# Patient Record
Sex: FEMALE | Race: WHITE | NOT HISPANIC OR LATINO | Employment: OTHER | URBAN - METROPOLITAN AREA
[De-identification: names, ages, dates, MRNs, and addresses within clinical notes are randomized per-mention and may not be internally consistent; named-entity substitution may affect disease eponyms.]

---

## 2017-04-14 ENCOUNTER — CONVERSION ENCOUNTER (OUTPATIENT)
Dept: MAMMOGRAPHY | Facility: HOSPITAL | Age: 77
End: 2017-04-14

## 2017-04-27 ENCOUNTER — CONVERSION ENCOUNTER (OUTPATIENT)
Dept: MAMMOGRAPHY | Facility: HOSPITAL | Age: 77
End: 2017-04-27

## 2018-07-18 ENCOUNTER — CONVERSION ENCOUNTER (OUTPATIENT)
Dept: MAMMOGRAPHY | Facility: HOSPITAL | Age: 78
End: 2018-07-18

## 2019-01-23 ENCOUNTER — HOSPITAL ENCOUNTER (OUTPATIENT)
Dept: LAB | Facility: HOSPITAL | Age: 79
Discharge: HOME | End: 2019-01-23
Attending: INTERNAL MEDICINE

## 2019-01-23 LAB
25(OH)D3 SERPL-MCNC: 42.4 NG/ML (ref 30–100)
ALBUMIN SERPL-MCNC: 4.3 G/DL (ref 3.5–5)
ALBUMIN/GLOB SERPL: 1.5 {RATIO} (ref 1.4–2.6)
ALP SERPL-CCNC: 58 U/L (ref 43–160)
ALT SERPL-CCNC: 17 U/L (ref 10–40)
ANION GAP SERPL CALC-SCNC: 20 MMOL/L (ref 8–19)
APPEARANCE UR: CLEAR
AST SERPL-CCNC: 22 U/L (ref 15–50)
BILIRUB SERPL-MCNC: 0.42 MG/DL (ref 0.2–1.3)
BILIRUB UR QL: NEGATIVE
BUN SERPL-MCNC: 14 MG/DL (ref 5–25)
BUN/CREAT SERPL: 15 {RATIO} (ref 6–20)
CALCIUM SERPL-MCNC: 9.8 MG/DL (ref 8.7–10.4)
CHLORIDE SERPL-SCNC: 104 MMOL/L (ref 99–111)
CHOLEST SERPL-MCNC: 164 MG/DL (ref 107–200)
CHOLEST/HDLC SERPL: 4.3 {RATIO} (ref 3–6)
COLOR UR: YELLOW
CONV CO2: 23 MMOL/L (ref 22–32)
CONV COLLECTION SOURCE (UA): NORMAL
CONV TOTAL PROTEIN: 7.1 G/DL (ref 6.3–8.2)
CONV UROBILINOGEN IN URINE BY AUTOMATED TEST STRIP: 0.2 {EHRLICHU}/DL (ref 0.1–1)
CREAT UR-MCNC: 0.91 MG/DL (ref 0.5–0.9)
EST. AVERAGE GLUCOSE BLD GHB EST-MCNC: 126 MG/DL
GFR SERPLBLD BASED ON 1.73 SQ M-ARVRAT: >60 ML/MIN/{1.73_M2}
GLOBULIN UR ELPH-MCNC: 2.8 G/DL (ref 2–3.5)
GLUCOSE SERPL-MCNC: 112 MG/DL (ref 65–99)
GLUCOSE UR QL: NEGATIVE MG/DL
HBA1C MFR BLD: 6 % (ref 3.5–5.7)
HDLC SERPL-MCNC: 38 MG/DL (ref 40–60)
HGB UR QL STRIP: NEGATIVE
KETONES UR QL STRIP: NEGATIVE MG/DL
LDLC SERPL CALC-MCNC: 94 MG/DL (ref 70–100)
LEUKOCYTE ESTERASE UR QL STRIP: NEGATIVE
NITRITE UR QL STRIP: NEGATIVE
OSMOLALITY SERPL CALC.SUM OF ELEC: 297 MOSM/KG (ref 273–304)
PH UR STRIP.AUTO: 7.5 [PH] (ref 5–8)
POTASSIUM SERPL-SCNC: 4.1 MMOL/L (ref 3.5–5.3)
PROT UR QL: NEGATIVE MG/DL
SODIUM SERPL-SCNC: 143 MMOL/L (ref 135–147)
SP GR UR: 1.01 (ref 1–1.03)
TRIGL SERPL-MCNC: 160 MG/DL (ref 40–150)
VLDLC SERPL-MCNC: 32 MG/DL (ref 5–37)

## 2019-01-25 LAB — BACTERIA UR CULT: NORMAL

## 2019-03-14 ENCOUNTER — OFFICE VISIT CONVERTED (OUTPATIENT)
Dept: ORTHOPEDIC SURGERY | Facility: CLINIC | Age: 79
End: 2019-03-14
Attending: ORTHOPAEDIC SURGERY

## 2019-04-01 ENCOUNTER — HOSPITAL ENCOUNTER (OUTPATIENT)
Dept: PREADMISSION TESTING | Facility: HOSPITAL | Age: 79
Discharge: HOME | End: 2019-04-01
Attending: ORTHOPAEDIC SURGERY

## 2019-04-01 LAB
ANION GAP SERPL CALC-SCNC: 14 MMOL/L (ref 8–19)
APTT BLD: 22.5 S (ref 22.2–34.2)
BASOPHILS # BLD AUTO: 0.04 10*3/UL (ref 0–0.2)
BASOPHILS NFR BLD AUTO: 0.6 % (ref 0–3)
BUN SERPL-MCNC: 21 MG/DL (ref 5–25)
BUN/CREAT SERPL: 30 {RATIO} (ref 6–20)
CALCIUM SERPL-MCNC: 10.1 MG/DL (ref 8.7–10.4)
CHLORIDE SERPL-SCNC: 106 MMOL/L (ref 99–111)
CONV ABS IMM GRAN: 0.01 10*3/UL (ref 0–0.2)
CONV CO2: 26 MMOL/L (ref 22–32)
CONV IMMATURE GRAN: 0.2 % (ref 0–1.8)
CREAT UR-MCNC: 0.71 MG/DL (ref 0.5–0.9)
DEPRECATED RDW RBC AUTO: 45.1 FL (ref 36.4–46.3)
EOSINOPHIL # BLD AUTO: 0.11 10*3/UL (ref 0–0.7)
EOSINOPHIL # BLD AUTO: 1.7 % (ref 0–7)
ERYTHROCYTE [DISTWIDTH] IN BLOOD BY AUTOMATED COUNT: 13 % (ref 11.7–14.4)
GFR SERPLBLD BASED ON 1.73 SQ M-ARVRAT: >60 ML/MIN/{1.73_M2}
GLUCOSE SERPL-MCNC: 119 MG/DL (ref 65–99)
HBA1C MFR BLD: 13.2 G/DL (ref 12–16)
HCT VFR BLD AUTO: 40 % (ref 37–47)
INR PPP: 0.98 (ref 2–3)
LYMPHOCYTES # BLD AUTO: 2.52 10*3/UL (ref 1–5)
MCH RBC QN AUTO: 31.3 PG (ref 27–31)
MCHC RBC AUTO-ENTMCNC: 33 G/DL (ref 33–37)
MCV RBC AUTO: 94.8 FL (ref 81–99)
MONOCYTES # BLD AUTO: 0.65 10*3/UL (ref 0.2–1.2)
MONOCYTES NFR BLD AUTO: 9.8 % (ref 3–10)
NEUTROPHILS # BLD AUTO: 3.28 10*3/UL (ref 2–8)
NEUTROPHILS NFR BLD AUTO: 49.6 % (ref 30–85)
NRBC CBCN: 0 % (ref 0–0.7)
OSMOLALITY SERPL CALC.SUM OF ELEC: 298 MOSM/KG (ref 273–304)
PLATELET # BLD AUTO: 217 10*3/UL (ref 130–400)
PMV BLD AUTO: 9.2 FL (ref 9.4–12.3)
POTASSIUM SERPL-SCNC: 4.4 MMOL/L (ref 3.5–5.3)
PROTHROMBIN TIME: 10.2 S (ref 9.4–12)
RBC # BLD AUTO: 4.22 10*6/UL (ref 4.2–5.4)
SODIUM SERPL-SCNC: 142 MMOL/L (ref 135–147)
VARIANT LYMPHS NFR BLD MANUAL: 38.1 % (ref 20–45)
WBC # BLD AUTO: 6.61 10*3/UL (ref 4.8–10.8)

## 2019-04-10 ENCOUNTER — HOSPITAL ENCOUNTER (OUTPATIENT)
Dept: PERIOP | Facility: HOSPITAL | Age: 79
Setting detail: HOSPITAL OUTPATIENT SURGERY
Discharge: HOME | End: 2019-04-11
Attending: INTERNAL MEDICINE

## 2019-04-11 LAB
ANION GAP SERPL CALC-SCNC: 14 MMOL/L (ref 8–19)
BUN SERPL-MCNC: 17 MG/DL (ref 5–25)
BUN/CREAT SERPL: 25 {RATIO} (ref 6–20)
CALCIUM SERPL-MCNC: 8.7 MG/DL (ref 8.7–10.4)
CHLORIDE SERPL-SCNC: 103 MMOL/L (ref 99–111)
CONV CO2: 27 MMOL/L (ref 22–32)
CREAT UR-MCNC: 0.68 MG/DL (ref 0.5–0.9)
GFR SERPLBLD BASED ON 1.73 SQ M-ARVRAT: >60 ML/MIN/{1.73_M2}
GLUCOSE SERPL-MCNC: 145 MG/DL (ref 65–99)
HBA1C MFR BLD: 10.7 G/DL (ref 12–16)
HCT VFR BLD AUTO: 32.4 % (ref 37–47)
OSMOLALITY SERPL CALC.SUM OF ELEC: 292 MOSM/KG (ref 273–304)
POTASSIUM SERPL-SCNC: 4.8 MMOL/L (ref 3.5–5.3)
SODIUM SERPL-SCNC: 139 MMOL/L (ref 135–147)

## 2019-04-25 ENCOUNTER — OFFICE VISIT CONVERTED (OUTPATIENT)
Dept: ORTHOPEDIC SURGERY | Facility: CLINIC | Age: 79
End: 2019-04-25
Attending: ORTHOPAEDIC SURGERY

## 2019-05-23 ENCOUNTER — OFFICE VISIT CONVERTED (OUTPATIENT)
Dept: ORTHOPEDIC SURGERY | Facility: CLINIC | Age: 79
End: 2019-05-23
Attending: ORTHOPAEDIC SURGERY

## 2019-07-17 ENCOUNTER — HOSPITAL ENCOUNTER (OUTPATIENT)
Dept: LAB | Facility: HOSPITAL | Age: 79
Discharge: HOME | End: 2019-07-17
Attending: INTERNAL MEDICINE

## 2019-07-17 LAB
25(OH)D3 SERPL-MCNC: 44 NG/ML (ref 30–100)
ALBUMIN SERPL-MCNC: 4 G/DL (ref 3.5–5)
ALBUMIN/GLOB SERPL: 1.6 {RATIO} (ref 1.4–2.6)
ALP SERPL-CCNC: 68 U/L (ref 43–160)
ALT SERPL-CCNC: 13 U/L (ref 10–40)
ANION GAP SERPL CALC-SCNC: 15 MMOL/L (ref 8–19)
AST SERPL-CCNC: 19 U/L (ref 15–50)
BASOPHILS # BLD AUTO: 0.02 10*3/UL (ref 0–0.2)
BASOPHILS NFR BLD AUTO: 0.3 % (ref 0–3)
BILIRUB SERPL-MCNC: 0.44 MG/DL (ref 0.2–1.3)
BUN SERPL-MCNC: 18 MG/DL (ref 5–25)
BUN/CREAT SERPL: 23 {RATIO} (ref 6–20)
CALCIUM SERPL-MCNC: 9.4 MG/DL (ref 8.7–10.4)
CHLORIDE SERPL-SCNC: 104 MMOL/L (ref 99–111)
CONV ABS IMM GRAN: 0.02 10*3/UL (ref 0–0.2)
CONV CO2: 28 MMOL/L (ref 22–32)
CONV IMMATURE GRAN: 0.3 % (ref 0–1.8)
CONV TOTAL PROTEIN: 6.5 G/DL (ref 6.3–8.2)
CREAT UR-MCNC: 0.77 MG/DL (ref 0.5–0.9)
DEPRECATED RDW RBC AUTO: 44.1 FL (ref 36.4–46.3)
EOSINOPHIL # BLD AUTO: 0.22 10*3/UL (ref 0–0.7)
EOSINOPHIL # BLD AUTO: 3.5 % (ref 0–7)
ERYTHROCYTE [DISTWIDTH] IN BLOOD BY AUTOMATED COUNT: 12.9 % (ref 11.7–14.4)
EST. AVERAGE GLUCOSE BLD GHB EST-MCNC: 128 MG/DL
GFR SERPLBLD BASED ON 1.73 SQ M-ARVRAT: >60 ML/MIN/{1.73_M2}
GLOBULIN UR ELPH-MCNC: 2.5 G/DL (ref 2–3.5)
GLUCOSE SERPL-MCNC: 102 MG/DL (ref 65–99)
HBA1C MFR BLD: 12.4 G/DL (ref 12–16)
HBA1C MFR BLD: 6.1 % (ref 3.5–5.7)
HCT VFR BLD AUTO: 37.9 % (ref 37–47)
LYMPHOCYTES # BLD AUTO: 2.08 10*3/UL (ref 1–5)
MCH RBC QN AUTO: 30.4 PG (ref 27–31)
MCHC RBC AUTO-ENTMCNC: 32.7 G/DL (ref 33–37)
MCV RBC AUTO: 92.9 FL (ref 81–99)
MONOCYTES # BLD AUTO: 0.58 10*3/UL (ref 0.2–1.2)
MONOCYTES NFR BLD AUTO: 9.2 % (ref 3–10)
NEUTROPHILS # BLD AUTO: 3.41 10*3/UL (ref 2–8)
NEUTROPHILS NFR BLD AUTO: 53.8 % (ref 30–85)
NRBC CBCN: 0 % (ref 0–0.7)
OSMOLALITY SERPL CALC.SUM OF ELEC: 298 MOSM/KG (ref 273–304)
PLATELET # BLD AUTO: 219 10*3/UL (ref 130–400)
PMV BLD AUTO: 10.1 FL (ref 9.4–12.3)
POTASSIUM SERPL-SCNC: 4.3 MMOL/L (ref 3.5–5.3)
RBC # BLD AUTO: 4.08 10*6/UL (ref 4.2–5.4)
SODIUM SERPL-SCNC: 143 MMOL/L (ref 135–147)
VARIANT LYMPHS NFR BLD MANUAL: 32.9 % (ref 20–45)
WBC # BLD AUTO: 6.33 10*3/UL (ref 4.8–10.8)

## 2019-09-24 ENCOUNTER — HOSPITAL ENCOUNTER (OUTPATIENT)
Dept: MAMMOGRAPHY | Facility: HOSPITAL | Age: 79
Discharge: HOME | End: 2019-09-24
Attending: INTERNAL MEDICINE

## 2019-10-15 ENCOUNTER — HOSPITAL ENCOUNTER (OUTPATIENT)
Dept: INFUSION THERAPY | Facility: HOSPITAL | Age: 79
Discharge: HOME | End: 2019-10-15
Attending: INTERNAL MEDICINE

## 2019-10-15 LAB
ANION GAP SERPL CALC-SCNC: 15 MMOL/L (ref 8–19)
BUN SERPL-MCNC: 16 MG/DL (ref 5–25)
BUN/CREAT SERPL: 21 {RATIO} (ref 6–20)
CALCIUM SERPL-MCNC: 9.6 MG/DL (ref 8.7–10.4)
CHLORIDE SERPL-SCNC: 102 MMOL/L (ref 99–111)
CONV CO2: 27 MMOL/L (ref 22–32)
CREAT UR-MCNC: 0.75 MG/DL (ref 0.5–0.9)
GFR SERPLBLD BASED ON 1.73 SQ M-ARVRAT: >60 ML/MIN/{1.73_M2}
GLUCOSE SERPL-MCNC: 99 MG/DL (ref 65–99)
OSMOLALITY SERPL CALC.SUM OF ELEC: 289 MOSM/KG (ref 273–304)
POTASSIUM SERPL-SCNC: 4.6 MMOL/L (ref 3.5–5.3)
SODIUM SERPL-SCNC: 139 MMOL/L (ref 135–147)

## 2019-11-21 ENCOUNTER — OFFICE VISIT CONVERTED (OUTPATIENT)
Dept: ORTHOPEDIC SURGERY | Facility: CLINIC | Age: 79
End: 2019-11-21
Attending: ORTHOPAEDIC SURGERY

## 2019-12-17 ENCOUNTER — HOSPITAL ENCOUNTER (OUTPATIENT)
Dept: PREADMISSION TESTING | Facility: HOSPITAL | Age: 79
Discharge: HOME | End: 2019-12-17
Attending: ORTHOPAEDIC SURGERY

## 2019-12-17 LAB
ALBUMIN SERPL-MCNC: 4.1 G/DL (ref 3.5–5)
ALBUMIN/GLOB SERPL: 1.5 {RATIO} (ref 1.4–2.6)
ALP SERPL-CCNC: 70 U/L (ref 43–160)
ALT SERPL-CCNC: 16 U/L (ref 10–40)
ANION GAP SERPL CALC-SCNC: 14 MMOL/L (ref 8–19)
APTT BLD: 21.6 S (ref 22.2–34.2)
AST SERPL-CCNC: 19 U/L (ref 15–50)
BASOPHILS # BLD AUTO: 0.04 10*3/UL (ref 0–0.2)
BASOPHILS NFR BLD AUTO: 0.5 % (ref 0–3)
BILIRUB SERPL-MCNC: 0.49 MG/DL (ref 0.2–1.3)
BUN SERPL-MCNC: 11 MG/DL (ref 5–25)
BUN/CREAT SERPL: 14 {RATIO} (ref 6–20)
CALCIUM SERPL-MCNC: 9.6 MG/DL (ref 8.7–10.4)
CHLORIDE SERPL-SCNC: 101 MMOL/L (ref 99–111)
CONV ABS IMM GRAN: 0.02 10*3/UL (ref 0–0.2)
CONV CO2: 27 MMOL/L (ref 22–32)
CONV IMMATURE GRAN: 0.2 % (ref 0–1.8)
CONV TOTAL PROTEIN: 6.8 G/DL (ref 6.3–8.2)
CREAT UR-MCNC: 0.76 MG/DL (ref 0.5–0.9)
DEPRECATED RDW RBC AUTO: 45.1 FL (ref 36.4–46.3)
EOSINOPHIL # BLD AUTO: 0.15 10*3/UL (ref 0–0.7)
EOSINOPHIL # BLD AUTO: 1.8 % (ref 0–7)
ERYTHROCYTE [DISTWIDTH] IN BLOOD BY AUTOMATED COUNT: 13 % (ref 11.7–14.4)
EST. AVERAGE GLUCOSE BLD GHB EST-MCNC: 131 MG/DL
GFR SERPLBLD BASED ON 1.73 SQ M-ARVRAT: >60 ML/MIN/{1.73_M2}
GLOBULIN UR ELPH-MCNC: 2.7 G/DL (ref 2–3.5)
GLUCOSE SERPL-MCNC: 105 MG/DL (ref 65–99)
HBA1C MFR BLD: 6.2 % (ref 3.5–5.7)
HCT VFR BLD AUTO: 40.9 % (ref 37–47)
HGB BLD-MCNC: 13.3 G/DL (ref 12–16)
INR PPP: 0.97 (ref 2–3)
LYMPHOCYTES # BLD AUTO: 2.67 10*3/UL (ref 1–5)
LYMPHOCYTES NFR BLD AUTO: 31.2 % (ref 20–45)
MCH RBC QN AUTO: 30.6 PG (ref 27–31)
MCHC RBC AUTO-ENTMCNC: 32.5 G/DL (ref 33–37)
MCV RBC AUTO: 94.2 FL (ref 81–99)
MONOCYTES # BLD AUTO: 0.74 10*3/UL (ref 0.2–1.2)
MONOCYTES NFR BLD AUTO: 8.6 % (ref 3–10)
NEUTROPHILS # BLD AUTO: 4.94 10*3/UL (ref 2–8)
NEUTROPHILS NFR BLD AUTO: 57.7 % (ref 30–85)
NRBC CBCN: 0 % (ref 0–0.7)
OSMOLALITY SERPL CALC.SUM OF ELEC: 286 MOSM/KG (ref 273–304)
PLATELET # BLD AUTO: 193 10*3/UL (ref 130–400)
PMV BLD AUTO: 9.3 FL (ref 9.4–12.3)
POTASSIUM SERPL-SCNC: 4.2 MMOL/L (ref 3.5–5.3)
PROTHROMBIN TIME: 10.5 S (ref 9.4–12)
RBC # BLD AUTO: 4.34 10*6/UL (ref 4.2–5.4)
SODIUM SERPL-SCNC: 138 MMOL/L (ref 135–147)
WBC # BLD AUTO: 8.56 10*3/UL (ref 4.8–10.8)

## 2020-01-06 ENCOUNTER — HOSPITAL ENCOUNTER (OUTPATIENT)
Dept: PERIOP | Facility: HOSPITAL | Age: 80
Setting detail: HOSPITAL OUTPATIENT SURGERY
Discharge: HOME | End: 2020-01-07
Attending: INTERNAL MEDICINE

## 2020-01-07 LAB
ANION GAP SERPL CALC-SCNC: 12 MMOL/L (ref 8–19)
BUN SERPL-MCNC: 10 MG/DL (ref 5–25)
BUN/CREAT SERPL: 17 {RATIO} (ref 6–20)
CALCIUM SERPL-MCNC: 8.4 MG/DL (ref 8.7–10.4)
CHLORIDE SERPL-SCNC: 106 MMOL/L (ref 99–111)
CONV CO2: 26 MMOL/L (ref 22–32)
CREAT UR-MCNC: 0.59 MG/DL (ref 0.5–0.9)
GFR SERPLBLD BASED ON 1.73 SQ M-ARVRAT: >60 ML/MIN/{1.73_M2}
GLUCOSE SERPL-MCNC: 150 MG/DL (ref 65–99)
HCT VFR BLD AUTO: 34.2 % (ref 37–47)
HGB BLD-MCNC: 11.3 G/DL (ref 12–16)
OSMOLALITY SERPL CALC.SUM OF ELEC: 290 MOSM/KG (ref 273–304)
POTASSIUM SERPL-SCNC: 4.5 MMOL/L (ref 3.5–5.3)
SODIUM SERPL-SCNC: 139 MMOL/L (ref 135–147)

## 2020-01-21 ENCOUNTER — OFFICE VISIT CONVERTED (OUTPATIENT)
Dept: ORTHOPEDIC SURGERY | Facility: CLINIC | Age: 80
End: 2020-01-21
Attending: PHYSICIAN ASSISTANT

## 2020-02-06 ENCOUNTER — HOSPITAL ENCOUNTER (OUTPATIENT)
Dept: URGENT CARE | Facility: CLINIC | Age: 80
Discharge: HOME | End: 2020-02-06
Attending: FAMILY MEDICINE

## 2020-02-10 ENCOUNTER — HOSPITAL ENCOUNTER (OUTPATIENT)
Dept: LAB | Facility: HOSPITAL | Age: 80
Discharge: HOME | End: 2020-02-10
Attending: INTERNAL MEDICINE

## 2020-02-10 ENCOUNTER — HOSPITAL ENCOUNTER (OUTPATIENT)
Dept: CARDIOLOGY | Facility: HOSPITAL | Age: 80
Discharge: HOME | End: 2020-02-10
Attending: PHYSICIAN ASSISTANT

## 2020-02-10 LAB
ANION GAP SERPL CALC-SCNC: 17 MMOL/L (ref 8–19)
BASOPHILS # BLD AUTO: 0.04 10*3/UL (ref 0–0.2)
BASOPHILS NFR BLD AUTO: 0.4 % (ref 0–3)
BUN SERPL-MCNC: 15 MG/DL (ref 5–25)
BUN/CREAT SERPL: 19 {RATIO} (ref 6–20)
CALCIUM SERPL-MCNC: 9.5 MG/DL (ref 8.7–10.4)
CHLORIDE SERPL-SCNC: 101 MMOL/L (ref 99–111)
CONV ABS IMM GRAN: 0.04 10*3/UL (ref 0–0.2)
CONV CO2: 25 MMOL/L (ref 22–32)
CONV IMMATURE GRAN: 0.4 % (ref 0–1.8)
CREAT UR-MCNC: 0.79 MG/DL (ref 0.5–0.9)
DEPRECATED RDW RBC AUTO: 44.1 FL (ref 36.4–46.3)
EOSINOPHIL # BLD AUTO: 0.11 10*3/UL (ref 0–0.7)
EOSINOPHIL # BLD AUTO: 1.2 % (ref 0–7)
ERYTHROCYTE [DISTWIDTH] IN BLOOD BY AUTOMATED COUNT: 12.7 % (ref 11.7–14.4)
GFR SERPLBLD BASED ON 1.73 SQ M-ARVRAT: >60 ML/MIN/{1.73_M2}
GLUCOSE SERPL-MCNC: 112 MG/DL (ref 65–99)
HCT VFR BLD AUTO: 39.5 % (ref 37–47)
HGB BLD-MCNC: 12.6 G/DL (ref 12–16)
LYMPHOCYTES # BLD AUTO: 3.01 10*3/UL (ref 1–5)
LYMPHOCYTES NFR BLD AUTO: 32.5 % (ref 20–45)
MCH RBC QN AUTO: 30.1 PG (ref 27–31)
MCHC RBC AUTO-ENTMCNC: 31.9 G/DL (ref 33–37)
MCV RBC AUTO: 94.3 FL (ref 81–99)
MONOCYTES # BLD AUTO: 0.84 10*3/UL (ref 0.2–1.2)
MONOCYTES NFR BLD AUTO: 9.1 % (ref 3–10)
NEUTROPHILS # BLD AUTO: 5.21 10*3/UL (ref 2–8)
NEUTROPHILS NFR BLD AUTO: 56.4 % (ref 30–85)
NRBC CBCN: 0 % (ref 0–0.7)
OSMOLALITY SERPL CALC.SUM OF ELEC: 290 MOSM/KG (ref 273–304)
PLATELET # BLD AUTO: 258 10*3/UL (ref 130–400)
PMV BLD AUTO: 10.2 FL (ref 9.4–12.3)
POTASSIUM SERPL-SCNC: 4.1 MMOL/L (ref 3.5–5.3)
RBC # BLD AUTO: 4.19 10*6/UL (ref 4.2–5.4)
SODIUM SERPL-SCNC: 139 MMOL/L (ref 135–147)
WBC # BLD AUTO: 9.25 10*3/UL (ref 4.8–10.8)

## 2020-02-18 ENCOUNTER — OFFICE VISIT CONVERTED (OUTPATIENT)
Dept: ORTHOPEDIC SURGERY | Facility: CLINIC | Age: 80
End: 2020-02-18
Attending: PHYSICIAN ASSISTANT

## 2020-02-26 ENCOUNTER — HOSPITAL ENCOUNTER (OUTPATIENT)
Dept: URGENT CARE | Facility: CLINIC | Age: 80
Discharge: HOME | End: 2020-02-26
Attending: PHYSICIAN ASSISTANT

## 2020-02-28 ENCOUNTER — HOSPITAL ENCOUNTER (OUTPATIENT)
Dept: LAB | Facility: HOSPITAL | Age: 80
Discharge: HOME | End: 2020-02-28
Attending: INTERNAL MEDICINE

## 2020-02-28 LAB
ALBUMIN SERPL-MCNC: 3.9 G/DL (ref 3.5–5)
ALBUMIN/GLOB SERPL: 1.1 {RATIO} (ref 1.4–2.6)
ALP SERPL-CCNC: 83 U/L (ref 43–160)
ALT SERPL-CCNC: 10 U/L (ref 10–40)
ANION GAP SERPL CALC-SCNC: 20 MMOL/L (ref 8–19)
AST SERPL-CCNC: 17 U/L (ref 15–50)
BASOPHILS # BLD AUTO: 0.03 10*3/UL (ref 0–0.2)
BASOPHILS NFR BLD AUTO: 0.4 % (ref 0–3)
BILIRUB SERPL-MCNC: 0.56 MG/DL (ref 0.2–1.3)
BUN SERPL-MCNC: 14 MG/DL (ref 5–25)
BUN/CREAT SERPL: 17 {RATIO} (ref 6–20)
CALCIUM SERPL-MCNC: 9.8 MG/DL (ref 8.7–10.4)
CHLORIDE SERPL-SCNC: 101 MMOL/L (ref 99–111)
CHOLEST SERPL-MCNC: 139 MG/DL (ref 107–200)
CHOLEST/HDLC SERPL: 3.8 {RATIO} (ref 3–6)
CONV ABS IMM GRAN: 0.02 10*3/UL (ref 0–0.2)
CONV CO2: 24 MMOL/L (ref 22–32)
CONV IMMATURE GRAN: 0.3 % (ref 0–1.8)
CONV TOTAL PROTEIN: 7.3 G/DL (ref 6.3–8.2)
CREAT UR-MCNC: 0.82 MG/DL (ref 0.5–0.9)
DEPRECATED RDW RBC AUTO: 45.1 FL (ref 36.4–46.3)
EOSINOPHIL # BLD AUTO: 0.14 10*3/UL (ref 0–0.7)
EOSINOPHIL # BLD AUTO: 2 % (ref 0–7)
ERYTHROCYTE [DISTWIDTH] IN BLOOD BY AUTOMATED COUNT: 13.2 % (ref 11.7–14.4)
EST. AVERAGE GLUCOSE BLD GHB EST-MCNC: 128 MG/DL
GFR SERPLBLD BASED ON 1.73 SQ M-ARVRAT: >60 ML/MIN/{1.73_M2}
GLOBULIN UR ELPH-MCNC: 3.4 G/DL (ref 2–3.5)
GLUCOSE SERPL-MCNC: 119 MG/DL (ref 65–99)
HBA1C MFR BLD: 6.1 % (ref 3.5–5.7)
HCT VFR BLD AUTO: 38.5 % (ref 37–47)
HDLC SERPL-MCNC: 37 MG/DL (ref 40–60)
HGB BLD-MCNC: 12.3 G/DL (ref 12–16)
LDLC SERPL CALC-MCNC: 76 MG/DL (ref 70–100)
LYMPHOCYTES # BLD AUTO: 2.22 10*3/UL (ref 1–5)
LYMPHOCYTES NFR BLD AUTO: 31.2 % (ref 20–45)
MCH RBC QN AUTO: 30 PG (ref 27–31)
MCHC RBC AUTO-ENTMCNC: 31.9 G/DL (ref 33–37)
MCV RBC AUTO: 93.9 FL (ref 81–99)
MONOCYTES # BLD AUTO: 0.63 10*3/UL (ref 0.2–1.2)
MONOCYTES NFR BLD AUTO: 8.9 % (ref 3–10)
NEUTROPHILS # BLD AUTO: 4.07 10*3/UL (ref 2–8)
NEUTROPHILS NFR BLD AUTO: 57.2 % (ref 30–85)
NRBC CBCN: 0 % (ref 0–0.7)
OSMOLALITY SERPL CALC.SUM OF ELEC: 294 MOSM/KG (ref 273–304)
PLATELET # BLD AUTO: 305 10*3/UL (ref 130–400)
PMV BLD AUTO: 9.9 FL (ref 9.4–12.3)
POTASSIUM SERPL-SCNC: 3.9 MMOL/L (ref 3.5–5.3)
RBC # BLD AUTO: 4.1 10*6/UL (ref 4.2–5.4)
SODIUM SERPL-SCNC: 141 MMOL/L (ref 135–147)
T4 FREE SERPL-MCNC: 1.2 NG/DL (ref 0.9–1.8)
TRIGL SERPL-MCNC: 131 MG/DL (ref 40–150)
TSH SERPL-ACNC: 3.33 M[IU]/L (ref 0.27–4.2)
VLDLC SERPL-MCNC: 26 MG/DL (ref 5–37)
WBC # BLD AUTO: 7.11 10*3/UL (ref 4.8–10.8)

## 2020-04-07 ENCOUNTER — TELEPHONE CONVERTED (OUTPATIENT)
Dept: ORTHOPEDIC SURGERY | Facility: CLINIC | Age: 80
End: 2020-04-07
Attending: PHYSICIAN ASSISTANT

## 2020-04-07 ENCOUNTER — CONVERSION ENCOUNTER (OUTPATIENT)
Dept: ORTHOPEDIC SURGERY | Facility: CLINIC | Age: 80
End: 2020-04-07

## 2020-07-30 ENCOUNTER — OFFICE VISIT CONVERTED (OUTPATIENT)
Dept: ORTHOPEDIC SURGERY | Facility: CLINIC | Age: 80
End: 2020-07-30
Attending: PHYSICIAN ASSISTANT

## 2020-09-03 ENCOUNTER — HOSPITAL ENCOUNTER (OUTPATIENT)
Dept: LAB | Facility: HOSPITAL | Age: 80
Discharge: HOME | End: 2020-09-03
Attending: INTERNAL MEDICINE

## 2020-09-03 LAB
EST. AVERAGE GLUCOSE BLD GHB EST-MCNC: 131 MG/DL
HBA1C MFR BLD: 6.2 % (ref 3.5–5.7)

## 2020-09-04 LAB
25(OH)D3 SERPL-MCNC: 51.2 NG/ML (ref 30–100)
ALBUMIN SERPL-MCNC: 4.3 G/DL (ref 3.5–5)
ALBUMIN/GLOB SERPL: 1.6 {RATIO} (ref 1.4–2.6)
ALP SERPL-CCNC: 69 U/L (ref 43–160)
ALT SERPL-CCNC: 15 U/L (ref 10–40)
ANION GAP SERPL CALC-SCNC: 19 MMOL/L (ref 8–19)
AST SERPL-CCNC: 23 U/L (ref 15–50)
BILIRUB SERPL-MCNC: 0.43 MG/DL (ref 0.2–1.3)
BUN SERPL-MCNC: 14 MG/DL (ref 5–25)
BUN/CREAT SERPL: 17 {RATIO} (ref 6–20)
CALCIUM SERPL-MCNC: 9.8 MG/DL (ref 8.7–10.4)
CHLORIDE SERPL-SCNC: 105 MMOL/L (ref 99–111)
CHOLEST SERPL-MCNC: 142 MG/DL (ref 107–200)
CHOLEST/HDLC SERPL: 3.8 {RATIO} (ref 3–6)
CONV CO2: 24 MMOL/L (ref 22–32)
CONV TOTAL PROTEIN: 7 G/DL (ref 6.3–8.2)
CREAT UR-MCNC: 0.81 MG/DL (ref 0.5–0.9)
GFR SERPLBLD BASED ON 1.73 SQ M-ARVRAT: >60 ML/MIN/{1.73_M2}
GLOBULIN UR ELPH-MCNC: 2.7 G/DL (ref 2–3.5)
GLUCOSE SERPL-MCNC: 94 MG/DL (ref 65–99)
HDLC SERPL-MCNC: 37 MG/DL (ref 40–60)
LDLC SERPL CALC-MCNC: 83 MG/DL (ref 70–100)
OSMOLALITY SERPL CALC.SUM OF ELEC: 296 MOSM/KG (ref 273–304)
POTASSIUM SERPL-SCNC: 4.5 MMOL/L (ref 3.5–5.3)
SODIUM SERPL-SCNC: 143 MMOL/L (ref 135–147)
TRIGL SERPL-MCNC: 111 MG/DL (ref 40–150)
VLDLC SERPL-MCNC: 22 MG/DL (ref 5–37)

## 2020-12-04 ENCOUNTER — HOSPITAL ENCOUNTER (OUTPATIENT)
Dept: MAMMOGRAPHY | Facility: HOSPITAL | Age: 80
Discharge: HOME | End: 2020-12-04
Attending: INTERNAL MEDICINE

## 2021-01-28 ENCOUNTER — HOSPITAL ENCOUNTER (OUTPATIENT)
Dept: OTHER | Facility: HOSPITAL | Age: 81
Discharge: HOME | End: 2021-01-28
Attending: INTERNAL MEDICINE

## 2021-02-23 ENCOUNTER — HOSPITAL ENCOUNTER (OUTPATIENT)
Dept: VACCINE CLINIC | Facility: HOSPITAL | Age: 81
Discharge: HOME | End: 2021-02-23
Attending: INTERNAL MEDICINE

## 2021-03-04 ENCOUNTER — HOSPITAL ENCOUNTER (OUTPATIENT)
Dept: LAB | Facility: HOSPITAL | Age: 81
Discharge: HOME | End: 2021-03-04
Attending: INTERNAL MEDICINE

## 2021-03-04 LAB
ALBUMIN SERPL-MCNC: 4 G/DL (ref 3.5–5)
ALBUMIN/GLOB SERPL: 1.4 {RATIO} (ref 1.4–2.6)
ALP SERPL-CCNC: 65 U/L (ref 43–160)
ALT SERPL-CCNC: 16 U/L (ref 10–40)
ANION GAP SERPL CALC-SCNC: 15 MMOL/L (ref 8–19)
AST SERPL-CCNC: 24 U/L (ref 15–50)
BASOPHILS # BLD AUTO: 0.05 10*3/UL (ref 0–0.2)
BASOPHILS NFR BLD AUTO: 0.5 % (ref 0–3)
BILIRUB SERPL-MCNC: 0.52 MG/DL (ref 0.2–1.3)
BUN SERPL-MCNC: 15 MG/DL (ref 5–25)
BUN/CREAT SERPL: 19 {RATIO} (ref 6–20)
CALCIUM SERPL-MCNC: 10 MG/DL (ref 8.7–10.4)
CHLORIDE SERPL-SCNC: 104 MMOL/L (ref 99–111)
CHOLEST SERPL-MCNC: 160 MG/DL (ref 107–200)
CHOLEST/HDLC SERPL: 4.3 {RATIO} (ref 3–6)
CONV ABS IMM GRAN: 0.08 10*3/UL (ref 0–0.2)
CONV CO2: 26 MMOL/L (ref 22–32)
CONV IMMATURE GRAN: 0.9 % (ref 0–1.8)
CONV TOTAL PROTEIN: 6.9 G/DL (ref 6.3–8.2)
CREAT UR-MCNC: 0.81 MG/DL (ref 0.5–0.9)
DEPRECATED RDW RBC AUTO: 42.8 FL (ref 36.4–46.3)
EOSINOPHIL # BLD AUTO: 0.18 10*3/UL (ref 0–0.7)
EOSINOPHIL # BLD AUTO: 2 % (ref 0–7)
ERYTHROCYTE [DISTWIDTH] IN BLOOD BY AUTOMATED COUNT: 12.6 % (ref 11.7–14.4)
EST. AVERAGE GLUCOSE BLD GHB EST-MCNC: 131 MG/DL
GFR SERPLBLD BASED ON 1.73 SQ M-ARVRAT: >60 ML/MIN/{1.73_M2}
GLOBULIN UR ELPH-MCNC: 2.9 G/DL (ref 2–3.5)
GLUCOSE SERPL-MCNC: 105 MG/DL (ref 65–99)
HBA1C MFR BLD: 6.2 % (ref 3.5–5.7)
HCT VFR BLD AUTO: 38.7 % (ref 37–47)
HDLC SERPL-MCNC: 37 MG/DL (ref 40–60)
HGB BLD-MCNC: 12.9 G/DL (ref 12–16)
LDLC SERPL CALC-MCNC: 95 MG/DL (ref 70–100)
LYMPHOCYTES # BLD AUTO: 2.39 10*3/UL (ref 1–5)
LYMPHOCYTES NFR BLD AUTO: 26.3 % (ref 20–45)
MCH RBC QN AUTO: 30.9 PG (ref 27–31)
MCHC RBC AUTO-ENTMCNC: 33.3 G/DL (ref 33–37)
MCV RBC AUTO: 92.8 FL (ref 81–99)
MONOCYTES # BLD AUTO: 0.65 10*3/UL (ref 0.2–1.2)
MONOCYTES NFR BLD AUTO: 7.1 % (ref 3–10)
NEUTROPHILS # BLD AUTO: 5.75 10*3/UL (ref 2–8)
NEUTROPHILS NFR BLD AUTO: 63.2 % (ref 30–85)
NRBC CBCN: 0 % (ref 0–0.7)
OSMOLALITY SERPL CALC.SUM OF ELEC: 293 MOSM/KG (ref 273–304)
PLATELET # BLD AUTO: 215 10*3/UL (ref 130–400)
PMV BLD AUTO: 9.6 FL (ref 9.4–12.3)
POTASSIUM SERPL-SCNC: 4.3 MMOL/L (ref 3.5–5.3)
RBC # BLD AUTO: 4.17 10*6/UL (ref 4.2–5.4)
SODIUM SERPL-SCNC: 141 MMOL/L (ref 135–147)
TRIGL SERPL-MCNC: 142 MG/DL (ref 40–150)
VLDLC SERPL-MCNC: 28 MG/DL (ref 5–37)
WBC # BLD AUTO: 9.1 10*3/UL (ref 4.8–10.8)

## 2021-03-09 ENCOUNTER — HOSPITAL ENCOUNTER (OUTPATIENT)
Dept: LAB | Facility: HOSPITAL | Age: 81
Discharge: HOME | End: 2021-03-09
Attending: INTERNAL MEDICINE

## 2021-03-09 LAB
APPEARANCE UR: CLEAR
BILIRUB UR QL: NEGATIVE
COLOR UR: YELLOW
CONV BACTERIA: ABNORMAL
CONV COLLECTION SOURCE (UA): ABNORMAL
CONV UROBILINOGEN IN URINE BY AUTOMATED TEST STRIP: 0.2 {EHRLICHU}/DL (ref 0.1–1)
GLUCOSE UR QL: NEGATIVE MG/DL
HGB UR QL STRIP: NEGATIVE
KETONES UR QL STRIP: NEGATIVE MG/DL
LEUKOCYTE ESTERASE UR QL STRIP: ABNORMAL
NITRITE UR QL STRIP: POSITIVE
PH UR STRIP.AUTO: 7.5 [PH] (ref 5–8)
PROT UR QL: NEGATIVE MG/DL
RBC #/AREA URNS HPF: ABNORMAL /[HPF]
SP GR UR: 1.01 (ref 1–1.03)
WBC #/AREA URNS HPF: ABNORMAL /[HPF]

## 2021-03-11 LAB
AMPICILLIN SUSC ISLT: >=32
AMPICILLIN+SULBAC SUSC ISLT: >=32
BACTERIA UR CULT: ABNORMAL
CEFAZOLIN SUSC ISLT: <=4
CEFEPIME SUSC ISLT: <=0.12
CEFTAZIDIME SUSC ISLT: <=1
CEFTRIAXONE SUSC ISLT: <=0.25
CIPROFLOXACIN SUSC ISLT: <=0.25
ERTAPENEM SUSC ISLT: <=0.12
GENTAMICIN SUSC ISLT: <=1
LEVOFLOXACIN SUSC ISLT: <=0.12
NITROFURANTOIN SUSC ISLT: <=16
PIP+TAZO SUSC ISLT: <=4
TMP SMX SUSC ISLT: >=320
TOBRAMYCIN SUSC ISLT: <=1

## 2021-05-12 NOTE — PROGRESS NOTES
"   Quick Note      Patient Name: Janet Cardenas   Patient ID: [id number]   Sex: Female   YOB: 1940    Primary Care Provider: Ari Brady MD   Referring Provider: Ari Brady MD    Visit Date: April 7, 2020    Provider: Madhuri Zhou PA-C   Location: [facility]   Location Address: [address] [place] [address]  [id number]   Location Phone: [phone]          History Of Present Illness  TELEHEALTH VISIT  Chief Complaint: Left knee pain   Janet Cardenas is a 79 year old /White female who is presenting for evaluation via telehealth visit. Verbal consent obtained before beginning visit.   Provider spent 10 minutes with the patient during telehealth visit.   The following staff were present during this visit: INPUT BOX   Past Medical History/Overview of Patient Symptoms     Patient is status post left total knee arthroplasty performed 1/6/20. Patient states doing well with mild pain in left knee. Patient states pain in the left buttock radiating down the posterior left leg. Patient is taking Xarelto for DVT.    Assessment: left total knee arthroplasty, left sciatic nerve pain.    Plan: discussed stretching exercise for left sciatic nerve pain, emailed to Patient. Follow up 8 weeks, x ray left knee, left hip            Vitals  Date Time BP Position Site L\R Cuff Size HR RR TEMP (F) WT  HT  BMI kg/m2 BSA m2 O2 Sat HC       04/07/2020 09:25 AM         166lbs 16oz 5'  2\" 30.54 1.82               Plan  · Medications  o Medications have been Reconciled  o Transition of Care or Provider Policy  · Instructions  o Plan Of Care:             Electronically Signed by: Madhuri Zhou PA-C -Author on April 7, 2020 09:33:26 AM  "

## 2021-05-13 NOTE — PROGRESS NOTES
Progress Note      Patient Name: Janet Cardenas   Patient ID: [id number]   Sex: Female   YOB: 1940    Primary Care Provider: Ari Brady MD   Referring Provider: Ari Brady MD    Visit Date: July 30, 2020    Provider: Madhuri Zhou PA-C   Location: [facility]   Location Address: [address] [place] [address]  [id number]   Location Phone: [phone]          Chief Complaint  · Left Total Knee Arthroplasty  · Left Hip Pain      History Of Present Illness  Janet Cardenas is a 80 year old /White female who presents today to [facility].      Patient is status post left total knee arthroplasty performed 1/6/20. Patient states doing well with mild pain in left knee. Patient states pain in the left buttock radiating down the posterior left leg has decreased. Patient is taking Xarelto for DVT.              Past Medical History  Arthritis; Arthritis: Knee; GERD; High cholesterol; Hyperlipemia; Knee osteoarthritis; Primary osteoarthritis of both knees; Rectal bleeding; Reflux; Seasonal allergies         Past Surgical History  Artificial Joints/Limbs; Back; Back surgery; Breast biopsy; Colonoscopy; Joint Surgery; Tonsillectomy         Medication List  Calcium 600 600 mg (1,500 mg) oral tablet; Daily Vitamin oral tablet; escitalopram oxalate 10 mg oral tablet; Norco 7.5-325 mg oral tablet; pravastatin 10 mg oral tablet; Prilosec OTC 20 mg oral tablet,delayed release (DR/EC)         Allergy List  Eliquis         Family Medical History  Heart Disease; Cancer, Unspecified; Diabetes, unspecified type         Social History  Alcohol (Never); Alcohol Use (Never); Caffeine (Current every day); Customer Service; Homemaker.; lives with spouse; ; .; Recreational Drug Use (Never); Retired.; Smokeless tobacco (Never); Tobacco (Former)         Review of Systems  · Constitutional  o Denies  o : fever, chills, weight loss  · Cardiovascular  o Denies  o : chest pain, shortness of  "breath  · Gastrointestinal  o Denies  o : liver disease, heartburn, nausea, blood in stools  · Genitourinary  o Denies  o : painful urination, blood in urine  · Integument  o Denies  o : rash, itching  · Neurologic  o Denies  o : headache, weakness, loss of consciousness  · Musculoskeletal  o Denies  o : painful, swollen joints  · Psychiatric  o Denies  o : drug/alcohol addiction, anxiety, depression      Vitals  Date Time BP Position Site L\R Cuff Size HR RR TEMP (F) WT  HT  BMI kg/m2 BSA m2 O2 Sat        07/30/2020 09:46 AM         166lbs 16oz 5'  2\" 30.54 1.82           Physical Examination  · Constitutional  o Appearance  o : well developed, well-nourished, no obvious deformities present  · Head and Face  o Head  o :   § Inspection  § : normocephalic  o Face  o :   § Inspection  § : no facial lesions  · Eyes  o Conjunctivae  o : conjunctivae normal  o Sclerae  o : sclerae white  · Ears, Nose, Mouth and Throat  o Ears  o :   § External Ears  § : appearance within normal limits  § Hearing  § : intact  o Nose  o :   § External Nose  § : appearance normal  · Neck  o Inspection/Palpation  o : normal appearance  o Range of Motion  o : full range of motion  · Respiratory  o Respiratory Effort  o : breathing unlabored  o Inspection of Chest  o : normal appearance  o Auscultation of Lungs  o : no audible wheezing or rales  · Cardiovascular  o Heart  o : regular rate  · Gastrointestinal  o Abdominal Examination  o : soft and non-tender  · Skin and Subcutaneous Tissue  o General Inspection  o : intact, no rashes  · Psychiatric  o General  o : Alert and oriented x3  o Judgement and Insight  o : judgment and insight intact  o Mood and Affect  o : mood normal, affect appropriate  · In Office Procedures  o View  o : AP/LATERAL/SUNRISE   o Site  o : left, knee, left, hip  o Indication  o : Left Total Knee Arthroplasty, Left Hip Pain  o Study  o : X-rays ordered, taken in the office, and reviewed today.  o Xray  o : Left Knee: " stable implant. Left Hip: mild to moderate osteoarthritis  o Comparative Data  o : Comparative Data found and reviewed today   · Left Knee-Street  o Inspection  o : scar well healed, no limping gait, weight bearing, no swelling, no ecchymosis, no atrophy, neutral alignment  o Palpation  o : no medial joint line tenderness, no lateral joint line tenderness, no patellar tendon tenderness, no pain of MCL, no pain at LCL  o ROM  o : full extension, full flexion  o Strength  o : full extension, full flexion  o Special Tests  o : negative varus stress, negaitve valgus stress  o Neurovascular  o : Full sensation, Dorsal Pedal Pulse 2+, posteriror tibialis pulse 2+  · Left Hip-Street Exam  o Inspection  o : No scar  o Palpation  o : no tenderness at hip and pelvic muscles  o ROM  o : normal extension (30), normal abduction (45-50), normal adduction, normal internal rotation, normal external rotation  o Special Tests  o : normal heel/toe walk, no pain with flexion, no pain with extension, no pain with rotation  o Neurologic Testing  o : Neurovascular and sensation intact          Assessment  · Primary osteoarthritis of left knee     715.16/M17.12  · Aftercare;following joint replacement     V54.81/Z47.1  · Left Pain: Hip     719.45/M25.559  · History of total knee arthroplasty, left     V43.65/Z96.652      Plan  · Orders  o Knee (Left) 3 views X-Ray [facility] ([id number]) - 715.16/M17.12, V43.65/Z96.652 - 07/30/2020  o Hip (Left) 2 or more views (includes AP Pelvis) [facility] ([id number]) - 719.45/M25.559 - 07/30/2020  · Medications  o Medications have been Reconciled  o Transition of Care or Provider Policy  · Instructions  o Reviewed the patient's Past Medical, Social, and Family history as well as the ROS at today's visit, no changes.  o Call or return if worsening symptoms.  o X-ray ordered, taken and reviewed at this visit.  o Electronically Identified Patient Education Materials Provided Electronically      Follow up 1 year, x ray             Electronically Signed by: Madhuri Zhou PA-C -Author on July 30, 2020 10:10:49 AM

## 2021-05-15 VITALS — OXYGEN SATURATION: 97 % | HEIGHT: 62 IN | BODY MASS INDEX: 28.52 KG/M2 | HEART RATE: 83 BPM | WEIGHT: 155 LBS

## 2021-05-15 VITALS — WEIGHT: 164 LBS | BODY MASS INDEX: 30.96 KG/M2 | HEIGHT: 61 IN | HEART RATE: 85 BPM | OXYGEN SATURATION: 98 %

## 2021-05-15 VITALS — WEIGHT: 164 LBS | BODY MASS INDEX: 30.18 KG/M2 | OXYGEN SATURATION: 98 % | HEART RATE: 78 BPM | HEIGHT: 62 IN

## 2021-05-15 VITALS — WEIGHT: 167 LBS | BODY MASS INDEX: 30.73 KG/M2 | HEIGHT: 62 IN

## 2021-05-15 VITALS — WEIGHT: 163 LBS | BODY MASS INDEX: 30 KG/M2 | HEART RATE: 71 BPM | HEIGHT: 62 IN | OXYGEN SATURATION: 99 %

## 2021-05-15 VITALS — BODY MASS INDEX: 30.73 KG/M2 | HEIGHT: 62 IN | WEIGHT: 167 LBS

## 2021-05-15 VITALS — OXYGEN SATURATION: 95 % | HEIGHT: 62 IN | HEART RATE: 80 BPM | BODY MASS INDEX: 29.26 KG/M2 | WEIGHT: 159 LBS

## 2021-05-16 VITALS — HEIGHT: 62 IN | OXYGEN SATURATION: 98 % | WEIGHT: 165 LBS | BODY MASS INDEX: 30.36 KG/M2 | HEART RATE: 76 BPM

## 2021-05-23 ENCOUNTER — TRANSCRIBE ORDERS (OUTPATIENT)
Dept: ADMINISTRATIVE | Facility: HOSPITAL | Age: 81
End: 2021-05-23

## 2021-05-23 DIAGNOSIS — Z78.0 POST-MENOPAUSAL: Primary | ICD-10-CM

## 2021-08-17 ENCOUNTER — TELEPHONE (OUTPATIENT)
Dept: INTERNAL MEDICINE | Facility: CLINIC | Age: 81
End: 2021-08-17

## 2021-08-17 DIAGNOSIS — R19.5 POSITIVE COLORECTAL CANCER SCREENING USING COLOGUARD TEST: Primary | ICD-10-CM

## 2021-08-17 NOTE — TELEPHONE ENCOUNTER
----- Message from Ari Brady MD sent at 8/17/2021  1:20 AM EDT -----  Regarding: FW:  Let pt know it was positive, so she needs to see GI or surgeon to discuss colonoscopy.  Send me order to sign in regards to what she decides.  Thanks.  ----- Message -----  From: Sissy, Anabella Incoming  Sent: 8/16/2021   5:21 PM EDT  To: Ari Brady MD

## 2021-08-30 ENCOUNTER — TELEPHONE (OUTPATIENT)
Dept: INTERNAL MEDICINE | Facility: CLINIC | Age: 81
End: 2021-08-30

## 2021-09-22 ENCOUNTER — TELEPHONE (OUTPATIENT)
Dept: INTERNAL MEDICINE | Facility: CLINIC | Age: 81
End: 2021-09-22

## 2021-09-22 DIAGNOSIS — M81.0 AGE-RELATED OSTEOPOROSIS WITHOUT CURRENT PATHOLOGICAL FRACTURE: Primary | ICD-10-CM

## 2021-09-28 ENCOUNTER — CLINICAL SUPPORT (OUTPATIENT)
Dept: GASTROENTEROLOGY | Facility: CLINIC | Age: 81
End: 2021-09-28

## 2021-09-28 ENCOUNTER — PREP FOR SURGERY (OUTPATIENT)
Dept: OTHER | Facility: HOSPITAL | Age: 81
End: 2021-09-28

## 2021-09-28 DIAGNOSIS — R19.5 POSITIVE COLORECTAL CANCER SCREENING USING COLOGUARD TEST: Primary | ICD-10-CM

## 2021-09-28 RX ORDER — PRAVASTATIN SODIUM 10 MG
TABLET ORAL
COMMUNITY
End: 2021-10-06

## 2021-09-28 RX ORDER — ESCITALOPRAM OXALATE 10 MG/1
10 TABLET ORAL DAILY
COMMUNITY
End: 2022-01-17

## 2021-09-28 RX ORDER — OMEPRAZOLE 20 MG/1
TABLET, DELAYED RELEASE ORAL
COMMUNITY
End: 2021-11-30

## 2021-09-28 RX ORDER — DIPHENOXYLATE HYDROCHLORIDE AND ATROPINE SULFATE 2.5; .025 MG/1; MG/1
1 TABLET ORAL NIGHTLY
COMMUNITY

## 2021-09-28 NOTE — PROGRESS NOTES
Laura Cardenas     REASON FOR CALL- COLONOSCOPY, POS. COLO GUARD, LAST COLON IN 2017 DR AWAD    [facility]    History reviewed. No pertinent past medical history.  Allergies   Allergen Reactions   • Apixaban Hives     Past Surgical History:   Procedure Laterality Date   • BACK SURGERY  1990   • CATARACT EXTRACTION     • COLONOSCOPY  2017    DR. AWAD   • REPLACEMENT TOTAL KNEE      BOTH KNEES   • TONSILLECTOMY      10 YEARS OLD     Social History     Socioeconomic History   • Marital status:      Spouse name: Not on file   • Number of children: Not on file   • Years of education: Not on file   • Highest education level: Not on file   Tobacco Use   • Smoking status: Former Smoker   • Smokeless tobacco: Never Used     History reviewed. No pertinent family history.    Current Outpatient Medications:   •  Calcium Carbonate 1500 (600 Ca) MG tablet, Calcium 600 600 mg (1,500 mg) oral tablet take 1 tablet by oral route 2 times a day   Active, Disp: , Rfl:   •  escitalopram (Lexapro) 10 MG tablet, TAKE ONE TABLET BY MOUTH DAILY, Disp: , Rfl:   •  multivitamin (THERAGRAN) tablet tablet, Daily Vitamin oral tablet take 1 tablet by oral route daily   Active, Disp: , Rfl:   •  omeprazole OTC (PrilOSEC OTC) 20 MG EC tablet, Prilosec OTC 20 mg oral tablet,delayed release (DR/EC) take 1 tablet by oral route daily   Active, Disp: , Rfl:   •  pravastatin (PRAVACHOL) 10 MG tablet, pravastatin 10 mg oral tablet take 1 tablet (10 mg) by oral route once daily at bedtime   Active, Disp: , Rfl:

## 2021-10-03 PROBLEM — M15.0 PRIMARY OSTEOARTHRITIS INVOLVING MULTIPLE JOINTS: Status: ACTIVE | Noted: 2021-10-03

## 2021-10-03 PROBLEM — M15.9 PRIMARY OSTEOARTHRITIS INVOLVING MULTIPLE JOINTS: Status: ACTIVE | Noted: 2021-10-03

## 2021-10-03 PROBLEM — M81.0 AGE-RELATED OSTEOPOROSIS WITHOUT CURRENT PATHOLOGICAL FRACTURE: Status: ACTIVE | Noted: 2021-10-03

## 2021-10-03 PROBLEM — E78.2 MIXED HYPERLIPIDEMIA: Status: ACTIVE | Noted: 2021-10-03

## 2021-10-03 PROBLEM — M81.0 AGE RELATED OSTEOPOROSIS: Status: ACTIVE | Noted: 2021-10-03

## 2021-10-03 PROBLEM — D50.9 IRON DEFICIENCY ANEMIA: Status: ACTIVE | Noted: 2021-10-03

## 2021-10-03 PROBLEM — I82.90 VENOUS THROMBOEMBOLISM (VTE): Status: ACTIVE | Noted: 2021-10-03

## 2021-10-03 PROBLEM — K62.5 RECTAL BLEEDING: Status: ACTIVE | Noted: 2021-10-03

## 2021-10-03 PROBLEM — Z12.39 ENCOUNTER FOR SCREENING FOR MALIGNANT NEOPLASM OF BREAST: Status: ACTIVE | Noted: 2021-10-03

## 2021-10-04 ENCOUNTER — LAB (OUTPATIENT)
Dept: LAB | Facility: HOSPITAL | Age: 81
End: 2021-10-04

## 2021-10-04 ENCOUNTER — TRANSCRIBE ORDERS (OUTPATIENT)
Dept: LAB | Facility: HOSPITAL | Age: 81
End: 2021-10-04

## 2021-10-04 DIAGNOSIS — E78.5 HYPERLIPIDEMIA, UNSPECIFIED HYPERLIPIDEMIA TYPE: ICD-10-CM

## 2021-10-04 DIAGNOSIS — R73.01 IMPAIRED FASTING GLUCOSE: ICD-10-CM

## 2021-10-04 DIAGNOSIS — E55.9 VITAMIN D DEFICIENCY: ICD-10-CM

## 2021-10-04 DIAGNOSIS — E78.5 HYPERLIPIDEMIA, UNSPECIFIED HYPERLIPIDEMIA TYPE: Primary | ICD-10-CM

## 2021-10-04 LAB
25(OH)D3 SERPL-MCNC: 62.9 NG/ML
ALBUMIN SERPL-MCNC: 4.1 G/DL (ref 3.5–5.2)
ALBUMIN/GLOB SERPL: 1.5 G/DL
ALP SERPL-CCNC: 65 U/L (ref 39–117)
ALT SERPL W P-5'-P-CCNC: 17 U/L (ref 1–33)
ANION GAP SERPL CALCULATED.3IONS-SCNC: 9.7 MMOL/L (ref 5–15)
AST SERPL-CCNC: 20 U/L (ref 1–32)
BILIRUB SERPL-MCNC: 0.4 MG/DL (ref 0–1.2)
BUN SERPL-MCNC: 17 MG/DL (ref 8–23)
BUN/CREAT SERPL: 22.7 (ref 7–25)
CALCIUM SPEC-SCNC: 9.5 MG/DL (ref 8.6–10.5)
CHLORIDE SERPL-SCNC: 105 MMOL/L (ref 98–107)
CHOLEST SERPL-MCNC: 175 MG/DL (ref 0–200)
CO2 SERPL-SCNC: 28.3 MMOL/L (ref 22–29)
CREAT SERPL-MCNC: 0.75 MG/DL (ref 0.57–1)
GFR SERPL CREATININE-BSD FRML MDRD: 75 ML/MIN/1.73
GLOBULIN UR ELPH-MCNC: 2.7 GM/DL
GLUCOSE SERPL-MCNC: 97 MG/DL (ref 65–99)
HBA1C MFR BLD: 6.2 % (ref 4.8–5.6)
HDLC SERPL-MCNC: 44 MG/DL (ref 40–60)
LDLC SERPL CALC-MCNC: 113 MG/DL (ref 0–100)
LDLC/HDLC SERPL: 2.54 {RATIO}
POTASSIUM SERPL-SCNC: 4.5 MMOL/L (ref 3.5–5.2)
PROT SERPL-MCNC: 6.8 G/DL (ref 6–8.5)
SODIUM SERPL-SCNC: 143 MMOL/L (ref 136–145)
TRIGL SERPL-MCNC: 96 MG/DL (ref 0–150)
VLDLC SERPL-MCNC: 18 MG/DL (ref 5–40)

## 2021-10-04 PROCEDURE — 80053 COMPREHEN METABOLIC PANEL: CPT

## 2021-10-04 PROCEDURE — 36415 COLL VENOUS BLD VENIPUNCTURE: CPT

## 2021-10-04 PROCEDURE — 80061 LIPID PANEL: CPT

## 2021-10-04 PROCEDURE — 83036 HEMOGLOBIN GLYCOSYLATED A1C: CPT

## 2021-10-04 PROCEDURE — 82306 VITAMIN D 25 HYDROXY: CPT

## 2021-10-04 NOTE — PROGRESS NOTES
"Chief Complaint  Follow-up (6 months, labs have been done)    Subjective          Laura Cardenas presents to [facility]     History of Present Illness    81-year-old female with underlying hyperlipidemia, IFG, reflux, who is coming in for 6-month follow-up.  We will review her labs and make further recommendations at that time.    Review of Systems   Constitutional: Negative for appetite change, fatigue and fever.   HENT: Negative for congestion and ear pain.    Eyes: Negative for blurred vision.   Respiratory: Negative for cough, chest tightness, shortness of breath and wheezing.    Cardiovascular: Negative for chest pain, palpitations and leg swelling.   Gastrointestinal: Negative for abdominal pain.   Genitourinary: Negative for difficulty urinating, dysuria and hematuria.   Musculoskeletal: Negative for arthralgias and gait problem.   Skin: Negative for skin lesions.   Neurological: Negative for syncope, memory problem and confusion.   Psychiatric/Behavioral: Negative for self-injury and depressed mood.       Objective   Vital Signs:   /72 (BP Location: Left arm, Patient Position: Sitting, Cuff Size: Adult)   Pulse 73   Temp 97.8 °F (36.6 °C) (Temporal)   Ht 157.5 cm (62\")   Wt 77 kg (169 lb 12.8 oz)   BMI 31.06 kg/m²           Physical Exam  Vitals and nursing note reviewed.   Constitutional:       General: She is not in acute distress.     Appearance: Normal appearance. She is not toxic-appearing.   HENT:      Head: Atraumatic.      Right Ear: External ear normal.      Left Ear: External ear normal.      Nose: Nose normal.      Mouth/Throat:      Mouth: Mucous membranes are moist.   Eyes:      General:         Right eye: No discharge.         Left eye: No discharge.      Extraocular Movements: Extraocular movements intact.      Pupils: Pupils are equal, round, and reactive to light.   Cardiovascular:      Rate and Rhythm: Normal rate and regular rhythm.      " Pulses: Normal pulses.      Heart sounds: Normal heart sounds. No murmur heard.   No gallop.    Pulmonary:      Effort: Pulmonary effort is normal. No respiratory distress.      Breath sounds: No wheezing, rhonchi or rales.   Abdominal:      General: There is no distension.      Palpations: Abdomen is soft. There is no mass.      Tenderness: There is no abdominal tenderness. There is no guarding.   Musculoskeletal:         General: No swelling or tenderness.      Cervical back: No tenderness.      Right lower leg: No edema.      Left lower leg: No edema.   Skin:     General: Skin is warm and dry.      Findings: No rash.   Neurological:      General: No focal deficit present.      Mental Status: She is alert and oriented to person, place, and time. Mental status is at baseline.      Motor: No weakness.      Gait: Gait normal.   Psychiatric:         Mood and Affect: Mood normal.         Thought Content: Thought content normal.          Result Review :   The following data was reviewed by: Ari Brady MD on 10/06/2021:                  Assessment and Plan    Diagnoses and all orders for this visit:    1. Mixed hyperlipidemia (Primary)  Overview:  LDL is up some from back in the spring, up from 95 to 113 as of 10/21 office visit.  The patient is 10-year risk is somewhere between 24 and 41% based on her being determined IFG versus diabetes.  She is only on 20 mg of pravastatin, I recommend 40 mg at this time and follow-up labs next year.    Orders:  -     Comprehensive Metabolic Panel; Future  -     Lipid Panel; Future    2. Impaired fasting glucose  Overview:  Patient's A1c is 6.2 as of 10/21 office visit.  Patient is stable with conservative dietary restrictions only.    Orders:  -     Hemoglobin A1c; Future    3. Vitamin D deficiency  Overview:  Vitamin D of 62 as of 10/21 is certainly in the good range, and this is just with over-the-counter multivitamin.  Continue same.      4. Gastro-esophageal reflux disease  "without esophagitis  Overview:  No dysphagia no significant dyspepsia on chronic PPI.  Patient stable to continue same dose of omeprazole.      5. Primary osteoarthritis involving multiple joints  Overview:  Patient stable with over-the-counter Tylenol as needed for generalized aches and pains. (ask about R ankle on RTO = lesion to be evaluated in [place].)      6. Age-related osteoporosis without current pathological fracture  Overview:  BMD 9/19 = spine -0.9, hip -1.7....Reclast x 3 as of 3/20---> patient has BMD scheduled in December, will wait to see that result before we make a determination regarding another dose of Reclast.      7. Encounter for screening mammogram for malignant neoplasm of breast  -     Mammo Screening Digital Tomosynthesis Bilateral With CAD; Future    Other orders  -     pravastatin (PRAVACHOL) 40 MG tablet; Take 1 tablet by mouth Every Night.  Dispense: 90 tablet; Refill: 1       --  --  OLDER NOTES:  VISIT 3/21:  ANNUAL MEDICARE WELLNESS EXAM 9/20 = reviewed all forms in office with pt; no new discoveries.  OVERWEIGHT and d/w plan of care=lower diet by 500 jim and 10,000 steps/day.  --  ANXIETY D/O per HPI 4/17 = gasps for breath periodically, ever since had tubes tied b/c relative told her it would do that; lost sister, daughter with pulm fibrosis, grand-daughter with blood clot and pregnant,  had MI few months ago, so will start SSRI...better 7/17 = no c/o.  --  PALPITATIONS = x3, at rest, but not very active though, very brief, but will add low dose beta blocker on RTO if same c/o.  \"HTN\" = white-coat or whathaveyou b/c is very normal elsewhere...says it's fine at home...wnl in office again today---> fine at home.  --  IFG is stable w/o meds=5.8 (1/18)...6.4---> 6.2 holding in 3/21.  LIPIDS  and rec tx (3/15)... down 40 to 130 and rec 20 mg dose now...LDL 90 is holding...94... LDL 76 and TG's neg, so does not need fish oil...83 in 9/20---> 95 in 3/21 is " ok.  --  ESSENTIAL TREMOR of head...neg si/sx parkinsons...d/w tx if she desires.  --  GERD w/o dysphagia on PPI qd and occ tums for breakthrough.  ZOSTER/RASH per HPI=flat vesicular, grouped and follows pattern of L4, so will treat as ZOSTER despite itch; call if no help...drying up, but pain now, so neurontin...no c/o...rec Shingrix 7/18.  --  VIT D DEF remains stable=45 (7/18)...44---> 51 in 9/20.  OSTEOPENIA...BMD 8/13 = spine -1.4, hip -1.5...BMD 4/17 = spine -1.4, hip -2.2 and I rec tx now with reclast 7/17...she tolerated this...BMD 9/19 = spine -0.9, hip -1.7....Reclast x 3 as of 3/20---> will repeat this fall '21.  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 Uses aleve rarely...daily now, so rec try to work tylenol in there instead...on bid aleve and I rec try to use tylenol instead once again at 1/18 OV... on tylenol and wants to add voltaren gel 1/19 OV.  RLS and will try sinemet 1/19 OV.  DVT after 2/20 L TKR=off meds as of 9/20 with no sxs.  --  --  MMG 12/4/20 per me.   COLON 2/17...severe tics...per Dr TIM Sethi...? last.  Pneumovax, Prevnar done; Hep A x2; Shingirx rec 7/18 = still not as of 7/19; Covid x 2 as of 2/21 with Moderna and I discussed with them to keep monitoring the news in regards to the booster.  (, 6/1/19 was 62 yrs, to retire this month 9/14; takes care of great-grandkids as of 7/19 OV; Daughter with Pulm Fibrosis moved in with them recently as of 3/21 OV; she is followed by Dr Avalos; on 8L as of 3/21; this daughter has daughter who is RN going for NP is caring for her--->she passed 8/21).    Follow Up   Return in about 6 months (around 4/6/2022).  Patient was given instructions and counseling regarding her condition or for health maintenance advice. Please see specific information pulled into the AVS if appropriate.

## 2021-10-06 ENCOUNTER — OFFICE VISIT (OUTPATIENT)
Dept: INTERNAL MEDICINE | Facility: CLINIC | Age: 81
End: 2021-10-06

## 2021-10-06 VITALS
TEMPERATURE: 97.8 F | SYSTOLIC BLOOD PRESSURE: 147 MMHG | WEIGHT: 169.8 LBS | HEIGHT: 62 IN | HEART RATE: 73 BPM | BODY MASS INDEX: 31.25 KG/M2 | DIASTOLIC BLOOD PRESSURE: 72 MMHG

## 2021-10-06 DIAGNOSIS — K21.9 GASTRO-ESOPHAGEAL REFLUX DISEASE WITHOUT ESOPHAGITIS: ICD-10-CM

## 2021-10-06 DIAGNOSIS — R73.01 IMPAIRED FASTING GLUCOSE: ICD-10-CM

## 2021-10-06 DIAGNOSIS — M15.9 PRIMARY OSTEOARTHRITIS INVOLVING MULTIPLE JOINTS: ICD-10-CM

## 2021-10-06 DIAGNOSIS — E55.9 VITAMIN D DEFICIENCY: ICD-10-CM

## 2021-10-06 DIAGNOSIS — E78.2 MIXED HYPERLIPIDEMIA: Primary | ICD-10-CM

## 2021-10-06 DIAGNOSIS — Z12.31 ENCOUNTER FOR SCREENING MAMMOGRAM FOR MALIGNANT NEOPLASM OF BREAST: ICD-10-CM

## 2021-10-06 DIAGNOSIS — M81.0 AGE-RELATED OSTEOPOROSIS WITHOUT CURRENT PATHOLOGICAL FRACTURE: ICD-10-CM

## 2021-10-06 PROBLEM — R19.5 POSITIVE COLORECTAL CANCER SCREENING USING COLOGUARD TEST: Status: ACTIVE | Noted: 2021-10-06

## 2021-10-06 PROCEDURE — 99214 OFFICE O/P EST MOD 30 MIN: CPT | Performed by: INTERNAL MEDICINE

## 2021-10-06 RX ORDER — PRAVASTATIN SODIUM 40 MG
40 TABLET ORAL NIGHTLY
Qty: 90 TABLET | Refills: 1 | Status: SHIPPED | OUTPATIENT
Start: 2021-10-06 | End: 2022-04-11

## 2021-10-06 RX ORDER — PRAVASTATIN SODIUM 20 MG
TABLET ORAL
COMMUNITY
Start: 2021-10-02 | End: 2021-10-06 | Stop reason: SDUPTHER

## 2021-10-29 PROCEDURE — 87077 CULTURE AEROBIC IDENTIFY: CPT | Performed by: NURSE PRACTITIONER

## 2021-10-29 PROCEDURE — 87186 SC STD MICRODIL/AGAR DIL: CPT | Performed by: NURSE PRACTITIONER

## 2021-10-29 PROCEDURE — 87086 URINE CULTURE/COLONY COUNT: CPT | Performed by: NURSE PRACTITIONER

## 2021-11-30 RX ORDER — ESOMEPRAZOLE MAGNESIUM 20 MG/1
20 FOR SUSPENSION ORAL
COMMUNITY

## 2021-12-01 ENCOUNTER — HOSPITAL ENCOUNTER (OUTPATIENT)
Facility: HOSPITAL | Age: 81
Setting detail: HOSPITAL OUTPATIENT SURGERY
Discharge: HOME | End: 2021-12-01
Attending: INTERNAL MEDICINE | Admitting: INTERNAL MEDICINE

## 2021-12-01 ENCOUNTER — ANESTHESIA (OUTPATIENT)
Dept: GASTROENTEROLOGY | Facility: HOSPITAL | Age: 81
End: 2021-12-01

## 2021-12-01 ENCOUNTER — ANESTHESIA EVENT (OUTPATIENT)
Dept: GASTROENTEROLOGY | Facility: HOSPITAL | Age: 81
End: 2021-12-01

## 2021-12-01 VITALS
BODY MASS INDEX: 31.64 KG/M2 | RESPIRATION RATE: 16 BRPM | HEIGHT: 62 IN | DIASTOLIC BLOOD PRESSURE: 73 MMHG | OXYGEN SATURATION: 94 % | WEIGHT: 171.96 LBS | SYSTOLIC BLOOD PRESSURE: 119 MMHG | TEMPERATURE: 97 F | HEART RATE: 65 BPM

## 2021-12-01 DIAGNOSIS — R19.5 POSITIVE COLORECTAL CANCER SCREENING USING COLOGUARD TEST: ICD-10-CM

## 2021-12-01 PROCEDURE — 25010000002 PROPOFOL 10 MG/ML EMULSION: Performed by: NURSE ANESTHETIST, CERTIFIED REGISTERED

## 2021-12-01 PROCEDURE — 88305 TISSUE EXAM BY PATHOLOGIST: CPT | Performed by: INTERNAL MEDICINE

## 2021-12-01 PROCEDURE — 45385 COLONOSCOPY W/LESION REMOVAL: CPT | Performed by: INTERNAL MEDICINE

## 2021-12-01 RX ORDER — SODIUM CHLORIDE, SODIUM LACTATE, POTASSIUM CHLORIDE, CALCIUM CHLORIDE 600; 310; 30; 20 MG/100ML; MG/100ML; MG/100ML; MG/100ML
1000 INJECTION, SOLUTION INTRAVENOUS CONTINUOUS
Status: DISCONTINUED | OUTPATIENT
Start: 2021-12-01 | End: 2021-12-01 | Stop reason: HOSPADM

## 2021-12-01 RX ORDER — SODIUM CHLORIDE, SODIUM LACTATE, POTASSIUM CHLORIDE, CALCIUM CHLORIDE 600; 310; 30; 20 MG/100ML; MG/100ML; MG/100ML; MG/100ML
30 INJECTION, SOLUTION INTRAVENOUS CONTINUOUS
Status: DISCONTINUED | OUTPATIENT
Start: 2021-12-01 | End: 2021-12-01 | Stop reason: HOSPADM

## 2021-12-01 RX ORDER — PROPOFOL 10 MG/ML
VIAL (ML) INTRAVENOUS AS NEEDED
Status: DISCONTINUED | OUTPATIENT
Start: 2021-12-01 | End: 2021-12-01 | Stop reason: SURG

## 2021-12-01 RX ORDER — LIDOCAINE HYDROCHLORIDE 20 MG/ML
INJECTION, SOLUTION INFILTRATION; PERINEURAL AS NEEDED
Status: DISCONTINUED | OUTPATIENT
Start: 2021-12-01 | End: 2021-12-01 | Stop reason: SURG

## 2021-12-01 RX ADMIN — LIDOCAINE HYDROCHLORIDE 60 MG: 20 INJECTION, SOLUTION INFILTRATION; PERINEURAL at 07:43

## 2021-12-01 RX ADMIN — PROPOFOL 125 MCG/KG/MIN: 10 INJECTION, EMULSION INTRAVENOUS at 07:43

## 2021-12-01 RX ADMIN — SODIUM CHLORIDE, POTASSIUM CHLORIDE, SODIUM LACTATE AND CALCIUM CHLORIDE 1000 ML: 600; 310; 30; 20 INJECTION, SOLUTION INTRAVENOUS at 06:38

## 2021-12-01 RX ADMIN — PROPOFOL 50 MG: 10 INJECTION, EMULSION INTRAVENOUS at 07:43

## 2021-12-01 NOTE — ANESTHESIA PREPROCEDURE EVALUATION
Anesthesia Evaluation     Patient summary reviewed and Nursing notes reviewed   no history of anesthetic complications:  NPO Solid Status: > 8 hours  NPO Liquid Status: > 2 hours           Airway   Mallampati: II  TM distance: >3 FB  Neck ROM: full  No difficulty expected  Dental      Pulmonary - negative pulmonary ROS and normal exam    breath sounds clear to auscultation  Cardiovascular - normal exam  Exercise tolerance: good (4-7 METS)    Rhythm: regular    (+) DVT, hyperlipidemia,       Neuro/Psych  (+) headaches,     GI/Hepatic/Renal/Endo    (+)  GERD, GI bleeding lower ,     Musculoskeletal     Abdominal    Substance History - negative use     OB/GYN negative ob/gyn ROS         Other   arthritis,                      Anesthesia Plan    ASA 2     general and MAC   (Patient understands anesthesia not responsible for dental damage.)  intravenous induction     Anesthetic plan, all risks, benefits, and alternatives have been provided, discussed and informed consent has been obtained with: patient.  Use of blood products discussed with patient .   Plan discussed with CRNA.

## 2021-12-01 NOTE — H&P
Pre Procedure History & Physical    Chief Complaint:   +cologuard    Subjective     HPI:   +cologuard    Past Medical History:   Past Medical History:   Diagnosis Date   • Acute embolism and thrombosis of deep vein of left lower extremity (HCC)    • Age-related osteoporosis without current pathological fracture    • Disorder of bone, unspecified    • Diverticulosis    • DJD (degenerative joint disease)    • GERD without esophagitis    • History of transfusion     59 years ago for childbirth   • Mixed hyperlipidemia    • Primary generalized (osteo)arthritis    • Spinal headache        Past Surgical History:  Past Surgical History:   Procedure Laterality Date   • BACK SURGERY  1990   • BACK SURGERY      S/P Lumbar   • BREAST BIOPSY      Breast Bx (83 & 98)   • CATARACT EXTRACTION      12/11/2015 L Cataract 12/04/2015 R Cataract   • COLONOSCOPY  2017    DR. AWAD   • REPLACEMENT TOTAL KNEE Bilateral     04/10/2019 right knee replacement 01/2020 knee replacement left   • TONSILLECTOMY      10 YEARS OLD       Family History:  Family History   Problem Relation Age of Onset   • Cancer Father    • Cancer Brother    • Malig Hyperthermia Neg Hx        Social History:   reports that she quit smoking about 25 years ago. She has a 19.50 pack-year smoking history. She has never used smokeless tobacco. She reports that she does not drink alcohol and does not use drugs.    Medications:   Medications Prior to Admission   Medication Sig Dispense Refill Last Dose   • Calcium Carbonate 1500 (600 Ca) MG tablet Take 600 mg by mouth 2 (Two) Times a Day With Meals.   Past Week at Unknown time   • escitalopram (Lexapro) 10 MG tablet Take 10 mg by mouth Daily.   Past Week at Unknown time   • esomeprazole (NexIUM) 20 MG packet Take 20 mg by mouth Every Morning Before Breakfast.   11/30/2021 at Unknown time   • multivitamin (THERAGRAN) tablet tablet Take 1 tablet by mouth Every Night.   Past Week at Unknown time   • pravastatin (PRAVACHOL) 40  "MG tablet Take 1 tablet by mouth Every Night. 90 tablet 1 Past Week at Unknown time       Allergies:  Apixaban        Objective     Blood pressure 114/57, pulse 64, temperature 97.8 °F (36.6 °C), temperature source Temporal, resp. rate 16, height 157.5 cm (62.01\"), weight 78 kg (171 lb 15.3 oz), SpO2 95 %.    Physical Exam   Constitutional: Pt is oriented to person, place, and time and well-developed, well-nourished, and in no distress.   Mouth/Throat: Oropharynx is clear and moist.   Neck: Normal range of motion.   Cardiovascular: Normal rate, regular rhythm and normal heart sounds.    Pulmonary/Chest: Effort normal and breath sounds normal.   Abdominal: Soft. Nontender  Skin: Skin is warm and dry.   Psychiatric: Mood, memory, affect and judgment normal.     Assessment/Plan     Diagnosis:  +cologuard    Anticipated Surgical Procedure:  colonoscopy    The risks, benefits, and alternatives of this procedure have been discussed with the patient or the responsible party- the patient understands and agrees to proceed.            "

## 2021-12-01 NOTE — ANESTHESIA POSTPROCEDURE EVALUATION
Patient: Laura Cardenas    Procedure Summary     Date: 12/01/21 Room / Location: [facility] ENDOSCOPY 2 / [facility] ENDOSCOPY    Anesthesia Start: 0741 Anesthesia Stop: 0821    Procedure: COLONOSCOPY, WITH HOT SNARE POLYPECTOMY, (N/A ) Diagnosis:       Positive colorectal cancer screening using Cologuard test      (Positive colorectal cancer screening using Cologuard test [R19.5])    Surgeons: Tony Thomas MD Provider: Sharifa Renee MD    Anesthesia Type: general, MAC ASA Status: 2          Anesthesia Type: general, MAC    Vitals  Vitals Value Taken Time   /61 12/01/21 0829   Temp 36.2 °C (97.2 °F) 12/01/21 0819   Pulse 63 12/01/21 0831   Resp 17 12/01/21 0829   SpO2 97 % 12/01/21 0831   Vitals shown include unvalidated device data.        Post Anesthesia Care and Evaluation    Patient location during evaluation: bedside  Patient participation: complete - patient participated  Level of consciousness: awake  Pain score: 0  Pain management: adequate  Airway patency: patent  Anesthetic complications: No anesthetic complications  PONV Status: none  Cardiovascular status: acceptable and stable  Respiratory status: acceptable and room air  Hydration status: acceptable    Comments: An Anesthesiologist personally participated in the most demanding procedures (including induction and emergence if applicable) in the anesthesia plan, monitored the course of anesthesia administration at frequent intervals and remained physically present and available for immediate diagnosis and treatment of emergencies.

## 2021-12-02 LAB
CYTO UR: NORMAL
LAB AP CASE REPORT: NORMAL
LAB AP CLINICAL INFORMATION: NORMAL
PATH REPORT.FINAL DX SPEC: NORMAL
PATH REPORT.GROSS SPEC: NORMAL

## 2021-12-16 ENCOUNTER — HOSPITAL ENCOUNTER (OUTPATIENT)
Dept: BONE DENSITY | Facility: HOSPITAL | Age: 81
Discharge: HOME | End: 2021-12-16

## 2021-12-16 ENCOUNTER — HOSPITAL ENCOUNTER (OUTPATIENT)
Dept: MAMMOGRAPHY | Facility: HOSPITAL | Age: 81
Discharge: HOME | End: 2021-12-16

## 2021-12-16 DIAGNOSIS — M81.0 AGE-RELATED OSTEOPOROSIS WITHOUT CURRENT PATHOLOGICAL FRACTURE: ICD-10-CM

## 2021-12-16 DIAGNOSIS — Z12.31 ENCOUNTER FOR SCREENING MAMMOGRAM FOR MALIGNANT NEOPLASM OF BREAST: ICD-10-CM

## 2021-12-16 PROCEDURE — 77067 SCR MAMMO BI INCL CAD: CPT

## 2021-12-16 PROCEDURE — 77063 BREAST TOMOSYNTHESIS BI: CPT

## 2021-12-16 PROCEDURE — 77080 DXA BONE DENSITY AXIAL: CPT

## 2021-12-23 ENCOUNTER — APPOINTMENT (OUTPATIENT)
Dept: MAMMOGRAPHY | Facility: HOSPITAL | Age: 81
End: 2021-12-23

## 2022-01-17 RX ORDER — ESCITALOPRAM OXALATE 10 MG/1
TABLET ORAL
Qty: 90 TABLET | Refills: 1 | Status: SHIPPED | OUTPATIENT
Start: 2022-01-17 | End: 2022-08-08

## 2022-03-23 ENCOUNTER — LAB (OUTPATIENT)
Dept: LAB | Facility: HOSPITAL | Age: 82
End: 2022-03-23

## 2022-03-23 DIAGNOSIS — R73.01 IMPAIRED FASTING GLUCOSE: ICD-10-CM

## 2022-03-23 DIAGNOSIS — E78.2 MIXED HYPERLIPIDEMIA: ICD-10-CM

## 2022-03-23 LAB
ALBUMIN SERPL-MCNC: 4.3 G/DL (ref 3.5–5.2)
ALBUMIN/GLOB SERPL: 1.7 G/DL
ALP SERPL-CCNC: 66 U/L (ref 39–117)
ALT SERPL W P-5'-P-CCNC: 16 U/L (ref 1–33)
ANION GAP SERPL CALCULATED.3IONS-SCNC: 12 MMOL/L (ref 5–15)
AST SERPL-CCNC: 23 U/L (ref 1–32)
BILIRUB SERPL-MCNC: 0.6 MG/DL (ref 0–1.2)
BUN SERPL-MCNC: 17 MG/DL (ref 8–23)
BUN/CREAT SERPL: 18.9 (ref 7–25)
CALCIUM SPEC-SCNC: 9.5 MG/DL (ref 8.6–10.5)
CHLORIDE SERPL-SCNC: 104 MMOL/L (ref 98–107)
CHOLEST SERPL-MCNC: 163 MG/DL (ref 0–200)
CO2 SERPL-SCNC: 24 MMOL/L (ref 22–29)
CREAT SERPL-MCNC: 0.9 MG/DL (ref 0.57–1)
EGFRCR SERPLBLD CKD-EPI 2021: 65.2 ML/MIN/1.73
GLOBULIN UR ELPH-MCNC: 2.5 GM/DL
GLUCOSE SERPL-MCNC: 105 MG/DL (ref 65–99)
HBA1C MFR BLD: 6.4 % (ref 4.8–5.6)
HDLC SERPL-MCNC: 38 MG/DL (ref 40–60)
LDLC SERPL CALC-MCNC: 99 MG/DL (ref 0–100)
LDLC/HDLC SERPL: 2.52 {RATIO}
POTASSIUM SERPL-SCNC: 4.1 MMOL/L (ref 3.5–5.2)
PROT SERPL-MCNC: 6.8 G/DL (ref 6–8.5)
SODIUM SERPL-SCNC: 140 MMOL/L (ref 136–145)
TRIGL SERPL-MCNC: 146 MG/DL (ref 0–150)
VLDLC SERPL-MCNC: 26 MG/DL (ref 5–40)

## 2022-03-23 PROCEDURE — 83036 HEMOGLOBIN GLYCOSYLATED A1C: CPT

## 2022-03-23 PROCEDURE — 80053 COMPREHEN METABOLIC PANEL: CPT

## 2022-03-23 PROCEDURE — 80061 LIPID PANEL: CPT

## 2022-03-23 PROCEDURE — 36415 COLL VENOUS BLD VENIPUNCTURE: CPT

## 2022-03-27 NOTE — ASSESSMENT & PLAN NOTE
LDL is down some, it was 113 around 10/21 and is now down to 99. Patient is doing well with pravastatin 40 mg qhs.   Pt is also prediabetic.  Encouraged management through lifestyle by monitoring her diet.  Plan: Cont pravastatin 40 mg qhs.

## 2022-03-27 NOTE — ASSESSMENT & PLAN NOTE
A1C is up to 6.4%. Prediabetes. Recommend dietary and lifestyle modifications. We will recheck A1C on next set of labs.

## 2022-03-27 NOTE — PROGRESS NOTES
"Chief Complaint  Labs Only (6 month follow up with labs)    Subjective          History of Present Illness  Laura Cardenas presents to [facility]  For 6 month follow up on hyperlipidemia, IFG, and reflux. She also had labs completed.    She has had US of veins-She does wear AMANDA hose and they help.     She denies any chest pain, soa or palpitations, changes in bowel/bladder function.   Objective   Vital Signs:   /80 (BP Location: Left arm, Patient Position: Sitting, Cuff Size: Adult) Comment: patient has had a cup of coffee this morning  Pulse 68   Temp 98.4 °F (36.9 °C) (Temporal)   Resp 18   Ht 157.5 cm (62.01\")   Wt 73.5 kg (162 lb)   SpO2 99%   BMI 29.62 kg/m²     Physical Exam  Constitutional:       Appearance: Normal appearance.   HENT:      Head: Normocephalic and atraumatic.   Eyes:      Extraocular Movements: Extraocular movements intact.      Conjunctiva/sclera: Conjunctivae normal.   Cardiovascular:      Rate and Rhythm: Normal rate and regular rhythm.      Pulses: Normal pulses.      Heart sounds: Normal heart sounds.   Pulmonary:      Effort: Pulmonary effort is normal. No respiratory distress.      Breath sounds: Normal breath sounds. No stridor. No wheezing, rhonchi or rales.   Abdominal:      General: Bowel sounds are normal.      Palpations: Abdomen is soft.      Tenderness: There is no abdominal tenderness.   Musculoskeletal:      Cervical back: Normal range of motion.      Right lower leg: Edema (trace) present.      Left lower leg: Edema (trace) present.   Skin:     General: Skin is warm and dry.   Neurological:      Mental Status: She is alert and oriented to person, place, and time.   Psychiatric:         Mood and Affect: Mood normal.         Behavior: Behavior normal.         Thought Content: Thought content normal.         Judgment: Judgment normal.        Result Review :   The following data was reviewed by: JOSE Guerrier on " 03/28/2022:  CMP    CMP 10/4/21 3/23/22   Glucose 97 105 (A)   BUN 17 17   Creatinine 0.75 0.90   eGFR Non African Am 75    Sodium 143 140   Potassium 4.5 4.1   Chloride 105 104   Calcium 9.5 9.5   Albumin 4.10 4.30   Total Bilirubin 0.4 0.6   Alkaline Phosphatase 65 66   AST (SGOT) 20 23   ALT (SGPT) 17 16   (A) Abnormal value              Lipid Panel    Lipid Panel 10/4/21 3/23/22   Total Cholesterol 175 163   Triglycerides 96 146   HDL Cholesterol 44 38 (A)   VLDL Cholesterol 18 26   LDL Cholesterol  113 (A) 99   LDL/HDL Ratio 2.54 2.52   (A) Abnormal value                Most Recent A1C    HGBA1C Most Recent 3/23/22   Hemoglobin A1C 6.40 (A)   (A) Abnormal value                      Assessment and Plan    Diagnoses and all orders for this visit:    1. Impaired fasting glucose (Primary)  Assessment & Plan:  A1C is up to 6.4%. Prediabetes. Recommend dietary and lifestyle modifications. We will recheck A1C on next set of labs.      Orders:  -     Comprehensive metabolic panel; Future  -     CBC w AUTO Differential; Future  -     Hemoglobin A1c; Future    2. Gastro-esophageal reflux disease without esophagitis  Assessment & Plan:  Stable.  She is on the nexium daily and it helps.  Plan: Continue nexium.       3. Mixed hyperlipidemia  Assessment & Plan:  LDL is down some, it was 113 around 10/21 and is now down to 99. Patient is doing well with pravastatin 40 mg qhs.   Pt is also prediabetic.  Encouraged management through lifestyle by monitoring her diet.  Plan: Cont pravastatin 40 mg qhs.    Orders:  -     Lipid panel; Future  -     Comprehensive metabolic panel; Future  -     CBC w AUTO Differential; Future  -     TSH; Future    4. Osteopenia of necks of both femurs  Assessment & Plan:  Osteopenia of femoral neck on DEXA 12/2021.   Recommend caltrate + D supplement.  Continue with physical activity as tolerated.       Orders:  -     Vitamin D 25 hydroxy; Future      Follow Up   No follow-ups on file.  Patient was  given instructions and counseling regarding her condition or for health maintenance advice. Please see specific information pulled into the AVS if appropriate.

## 2022-03-28 ENCOUNTER — OFFICE VISIT (OUTPATIENT)
Dept: INTERNAL MEDICINE | Facility: CLINIC | Age: 82
End: 2022-03-28

## 2022-03-28 VITALS
TEMPERATURE: 98.4 F | SYSTOLIC BLOOD PRESSURE: 140 MMHG | HEART RATE: 68 BPM | OXYGEN SATURATION: 99 % | HEIGHT: 62 IN | DIASTOLIC BLOOD PRESSURE: 80 MMHG | BODY MASS INDEX: 29.81 KG/M2 | RESPIRATION RATE: 18 BRPM | WEIGHT: 162 LBS

## 2022-03-28 DIAGNOSIS — M85.851 OSTEOPENIA OF NECKS OF BOTH FEMURS: ICD-10-CM

## 2022-03-28 DIAGNOSIS — R73.01 IMPAIRED FASTING GLUCOSE: Primary | ICD-10-CM

## 2022-03-28 DIAGNOSIS — M85.852 OSTEOPENIA OF NECKS OF BOTH FEMURS: ICD-10-CM

## 2022-03-28 DIAGNOSIS — K21.9 GASTRO-ESOPHAGEAL REFLUX DISEASE WITHOUT ESOPHAGITIS: ICD-10-CM

## 2022-03-28 DIAGNOSIS — E78.2 MIXED HYPERLIPIDEMIA: ICD-10-CM

## 2022-03-28 PROCEDURE — 99214 OFFICE O/P EST MOD 30 MIN: CPT

## 2022-03-28 NOTE — ASSESSMENT & PLAN NOTE
Osteopenia of femoral neck on DEXA 12/2021.   Recommend caltrate + D supplement.  Continue with physical activity as tolerated.

## 2022-04-11 RX ORDER — PRAVASTATIN SODIUM 40 MG
TABLET ORAL
Qty: 90 TABLET | Refills: 1 | Status: SHIPPED | OUTPATIENT
Start: 2022-04-11 | End: 2022-10-17

## 2022-08-08 RX ORDER — ESCITALOPRAM OXALATE 10 MG/1
TABLET ORAL
Qty: 90 TABLET | Refills: 1 | Status: SHIPPED | OUTPATIENT
Start: 2022-08-08 | End: 2023-02-06

## 2022-09-21 ENCOUNTER — LAB (OUTPATIENT)
Dept: LAB | Facility: HOSPITAL | Age: 82
End: 2022-09-21

## 2022-09-21 DIAGNOSIS — E78.2 MIXED HYPERLIPIDEMIA: ICD-10-CM

## 2022-09-21 DIAGNOSIS — M85.851 OSTEOPENIA OF NECKS OF BOTH FEMURS: ICD-10-CM

## 2022-09-21 DIAGNOSIS — M85.852 OSTEOPENIA OF NECKS OF BOTH FEMURS: ICD-10-CM

## 2022-09-21 DIAGNOSIS — R73.01 IMPAIRED FASTING GLUCOSE: ICD-10-CM

## 2022-09-21 LAB
25(OH)D3 SERPL-MCNC: 69.6 NG/ML (ref 30–100)
ALBUMIN SERPL-MCNC: 4 G/DL (ref 3.5–5.2)
ALBUMIN/GLOB SERPL: 1.4 G/DL
ALP SERPL-CCNC: 67 U/L (ref 39–117)
ALT SERPL W P-5'-P-CCNC: 17 U/L (ref 1–33)
ANION GAP SERPL CALCULATED.3IONS-SCNC: 10.8 MMOL/L (ref 5–15)
AST SERPL-CCNC: 22 U/L (ref 1–32)
BASOPHILS # BLD AUTO: 0.06 10*3/MM3 (ref 0–0.2)
BASOPHILS NFR BLD AUTO: 0.8 % (ref 0–1.5)
BILIRUB SERPL-MCNC: 0.4 MG/DL (ref 0–1.2)
BUN SERPL-MCNC: 19 MG/DL (ref 8–23)
BUN/CREAT SERPL: 25.3 (ref 7–25)
CALCIUM SPEC-SCNC: 10.5 MG/DL (ref 8.6–10.5)
CHLORIDE SERPL-SCNC: 101 MMOL/L (ref 98–107)
CHOLEST SERPL-MCNC: 134 MG/DL (ref 0–200)
CO2 SERPL-SCNC: 26.2 MMOL/L (ref 22–29)
CREAT SERPL-MCNC: 0.75 MG/DL (ref 0.57–1)
DEPRECATED RDW RBC AUTO: 38.4 FL (ref 37–54)
EGFRCR SERPLBLD CKD-EPI 2021: 80.6 ML/MIN/1.73
EOSINOPHIL # BLD AUTO: 0.16 10*3/MM3 (ref 0–0.4)
EOSINOPHIL NFR BLD AUTO: 2.2 % (ref 0.3–6.2)
ERYTHROCYTE [DISTWIDTH] IN BLOOD BY AUTOMATED COUNT: 11.8 % (ref 12.3–15.4)
GLOBULIN UR ELPH-MCNC: 2.8 GM/DL
GLUCOSE SERPL-MCNC: 105 MG/DL (ref 65–99)
HBA1C MFR BLD: 6.5 % (ref 4.8–5.6)
HCT VFR BLD AUTO: 37.8 % (ref 34–46.6)
HDLC SERPL-MCNC: 33 MG/DL (ref 40–60)
HGB BLD-MCNC: 13 G/DL (ref 12–15.9)
IMM GRANULOCYTES # BLD AUTO: 0.03 10*3/MM3 (ref 0–0.05)
IMM GRANULOCYTES NFR BLD AUTO: 0.4 % (ref 0–0.5)
LDLC SERPL CALC-MCNC: 77 MG/DL (ref 0–100)
LDLC/HDLC SERPL: 2.25 {RATIO}
LYMPHOCYTES # BLD AUTO: 2.94 10*3/MM3 (ref 0.7–3.1)
LYMPHOCYTES NFR BLD AUTO: 40.2 % (ref 19.6–45.3)
MCH RBC QN AUTO: 30.5 PG (ref 26.6–33)
MCHC RBC AUTO-ENTMCNC: 34.4 G/DL (ref 31.5–35.7)
MCV RBC AUTO: 88.7 FL (ref 79–97)
MONOCYTES # BLD AUTO: 0.79 10*3/MM3 (ref 0.1–0.9)
MONOCYTES NFR BLD AUTO: 10.8 % (ref 5–12)
NEUTROPHILS NFR BLD AUTO: 3.34 10*3/MM3 (ref 1.7–7)
NEUTROPHILS NFR BLD AUTO: 45.6 % (ref 42.7–76)
NRBC BLD AUTO-RTO: 0 /100 WBC (ref 0–0.2)
PLATELET # BLD AUTO: 252 10*3/MM3 (ref 140–450)
PMV BLD AUTO: 9.5 FL (ref 6–12)
POTASSIUM SERPL-SCNC: 4.4 MMOL/L (ref 3.5–5.2)
PROT SERPL-MCNC: 6.8 G/DL (ref 6–8.5)
RBC # BLD AUTO: 4.26 10*6/MM3 (ref 3.77–5.28)
SODIUM SERPL-SCNC: 138 MMOL/L (ref 136–145)
TRIGL SERPL-MCNC: 133 MG/DL (ref 0–150)
TSH SERPL DL<=0.05 MIU/L-ACNC: 2.18 UIU/ML (ref 0.27–4.2)
VLDLC SERPL-MCNC: 24 MG/DL (ref 5–40)
WBC NRBC COR # BLD: 7.32 10*3/MM3 (ref 3.4–10.8)

## 2022-09-21 PROCEDURE — 36415 COLL VENOUS BLD VENIPUNCTURE: CPT

## 2022-09-21 PROCEDURE — 85025 COMPLETE CBC W/AUTO DIFF WBC: CPT

## 2022-09-21 PROCEDURE — 80053 COMPREHEN METABOLIC PANEL: CPT

## 2022-09-21 PROCEDURE — 83036 HEMOGLOBIN GLYCOSYLATED A1C: CPT

## 2022-09-21 PROCEDURE — 82306 VITAMIN D 25 HYDROXY: CPT

## 2022-09-21 PROCEDURE — 84443 ASSAY THYROID STIM HORMONE: CPT

## 2022-09-21 PROCEDURE — 80061 LIPID PANEL: CPT

## 2022-09-29 PROBLEM — Z12.39 ENCOUNTER FOR SCREENING FOR MALIGNANT NEOPLASM OF BREAST: Status: RESOLVED | Noted: 2021-10-03 | Resolved: 2022-09-29

## 2022-09-29 NOTE — PROGRESS NOTES
"Chief Complaint  Hyperlipidemia and Follow-up (Pt states that this is a routine and she had labs, she had no new issues. )    Subjective          Laura Cardenas presents to [facility]     History of Present Illness  82-year-old female with underlying hyperlipidemia, IFG, reflux, who is coming in 9/22 for 6-12-month follow-up.  We will go over her med list in detail, review her labs, and make further recommendations at that time.    Review of Systems   Constitutional: Negative for appetite change, fatigue and fever.   HENT: Negative for congestion and ear pain.    Eyes: Negative for blurred vision.   Respiratory: Negative for cough, chest tightness, shortness of breath and wheezing.    Cardiovascular: Negative for chest pain, palpitations and leg swelling.   Gastrointestinal: Negative for abdominal pain.   Genitourinary: Negative for difficulty urinating, dysuria and hematuria.   Musculoskeletal: Negative for arthralgias and gait problem.   Skin: Negative for skin lesions.   Neurological: Negative for syncope, memory problem and confusion.   Psychiatric/Behavioral: Negative for self-injury and depressed mood.       Objective   Vital Signs:   /80 (BP Location: Left arm, Patient Position: Sitting, Cuff Size: Adult)   Pulse 74   Temp 97.7 °F (36.5 °C)   Ht 157.5 cm (62.01\")   Wt 75.9 kg (167 lb 6.4 oz)   SpO2 97%   BMI 30.61 kg/m²           Physical Exam  Vitals and nursing note reviewed.   Constitutional:       General: She is not in acute distress.     Appearance: Normal appearance. She is not toxic-appearing.   HENT:      Head: Atraumatic.      Right Ear: External ear normal.      Left Ear: External ear normal.      Nose: Nose normal.      Mouth/Throat:      Mouth: Mucous membranes are moist.   Eyes:      General:         Right eye: No discharge.         Left eye: No discharge.      Extraocular Movements: Extraocular movements intact.      Pupils: Pupils are equal, " round, and reactive to light.   Cardiovascular:      Rate and Rhythm: Normal rate and regular rhythm.      Pulses: Normal pulses.      Heart sounds: Normal heart sounds. No murmur heard.    No gallop.   Pulmonary:      Effort: Pulmonary effort is normal. No respiratory distress.      Breath sounds: No wheezing, rhonchi or rales.   Abdominal:      General: There is no distension.      Palpations: Abdomen is soft. There is no mass.      Tenderness: There is no abdominal tenderness. There is no guarding.   Musculoskeletal:         General: No swelling or tenderness.      Cervical back: No tenderness.      Right lower leg: No edema.      Left lower leg: No edema.   Skin:     General: Skin is warm and dry.      Findings: No rash.   Neurological:      General: No focal deficit present.      Mental Status: She is alert and oriented to person, place, and time. Mental status is at baseline.      Motor: No weakness.      Gait: Gait normal.   Psychiatric:         Mood and Affect: Mood normal.         Thought Content: Thought content normal.          Result Review :   The following data was reviewed by: Ari Brady MD on 10/06/2021:                  Assessment and Plan    Diagnoses and all orders for this visit:    1. Mixed hyperlipidemia (Primary)  Overview:  LDL is up some from back in the spring, up from 95 to 113 as of 10/21 office visit.  The patient is 10-year risk is somewhere between 24 and 41% based on her being determined IFG versus diabetes.  She is only on 20 mg of pravastatin, I recommend 40 mg at this time and follow-up labs next year.    Assessment & Plan:  LDL is down further now to 77 as of her 9/22 office visit.  This is at goal for her, she is being treated for primary prevention, so we will continue with moderate dose pravastatin only.    Orders:  -     Comprehensive Metabolic Panel; Future  -     Lipid Panel; Future    2. Impaired fasting glucose  Assessment & Plan:  Patient's A1c is 6.2 as of 10/21  office visit.  Patient is stable with conservative dietary restrictions only.---> A1c was 6.4 in 3/22, and currently in 9/22 it is 6.5.  This is still reasonable, no indication for treatment of yet, patient will look at some of the simple sugars in her diet.      Orders:  -     Hemoglobin A1c; Future    3. Gastro-esophageal reflux disease without esophagitis  Assessment & Plan:  No dysphagia no significant dyspepsia on chronic PPI as of 9/22..  Patient stable to continue same dose of omeprazole.        4. Need for vaccination  -     Fluzone High-Dose 65+yrs ([id number])  -     COVID-19 Bivalent Booster (Pfizer) 12+yrs    5. Age-related osteoporosis without current pathological fracture  Overview:  BMD 9/19 = spine -0.9, hip -1.7....Reclast x 3 as of 3/20---> patient has BMD scheduled in December, will wait to see that result before we make a determination regarding another dose of Reclast.    BMD 12/21:  Spine -0.1  Hip -1.3.    Assessment & Plan:  We reviewed her BMD from 12/21 at her 9/22 office visit.  She had a's much better than expected improvement with just 3 doses of Reclast, certainly no indication for treatment at this time, patient to continue with calcium and vitamin D supplementation as well as keeping physically active.      6. Vitamin D deficiency  Assessment & Plan:  Vitamin D of 62 as of 10/21 is certainly in the good range, and this is just with over-the-counter multivitamin.  Continue same.---> Vitamin D is 69 as of 9/22 office visit, patient will continue with low-dose over-the-counter supplementation.        7. Breast cancer screening by mammogram  -     Mammo Screening Digital Tomosynthesis Bilateral With CAD; Future     --  --  OLDER NOTES:  VISIT 3/21:  ANNUAL MEDICARE WELLNESS EXAM 9/20 = reviewed all forms in office with pt; no new discoveries.  OVERWEIGHT and d/w plan of care=lower diet by 500 jim and 10,000 steps/day.  --  ANXIETY D/O per HPI 4/17 = gasps for breath periodically, ever  "since had tubes tied b/c relative told her it would do that; lost sister, daughter with pulm fibrosis, grand-daughter with blood clot and pregnant,  had MI few months ago, so will start SSRI...better 7/17 = no c/o.  --  PALPITATIONS = x3, at rest, but not very active though, very brief, but will add low dose beta blocker on RTO if same c/o.  \"HTN\" = white-coat or whathaveyou b/c is very normal elsewhere...says it's fine at home...wnl in office again today---> fine at home.  --  IFG is stable w/o meds=5.8 (1/18)...6.4---> 6.2 holding in 3/21.  LIPIDS  and rec tx (3/15)... down 40 to 130 and rec 20 mg dose now...LDL 90 is holding...94... LDL 76 and TG's neg, so does not need fish oil...83 in 9/20---> 95 in 3/21 is ok.  --  ESSENTIAL TREMOR of head...neg si/sx parkinsons...d/w tx if she desires.  --  GERD w/o dysphagia on PPI qd and occ tums for breakthrough.  ZOSTER/RASH per HPI=flat vesicular, grouped and follows pattern of L4, so will treat as ZOSTER despite itch; call if no help...drying up, but pain now, so neurontin...no c/o...rec Shingrix 7/18.  --  VIT D DEF remains stable=45 (7/18)...44---> 51 in 9/20.  OSTEOPENIA...BMD 8/13 = spine -1.4, hip -1.5...BMD 4/17 = spine -1.4, hip -2.2 and I rec tx now with reclast 7/17...she tolerated this...BMD 9/19 = spine -0.9, hip -1.7....Reclast x 3 as of 3/20---> will repeat this fall '21.  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 Uses aleve rarely...daily now, so rec try to work tylenol in there instead...on bid aleve and I rec try to use tylenol instead once again at 1/18 OV... on tylenol and wants to add voltaren gel 1/19 OV.  RLS and will try sinemet 1/19 OV.  DVT after 2/20 L TKR=off meds as of 9/20 with no sxs.  --  --  MMG 12/4/20 per me.   COLON 2/17...severe tics...per Dr TIM Sethi...? last.  Pneumovax, Prevnar done; Hep A x2; Shingirx rec 7/18 = still not as of 7/19; Covid x 2 as of 2/21 with Moderna " and I discussed with them to keep monitoring the news in regards to the booster.  (, 6/1/19 was 62 yrs, to retire this month 9/14; takes care of great-grandkids as of 7/19 OV; Daughter with Pulm Fibrosis moved in with them recently as of 3/21 OV; she is followed by Dr Avalos; on 8L as of 3/21; this daughter has daughter who is RN going for NP is caring for her--->she passed 8/21).    Follow Up   Return in about 6 months (around 3/30/2023).  Patient was given instructions and counseling regarding her condition or for health maintenance advice. Please see specific information pulled into the AVS if appropriate.

## 2022-09-30 ENCOUNTER — OFFICE VISIT (OUTPATIENT)
Dept: INTERNAL MEDICINE | Facility: CLINIC | Age: 82
End: 2022-09-30

## 2022-09-30 VITALS
HEART RATE: 74 BPM | TEMPERATURE: 97.7 F | HEIGHT: 62 IN | WEIGHT: 167.4 LBS | SYSTOLIC BLOOD PRESSURE: 130 MMHG | DIASTOLIC BLOOD PRESSURE: 80 MMHG | BODY MASS INDEX: 30.8 KG/M2 | OXYGEN SATURATION: 97 %

## 2022-09-30 DIAGNOSIS — E55.9 VITAMIN D DEFICIENCY: ICD-10-CM

## 2022-09-30 DIAGNOSIS — Z23 NEED FOR VACCINATION: ICD-10-CM

## 2022-09-30 DIAGNOSIS — M81.0 AGE-RELATED OSTEOPOROSIS WITHOUT CURRENT PATHOLOGICAL FRACTURE: ICD-10-CM

## 2022-09-30 DIAGNOSIS — Z12.31 BREAST CANCER SCREENING BY MAMMOGRAM: ICD-10-CM

## 2022-09-30 DIAGNOSIS — E78.2 MIXED HYPERLIPIDEMIA: Primary | ICD-10-CM

## 2022-09-30 DIAGNOSIS — R73.01 IMPAIRED FASTING GLUCOSE: ICD-10-CM

## 2022-09-30 DIAGNOSIS — K21.9 GASTRO-ESOPHAGEAL REFLUX DISEASE WITHOUT ESOPHAGITIS: ICD-10-CM

## 2022-09-30 PROCEDURE — G0008 ADMIN INFLUENZA VIRUS VAC: HCPCS | Performed by: INTERNAL MEDICINE

## 2022-09-30 PROCEDURE — 91312 COVID-19 (PFIZER) BIVALENT BOOSTER 12+YRS: CPT | Performed by: INTERNAL MEDICINE

## 2022-09-30 PROCEDURE — 90662 IIV NO PRSV INCREASED AG IM: CPT | Performed by: INTERNAL MEDICINE

## 2022-09-30 PROCEDURE — 0124A COVID-19 (PFIZER) BIVALENT BOOSTER 12+YRS: CPT | Performed by: INTERNAL MEDICINE

## 2022-09-30 PROCEDURE — 99214 OFFICE O/P EST MOD 30 MIN: CPT | Performed by: INTERNAL MEDICINE

## 2022-09-30 NOTE — ASSESSMENT & PLAN NOTE
We reviewed her BMD from 12/21 at her 9/22 office visit.  She had a's much better than expected improvement with just 3 doses of Reclast, certainly no indication for treatment at this time, patient to continue with calcium and vitamin D supplementation as well as keeping physically active.

## 2022-09-30 NOTE — ASSESSMENT & PLAN NOTE
Vitamin D of 62 as of 10/21 is certainly in the good range, and this is just with over-the-counter multivitamin.  Continue same.---> Vitamin D is 69 as of 9/22 office visit, patient will continue with low-dose over-the-counter supplementation.

## 2022-09-30 NOTE — ASSESSMENT & PLAN NOTE
Patient's A1c is 6.2 as of 10/21 office visit.  Patient is stable with conservative dietary restrictions only.---> A1c was 6.4 in 3/22, and currently in 9/22 it is 6.5.  This is still reasonable, no indication for treatment of yet, patient will look at some of the simple sugars in her diet.

## 2022-09-30 NOTE — ASSESSMENT & PLAN NOTE
LDL is down further now to 77 as of her 9/22 office visit.  This is at goal for her, she is being treated for primary prevention, so we will continue with moderate dose pravastatin only.

## 2022-09-30 NOTE — ASSESSMENT & PLAN NOTE
No dysphagia no significant dyspepsia on chronic PPI as of 9/22..  Patient stable to continue same dose of omeprazole.

## 2022-10-05 RX ORDER — BENZONATATE 100 MG/1
100 CAPSULE ORAL 3 TIMES DAILY PRN
Qty: 30 CAPSULE | Refills: 1 | Status: SHIPPED | OUTPATIENT
Start: 2022-10-05 | End: 2022-11-27

## 2022-10-17 RX ORDER — PRAVASTATIN SODIUM 40 MG
TABLET ORAL
Qty: 90 TABLET | Refills: 1 | Status: SHIPPED | OUTPATIENT
Start: 2022-10-17

## 2022-11-11 NOTE — ASSESSMENT & PLAN NOTE
Patient's with Normocytic anemia.. Hemoglobin stable. Etiology likely due to chronic disease .  Current CBC reviewed-    Recent Labs   Lab 11/09/22  1644 11/10/22  0408 11/11/22  0316   HGB 8.6* 8.0* 7.9*    Monitor CBC and transfuse if H/H drops below 7/21.  11/9 concern for blood loss anemia.  Hb at 6.7 Transfusion with 1 unit of PRBC      Stable.  She is on the nexium daily and it helps.  Plan: Continue nexium.

## 2022-12-19 ENCOUNTER — HOSPITAL ENCOUNTER (OUTPATIENT)
Dept: MAMMOGRAPHY | Facility: HOSPITAL | Age: 82
Discharge: HOME | End: 2022-12-19
Admitting: INTERNAL MEDICINE

## 2022-12-19 DIAGNOSIS — Z12.31 BREAST CANCER SCREENING BY MAMMOGRAM: ICD-10-CM

## 2022-12-19 PROCEDURE — 77063 BREAST TOMOSYNTHESIS BI: CPT

## 2022-12-19 PROCEDURE — 77067 SCR MAMMO BI INCL CAD: CPT

## 2023-02-06 RX ORDER — ESCITALOPRAM OXALATE 10 MG/1
TABLET ORAL
Qty: 90 TABLET | Refills: 1 | Status: SHIPPED | OUTPATIENT
Start: 2023-02-06

## 2023-03-20 ENCOUNTER — TELEPHONE (OUTPATIENT)
Dept: INTERNAL MEDICINE | Facility: CLINIC | Age: 83
End: 2023-03-20
Payer: MEDICARE

## 2023-03-20 NOTE — TELEPHONE ENCOUNTER
She should be seen by one of the female providers here first,   because unless it is bothering her significantly, she may not need to be referred at all

## 2023-03-20 NOTE — TELEPHONE ENCOUNTER
Pt states that yesterday while taking a shower she noticed that she has a mass or lump in her vaginal area. She feels that it is either her bladder or her vaginal wall, do you want to refer her to doctor in [place] that we referred the other two patients to for this. I didn't know what you would recommend since she hasn't been seen any where yet for this. Please advise.

## 2023-03-21 ENCOUNTER — OFFICE VISIT (OUTPATIENT)
Dept: INTERNAL MEDICINE | Facility: CLINIC | Age: 83
End: 2023-03-21
Payer: MEDICARE

## 2023-03-21 ENCOUNTER — LAB (OUTPATIENT)
Dept: LAB | Facility: HOSPITAL | Age: 83
End: 2023-03-21
Payer: MEDICARE

## 2023-03-21 VITALS
WEIGHT: 164.2 LBS | RESPIRATION RATE: 18 BRPM | BODY MASS INDEX: 30.22 KG/M2 | SYSTOLIC BLOOD PRESSURE: 127 MMHG | OXYGEN SATURATION: 96 % | HEART RATE: 72 BPM | TEMPERATURE: 97.5 F | HEIGHT: 62 IN | DIASTOLIC BLOOD PRESSURE: 72 MMHG

## 2023-03-21 DIAGNOSIS — N81.4 CYSTOCELE WITH PROLAPSE: ICD-10-CM

## 2023-03-21 DIAGNOSIS — E78.2 MIXED HYPERLIPIDEMIA: ICD-10-CM

## 2023-03-21 DIAGNOSIS — M54.50 ACUTE LOW BACK PAIN, UNSPECIFIED BACK PAIN LATERALITY, UNSPECIFIED WHETHER SCIATICA PRESENT: ICD-10-CM

## 2023-03-21 DIAGNOSIS — R19.8 SUPRAPUBIC FULLNESS: Primary | ICD-10-CM

## 2023-03-21 DIAGNOSIS — R73.01 IMPAIRED FASTING GLUCOSE: ICD-10-CM

## 2023-03-21 LAB
ALBUMIN SERPL-MCNC: 4.1 G/DL (ref 3.5–5.2)
ALBUMIN/GLOB SERPL: 1.4 G/DL
ALP SERPL-CCNC: 69 U/L (ref 39–117)
ALT SERPL W P-5'-P-CCNC: 13 U/L (ref 1–33)
ANION GAP SERPL CALCULATED.3IONS-SCNC: 10.1 MMOL/L (ref 5–15)
AST SERPL-CCNC: 24 U/L (ref 1–32)
BILIRUB BLD-MCNC: NEGATIVE MG/DL
BILIRUB SERPL-MCNC: 0.5 MG/DL (ref 0–1.2)
BUN SERPL-MCNC: 14 MG/DL (ref 8–23)
BUN/CREAT SERPL: 15.6 (ref 7–25)
CALCIUM SPEC-SCNC: 10.3 MG/DL (ref 8.6–10.5)
CHLORIDE SERPL-SCNC: 104 MMOL/L (ref 98–107)
CHOLEST SERPL-MCNC: 169 MG/DL (ref 0–200)
CLARITY, POC: CLEAR
CO2 SERPL-SCNC: 27.9 MMOL/L (ref 22–29)
COLOR UR: YELLOW
CREAT SERPL-MCNC: 0.9 MG/DL (ref 0.57–1)
EGFRCR SERPLBLD CKD-EPI 2021: 64.8 ML/MIN/1.73
EXPIRATION DATE: NORMAL
GLOBULIN UR ELPH-MCNC: 3 GM/DL
GLUCOSE SERPL-MCNC: 98 MG/DL (ref 65–99)
GLUCOSE UR STRIP-MCNC: NEGATIVE MG/DL
HBA1C MFR BLD: 6.6 % (ref 4.8–5.6)
HDLC SERPL-MCNC: 39 MG/DL (ref 40–60)
KETONES UR QL: NEGATIVE
LDLC SERPL CALC-MCNC: 103 MG/DL (ref 0–100)
LDLC/HDLC SERPL: 2.56 {RATIO}
LEUKOCYTE EST, POC: NEGATIVE
Lab: NORMAL
NITRITE UR-MCNC: NEGATIVE MG/ML
PH UR: 6 [PH] (ref 5–8)
POTASSIUM SERPL-SCNC: 4.2 MMOL/L (ref 3.5–5.2)
PROT SERPL-MCNC: 7.1 G/DL (ref 6–8.5)
PROT UR STRIP-MCNC: NEGATIVE MG/DL
RBC # UR STRIP: NEGATIVE /UL
SODIUM SERPL-SCNC: 142 MMOL/L (ref 136–145)
SP GR UR: 1.01 (ref 1–1.03)
TRIGL SERPL-MCNC: 150 MG/DL (ref 0–150)
UROBILINOGEN UR QL: NORMAL
VLDLC SERPL-MCNC: 27 MG/DL (ref 5–40)

## 2023-03-21 PROCEDURE — 1160F RVW MEDS BY RX/DR IN RCRD: CPT

## 2023-03-21 PROCEDURE — 83036 HEMOGLOBIN GLYCOSYLATED A1C: CPT

## 2023-03-21 PROCEDURE — 36415 COLL VENOUS BLD VENIPUNCTURE: CPT

## 2023-03-21 PROCEDURE — 99214 OFFICE O/P EST MOD 30 MIN: CPT

## 2023-03-21 PROCEDURE — 80061 LIPID PANEL: CPT

## 2023-03-21 PROCEDURE — 80053 COMPREHEN METABOLIC PANEL: CPT

## 2023-03-21 PROCEDURE — 1159F MED LIST DOCD IN RCRD: CPT

## 2023-03-21 PROCEDURE — 81003 URINALYSIS AUTO W/O SCOPE: CPT

## 2023-03-21 NOTE — PROGRESS NOTES
Chief Complaint  Cystitis (Pt states that she feels like her bladder is falling out. She has constant urination, lower back pain. She states that her abdomin feels weird. She noticed this Sunday morning. )    History of Present Illness  SUBJECTIVE  Laura Cardenas presents to [facility] with complaints of possible fallen bladder. Patient states that when she was taking a shower and felt something down there on Sunday.  She complains of suprapubic fullness, lower back pain, urinary frequency, dysuria (intermittent for a few weeks).   Denies fever, chills, nausea, vomiting, or diarrhea.  She states she has a hx of frequent UTIs    Past Medical History:   Diagnosis Date   • Acute embolism and thrombosis of deep vein of left lower extremity (HCC)    • Age-related osteoporosis without current pathological fracture    • Disorder of bone, unspecified    • Diverticulosis    • DJD (degenerative joint disease)    • GERD without esophagitis    • History of transfusion     61 years ago for childbirth   • Mixed hyperlipidemia    • Primary generalized (osteo)arthritis    • Spinal headache       Family History   Problem Relation Age of Onset   • Cancer Father    • Cancer Brother    • Malig Hyperthermia Neg Hx       Past Surgical History:   Procedure Laterality Date   • BACK SURGERY  1990   • BACK SURGERY      S/P Lumbar   • BREAST BIOPSY      Breast Bx (81 & 98)   • CATARACT EXTRACTION      12/11/2015 L Cataract 12/04/2015 R Cataract   • COLONOSCOPY  2017    DR. AWAD   • COLONOSCOPY N/A 12/1/2021    Procedure: COLONOSCOPY, WITH HOT SNARE POLYPECTOMY,;  Surgeon: Tony Thomas MD;  Location: [facility] ENDOSCOPY;  Service: Gastroenterology;  Laterality: N/A;  DIVERTICULOSIS, COLON POLYP   • REPLACEMENT TOTAL KNEE Bilateral     04/10/2019 right knee replacement 01/2020 knee replacement left   • TONSILLECTOMY      10 YEARS OLD        Current Outpatient Medications:   •   "amoxicillin-clavulanate (AUGMENTIN) 875-125 MG per tablet, Take 1 tablet by mouth 2 (Two) Times a Day. (Patient not taking: Reported on 3/21/2023), Disp: 20 tablet, Rfl: 0  •  Calcium Carbonate 1500 (600 Ca) MG tablet, Take 600 mg by mouth 2 (Two) Times a Day With Meals., Disp: , Rfl:   •  escitalopram (LEXAPRO) 10 MG tablet, TAKE ONE TABLET BY MOUTH DAILY, Disp: 90 tablet, Rfl: 1  •  esomeprazole (NexIUM) 20 MG packet, Take 20 mg by mouth Every Morning Before Breakfast., Disp: , Rfl:   •  guaifenesin-dextromethorphan (MUCINEX DM)  MG tablet sustained-release 12 hour tablet, Take 1 tablet by mouth 2 (Two) Times a Day As Needed (cough)., Disp: 20 tablet, Rfl: 0  •  multivitamin (THERAGRAN) tablet tablet, Take 1 tablet by mouth Every Night., Disp: , Rfl:   •  pravastatin (PRAVACHOL) 40 MG tablet, TAKE ONE TABLET BY MOUTH ONCE NIGHTLY, Disp: 90 tablet, Rfl: 1    OBJECTIVE  Vital Signs:   /72 (BP Location: Left arm)   Pulse 72   Temp 97.5 °F (36.4 °C) (Temporal)   Resp 18   Ht 157.5 cm (62.01\")   Wt 74.5 kg (164 lb 3.2 oz)   SpO2 96%   BMI 30.02 kg/m²    Estimated body mass index is 30.02 kg/m² as calculated from the following:    Height as of this encounter: 157.5 cm (62.01\").    Weight as of this encounter: 74.5 kg (164 lb 3.2 oz).     Wt Readings from Last 3 Encounters:   03/21/23 74.5 kg (164 lb 3.2 oz)   09/30/22 75.9 kg (167 lb 6.4 oz)   09/14/22 74.4 kg (164 lb)     BP Readings from Last 3 Encounters:   03/21/23 127/72   11/27/22 99/69   09/30/22 130/80       Physical Exam  Vitals and nursing note reviewed. Exam conducted with a chaperone present.   Constitutional:       Appearance: Normal appearance.   HENT:      Head: Normocephalic.   Eyes:      Extraocular Movements: Extraocular movements intact.      Conjunctiva/sclera: Conjunctivae normal.   Cardiovascular:      Rate and Rhythm: Normal rate.   Pulmonary:      Effort: Pulmonary effort is normal.      Breath sounds: Normal breath sounds. No " wheezing or rales.   Abdominal:      General: Bowel sounds are normal.      Palpations: Abdomen is soft.      Tenderness: There is abdominal tenderness in the suprapubic area. There is no guarding.   Genitourinary:     Exam position: Lithotomy position.      Urethra: Prolapse and urethral pain present.   Musculoskeletal:         General: No swelling. Normal range of motion.   Skin:     General: Skin is warm and dry.   Neurological:      General: No focal deficit present.      Mental Status: She is alert. Mental status is at baseline.   Psychiatric:         Mood and Affect: Mood normal.         Thought Content: Thought content normal.          Result Review        XR Chest 2 View    Result Date: 11/27/2022   No active cardiopulmonary disease.  Old T10 compression.     TAD BARTON DO       Electronically Signed and Approved By: TAD BARTON DO on 11/27/2022 at 16:17             Mammo Screening Digital Tomosynthesis Bilateral With CAD    Result Date: 12/20/2022   Benign mammogram. Suggest routine mammographic screening.  RECOMMENDATION(S):  ROUTINE MAMMOGRAM AND CLINICAL EVALUATION IN 12 MONTHS.   BIRADS:  DIAGNOSTIC CATEGORY 2--BENIGN FINDING   BREAST COMPOSITION: Scattered areas fibroglandular density.  PLEASE NOTE:  A NORMAL MAMMOGRAM DOES NOT EXCLUDE THE POSSIBILITY OF BREAST CANCER. ANY CLINICALLY SUSPICIOUS PALPABLE LUMP SHOULD BE BIOPSIED.      TAD BARTON DO       Electronically Signed and Approved By: TAD BARTON DO on 12/20/2022 at 8:21                The above data has been reviewed by JOSE Guerrier 03/21/2023 13:34 EDT.          Patient Care Team:  Ari Brady MD as PCP - General (Internal Medicine)        ASSESSMENT & PLAN    Diagnoses and all orders for this visit:    1. Suprapubic fullness (Primary)  Assessment & Plan:  Patient presents to the office today with complaints of possible prolapsed bladder.  Patient states that she was taking a shower on Sunday and she noticed something down  at her vaginal area.  She states that she also has suprapubic fullness/tenderness, lower back pain urinary frequency and intermittent dysuria.  The dysuria has been present intermittently for a few weeks.  She states she does have a history of frequent urinary tract infections.  She denies any fever, chills, nausea, vomiting, diarrhea.  She denies any vaginal bleeding or discharge.  It appears she could have a cystocele upon examination.  She does have quite a bit of fullness.  We will go ahead and get a pelvic ultrasound and referral to urogynecology.  Patient is agreeable with plan.  Urinalysis negative today in the office. Send off for culture.  Recommend keeping the area clean, wear cotton underwear. Monitor and  Report any worsening symptoms.    Orders:  -     Ambulatory Referral to Gynecologic Urology  -     US Pelvis Complete; Future    2. Cystocele with prolapse  -     Ambulatory Referral to Gynecologic Urology  -     US Pelvis Complete; Future    3. Acute low back pain, unspecified back pain laterality, unspecified whether sciatica present  -     POCT urinalysis dipstick, automated       Tobacco Use: Medium Risk   • Smoking Tobacco Use: Former   • Smokeless Tobacco Use: Never   • Passive Exposure: Not on file       Follow Up     No follow-ups on file.    Please note that portions of this note were completed with a voice recognition program.    Patient was given instructions and counseling regarding her condition or for health maintenance advice. Please see specific information pulled into the AVS if appropriate.   I have reviewed information obtained and documented by others and I have confirmed the accuracy of this documented note.    JOSE Guerrier

## 2023-03-22 NOTE — ASSESSMENT & PLAN NOTE
Patient presents to the office today with complaints of possible prolapsed bladder.  Patient states that she was taking a shower on Sunday and she noticed something down at her vaginal area.  She states that she also has suprapubic fullness/tenderness, lower back pain urinary frequency and intermittent dysuria.  The dysuria has been present intermittently for a few weeks.  She states she does have a history of frequent urinary tract infections.  She denies any fever, chills, nausea, vomiting, diarrhea.  She denies any vaginal bleeding or discharge.  It appears she could have a cystocele upon examination.  She does have quite a bit of fullness.  We will go ahead and get a pelvic ultrasound and referral to urogynecology.  Patient is agreeable with plan.  Urinalysis negative today in the office. Send off for culture.  Recommend keeping the area clean, wear cotton underwear. Monitor and  Report any worsening symptoms.

## 2023-03-26 PROBLEM — M85.852 OSTEOPENIA OF NECKS OF BOTH FEMURS: Status: RESOLVED | Noted: 2022-03-28 | Resolved: 2023-03-26

## 2023-03-26 PROBLEM — D50.9 IRON DEFICIENCY ANEMIA: Status: RESOLVED | Noted: 2021-10-03 | Resolved: 2023-03-26

## 2023-03-26 PROBLEM — Z00.00 MEDICARE ANNUAL WELLNESS VISIT, SUBSEQUENT: Status: ACTIVE | Noted: 2023-03-26

## 2023-03-26 PROBLEM — K62.5 RECTAL BLEEDING: Status: RESOLVED | Noted: 2021-10-03 | Resolved: 2023-03-26

## 2023-03-26 PROBLEM — M85.851 OSTEOPENIA OF NECKS OF BOTH FEMURS: Status: RESOLVED | Noted: 2022-03-28 | Resolved: 2023-03-26

## 2023-03-26 NOTE — PROGRESS NOTES
"Chief Complaint  Hyperlipidemia, Follow-up (Pt states that this is routine, she had labs. ), Sinusitis (Pt states that she has multiple sinus infections and she has a cough. She states that now that they have earring aides the cough is louder!), and Leaky Bladder (Pt saw Yvonne last week and she stated that she was going to send in something for leaky bladder if she didn't have an infection, I don't see this in the chart, can you send in something. )    Subjective          Laura Cardenas presents to [facility]     History of Present Illness  82-year-old female with underlying hyperlipidemia, IFG, reflux, who is coming in 3/23 for 6-12-month follow-up.  We will go over her med list in detail, review her labs, and make further recommendations at that time.    Review of Systems   Constitutional: Negative for appetite change, fatigue and fever.   HENT: Negative for congestion and ear pain.    Eyes: Negative for blurred vision.   Respiratory: Negative for cough, chest tightness, shortness of breath and wheezing.    Cardiovascular: Negative for chest pain, palpitations and leg swelling.   Gastrointestinal: Negative for abdominal pain.   Genitourinary: Negative for difficulty urinating, dysuria and hematuria.   Musculoskeletal: Negative for arthralgias and gait problem.   Skin: Negative for skin lesions.   Neurological: Negative for syncope, memory problem and confusion.   Psychiatric/Behavioral: Negative for self-injury and depressed mood.       Objective   Vital Signs:   /80   Pulse 74   Temp 98.4 °F (36.9 °C) (Skin)   Ht 157.5 cm (62.01\")   Wt 74.5 kg (164 lb 3.2 oz)   SpO2 98%   BMI 30.02 kg/m²           Physical Exam  Vitals and nursing note reviewed.   Constitutional:       General: She is not in acute distress.     Appearance: Normal appearance. She is not toxic-appearing.   HENT:      Head: Atraumatic.      Right Ear: External ear normal.      Left Ear: External ear " normal.      Nose: Nose normal.      Mouth/Throat:      Mouth: Mucous membranes are moist.   Eyes:      General:         Right eye: No discharge.         Left eye: No discharge.      Extraocular Movements: Extraocular movements intact.      Pupils: Pupils are equal, round, and reactive to light.   Cardiovascular:      Rate and Rhythm: Normal rate and regular rhythm.      Pulses: Normal pulses.      Heart sounds: Normal heart sounds. No murmur heard.    No gallop.   Pulmonary:      Effort: Pulmonary effort is normal. No respiratory distress.      Breath sounds: No wheezing, rhonchi or rales.   Abdominal:      General: There is no distension.      Palpations: Abdomen is soft. There is no mass.      Tenderness: There is no abdominal tenderness. There is no guarding.   Musculoskeletal:         General: No swelling or tenderness.      Cervical back: No tenderness.      Right lower leg: No edema.      Left lower leg: No edema.   Skin:     General: Skin is warm and dry.      Findings: No rash.   Neurological:      General: No focal deficit present.      Mental Status: She is alert and oriented to person, place, and time. Mental status is at baseline.      Motor: No weakness.      Gait: Gait normal.   Psychiatric:         Mood and Affect: Mood normal.         Thought Content: Thought content normal.          Result Review :   The following data was reviewed by: Ari Brady MD on 10/06/2021:                  Assessment and Plan    Diagnoses and all orders for this visit:    1. Medicare annual wellness visit, subsequent (Primary)  Assessment & Plan:  AWV completed 3/23.  Patient remains independent, no falls.  Discussed with benefit of living will.      2. Impaired fasting glucose  Assessment & Plan:  A1c is fairly stable at 6.6 as of 3/23.  Her fasting sugar was only 98.  She will continue to keep an eye on the carbohydrates, otherwise no medication indicated.    Orders:  -     Hemoglobin A1c; Future    3. Mixed  hyperlipidemia  Assessment & Plan:  LDL is up from 77 to 103 as of 3/23, but the previous average was somewhere right around 100.  I think she is fine to continue with moderate dose pravastatin, if trends up another 20 points on return to office, then would recommend adjusting medication.    Orders:  -     Comprehensive Metabolic Panel; Future  -     Lipid Panel; Future    4. Primary osteoarthritis involving multiple joints  Overview:  Patient stable with over-the-counter Tylenol as needed for generalized aches and pains. (ask about R ankle on RTO = lesion to be evaluated in [place].)      5. Vitamin D deficiency  -     Vitamin D,25-Hydroxy; Future    6. Well adult exam  -     TSH; Future  -     CBC & Differential; Future    7. Mixed stress and urge urinary incontinence  Assessment & Plan:  This is more of an issue as of her 3/23 office visit.  She saw Yvonne, ultrasound is pending.  There was no evidence of a UTI, so we will try low-dose Myrbetriq over Ditropan/Detrol secondary to her chronic dry eyes.      8. Non-seasonal allergic rhinitis due to pollen  Assessment & Plan:  This is more of an issue as of 3/23.  Patient has severe dry eyes, she is avoiding antihistamines.  We will give Singulair a try, patient to call if effective.      Other orders  -     Discontinue: oxybutynin XL (DITROPAN-XL) 5 MG 24 hr tablet; Take 1 tablet by mouth Daily.  Dispense: 30 tablet; Refill: 1  -     Mirabegron ER (Myrbetriq) 25 MG tablet sustained-release 24 hour 24 hr tablet; Take 1 tablet by mouth Daily.  Dispense: 30 tablet; Refill: 1  -     montelukast (Singulair) 10 MG tablet; Take 1 tablet by mouth Every Night.  Dispense: 30 tablet; Refill: 1     --  --  OLDER NOTES:  VISIT 3/21:  ANNUAL MEDICARE WELLNESS EXAM 9/20 = reviewed all forms in office with pt; no new discoveries.  OVERWEIGHT and d/w plan of care=lower diet by 500 jim and 10,000 steps/day.  --  ANXIETY D/O per HPI 4/17 = gasps for breath periodically, ever since  "had tubes tied b/c relative told her it would do that; lost sister, daughter with pulm fibrosis, grand-daughter with blood clot and pregnant,  had MI few months ago, so will start SSRI...better 7/17 = no c/o.  --  PALPITATIONS = x3, at rest, but not very active though, very brief, but will add low dose beta blocker on RTO if same c/o.  \"HTN\" = white-coat or whathaveyou b/c is very normal elsewhere...says it's fine at home...wnl in office again today---> fine at home.  --  IFG is stable w/o meds=5.8 (1/18)...6.4---> 6.2 holding in 3/21.  LIPIDS  and rec tx (3/15)... down 40 to 130 and rec 20 mg dose now...LDL 90 is holding...94... LDL 76 and TG's neg, so does not need fish oil...83 in 9/20---> 95 in 3/21 is ok.  --  ESSENTIAL TREMOR of head...neg si/sx parkinsons...d/w tx if she desires.  --  GERD w/o dysphagia on PPI qd and occ tums for breakthrough.  ZOSTER/RASH per HPI=flat vesicular, grouped and follows pattern of L4, so will treat as ZOSTER despite itch; call if no help...drying up, but pain now, so neurontin...no c/o...rec Shingrix 7/18.  --  VIT D DEF remains stable=45 (7/18)...44---> 51 in 9/20.  OSTEOPENIA...BMD 8/13 = spine -1.4, hip -1.5...BMD 4/17 = spine -1.4, hip -2.2 and I rec tx now with reclast 7/17...she tolerated this...BMD 9/19 = spine -0.9, hip -1.7....Reclast x 3 as of 3/20---> will repeat this fall '21.  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 Uses aleve rarely...daily now, so rec try to work tylenol in there instead...on bid aleve and I rec try to use tylenol instead once again at 1/18 OV... on tylenol and wants to add voltaren gel 1/19 OV.  RLS and will try sinemet 1/19 OV.  DVT after 2/20 L TKR=off meds as of 9/20 with no sxs.  --  --  MMG 12/4/20 per me.   COLON 2/17...severe tics...per Dr TIM Sethi...? last.  Pneumovax, Prevnar done; Hep A x2; Shingirx rec 7/18 = still not as of 7/19; Covid x 2 as of 2/21 with Moderna and I " discussed with them to keep monitoring the news in regards to the booster.  (, 6/1/19 was 62 yrs, to retire this month 9/14; takes care of great-grandkids as of 7/19 OV; Daughter with Pulm Fibrosis moved in with them recently as of 3/21 OV; she is followed by Dr Avalos; on 8L as of 3/21; this daughter has daughter who is RN going for NP is caring for her--->she passed 8/21).    Follow Up   Return in about 6 months (around 9/28/2023).  Patient was given instructions and counseling regarding her condition or for health maintenance advice. Please see specific information pulled into the AVS if appropriate.

## 2023-03-28 ENCOUNTER — HOSPITAL ENCOUNTER (OUTPATIENT)
Dept: ULTRASOUND IMAGING | Facility: HOSPITAL | Age: 83
Discharge: HOME | End: 2023-03-28
Payer: MEDICARE

## 2023-03-28 ENCOUNTER — OFFICE VISIT (OUTPATIENT)
Dept: INTERNAL MEDICINE | Facility: CLINIC | Age: 83
End: 2023-03-28
Payer: MEDICARE

## 2023-03-28 VITALS
TEMPERATURE: 98.4 F | WEIGHT: 164.2 LBS | SYSTOLIC BLOOD PRESSURE: 140 MMHG | DIASTOLIC BLOOD PRESSURE: 80 MMHG | HEIGHT: 62 IN | BODY MASS INDEX: 30.22 KG/M2 | OXYGEN SATURATION: 98 % | HEART RATE: 74 BPM

## 2023-03-28 DIAGNOSIS — N81.4 CYSTOCELE WITH PROLAPSE: ICD-10-CM

## 2023-03-28 DIAGNOSIS — Z00.00 WELL ADULT EXAM: ICD-10-CM

## 2023-03-28 DIAGNOSIS — E55.9 VITAMIN D DEFICIENCY: ICD-10-CM

## 2023-03-28 DIAGNOSIS — R73.01 IMPAIRED FASTING GLUCOSE: ICD-10-CM

## 2023-03-28 DIAGNOSIS — Z00.00 MEDICARE ANNUAL WELLNESS VISIT, SUBSEQUENT: Primary | ICD-10-CM

## 2023-03-28 DIAGNOSIS — J30.1 NON-SEASONAL ALLERGIC RHINITIS DUE TO POLLEN: ICD-10-CM

## 2023-03-28 DIAGNOSIS — R19.8 SUPRAPUBIC FULLNESS: ICD-10-CM

## 2023-03-28 DIAGNOSIS — M15.9 PRIMARY OSTEOARTHRITIS INVOLVING MULTIPLE JOINTS: ICD-10-CM

## 2023-03-28 DIAGNOSIS — E78.2 MIXED HYPERLIPIDEMIA: ICD-10-CM

## 2023-03-28 DIAGNOSIS — N39.46 MIXED STRESS AND URGE URINARY INCONTINENCE: ICD-10-CM

## 2023-03-28 PROCEDURE — 76857 US EXAM PELVIC LIMITED: CPT

## 2023-03-28 RX ORDER — OXYBUTYNIN CHLORIDE 5 MG/1
5 TABLET, EXTENDED RELEASE ORAL DAILY
Qty: 30 TABLET | Refills: 1 | Status: SHIPPED | OUTPATIENT
Start: 2023-03-28 | End: 2023-03-28

## 2023-03-28 RX ORDER — MONTELUKAST SODIUM 10 MG/1
10 TABLET ORAL NIGHTLY
Qty: 30 TABLET | Refills: 1 | Status: SHIPPED | OUTPATIENT
Start: 2023-03-28

## 2023-03-28 NOTE — ASSESSMENT & PLAN NOTE
This is more of an issue as of 3/23.  Patient has severe dry eyes, she is avoiding antihistamines.  We will give Singulair a try, patient to call if effective.

## 2023-03-28 NOTE — ASSESSMENT & PLAN NOTE
LDL is up from 77 to 103 as of 3/23, but the previous average was somewhere right around 100.  I think she is fine to continue with moderate dose pravastatin, if trends up another 20 points on return to office, then would recommend adjusting medication.

## 2023-03-28 NOTE — PROGRESS NOTES
The ABCs of the Annual Wellness Visit  Subsequent Medicare Wellness Visit    Subjective      Laura Cardenas is a 82 y.o. female who presents for a Subsequent Medicare Wellness Visit.    The following portions of the patient's history were reviewed and   updated as appropriate: allergies, current medications, past family history, past medical history, past social history, past surgical history and problem list.    Compared to one year ago, the patient feels her physical   health is the same.    Compared to one year ago, the patient feels her mental   health is the same.    Recent Hospitalizations:  She was not admitted to the hospital during the last year.       Current Medical Providers:  Patient Care Team:  Ari Brady MD as PCP - General (Internal Medicine)    Outpatient Medications Prior to Visit   Medication Sig Dispense Refill   • Calcium Carbonate 1500 (600 Ca) MG tablet Take 1 tablet by mouth 2 (Two) Times a Day With Meals.     • escitalopram (LEXAPRO) 10 MG tablet TAKE ONE TABLET BY MOUTH DAILY 90 tablet 1   • esomeprazole (NexIUM) 20 MG packet Take 20 mg by mouth Every Morning Before Breakfast.     • guaifenesin-dextromethorphan (MUCINEX DM)  MG tablet sustained-release 12 hour tablet Take 1 tablet by mouth 2 (Two) Times a Day As Needed (cough). 20 tablet 0   • multivitamin (THERAGRAN) tablet tablet Take 1 tablet by mouth Every Night.     • pravastatin (PRAVACHOL) 40 MG tablet TAKE ONE TABLET BY MOUTH ONCE NIGHTLY 90 tablet 1   • amoxicillin-clavulanate (AUGMENTIN) 875-125 MG per tablet Take 1 tablet by mouth 2 (Two) Times a Day. (Patient not taking: Reported on 3/21/2023) 20 tablet 0     No facility-administered medications prior to visit.       No opioid medication identified on active medication list. I have reviewed chart for other potential  high risk medication/s and harmful drug interactions in the elderly.          Aspirin is not on active medication list.  Aspirin use is not indicated  "based on review of current medical condition/s. Risk of harm outweighs potential benefits.  .    Patient Active Problem List   Diagnosis   • Gastro-esophageal reflux disease without esophagitis   • Mixed hyperlipidemia   • Impaired fasting glucose   • Venous thromboembolism (VTE)   • Vitamin D deficiency   • Primary osteoarthritis involving multiple joints   • Age-related osteoporosis without current pathological fracture   • Positive colorectal cancer screening using Cologuard test   • Suprapubic fullness   • Medicare annual wellness visit, subsequent     Advance Care Planning  Advance Directive is not on file.  ACP discussion was held with the patient during this visit. Patient has an advance directive (not in EMR), copy requested. Patient does not have an advance directive, information provided.     Objective    Vitals:    23 1006   BP: 140/80   Pulse: 74   Temp: 98.4 °F (36.9 °C)   TempSrc: Skin   SpO2: 98%   Weight: 74.5 kg (164 lb 3.2 oz)   Height: 157.5 cm (62.01\")     Estimated body mass index is 30.02 kg/m² as calculated from the following:    Height as of this encounter: 157.5 cm (62.01\").    Weight as of this encounter: 74.5 kg (164 lb 3.2 oz).    BMI is >= 30 and <35. (Class 1 Obesity). The following options were offered after discussion;: nutrition counseling/recommendations      Does the patient have evidence of cognitive impairment?   No    Lab Results   Component Value Date    TRIG 150 2023    HDL 39 (L) 2023     (H) 2023    VLDL 27 2023    HGBA1C 6.60 (H) 2023          HEALTH RISK ASSESSMENT    Smoking Status:  Social History     Tobacco Use   Smoking Status Former   • Packs/day: 0.50   • Years: 39.00   • Pack years: 19.50   • Types: Cigarettes   • Quit date: 1996   • Years since quittin.5   Smokeless Tobacco Never     Alcohol Consumption:  Social History     Substance and Sexual Activity   Alcohol Use Never     Fall Risk Screen:    STEADI Fall " Risk Assessment was completed, and patient is at LOW risk for falls.Assessment completed on:3/28/2023    Depression Screening:  PHQ-2/PHQ-9 Depression Screening 3/28/2023   Little Interest or Pleasure in Doing Things 0-->not at all   Feeling Down, Depressed or Hopeless 0-->not at all   PHQ-9: Brief Depression Severity Measure Score 0       Health Habits and Functional and Cognitive Screening:  Functional & Cognitive Status 3/28/2023   Do you have difficulty preparing food and eating? No   Do you have difficulty bathing yourself, getting dressed or grooming yourself? No   Do you have difficulty using the toilet? No   Do you have difficulty moving around from place to place? No   Do you have trouble with steps or getting out of a bed or a chair? No   Current Diet Well Balanced Diet   Do you need help using the phone?  No   Are you deaf or do you have serious difficulty hearing?  Yes   Do you need help with transportation? No   Do you need help shopping? No   Do you need help preparing meals?  No   Do you need help with housework?  No   Do you need help with laundry? No   Do you need help taking your medications? No   Do you need help managing money? No   Do you ever drive or ride in a car without wearing a seat belt? No   Do you have difficulty concentrating, remembering or making decisions? No       Age-appropriate Screening Schedule:  Refer to the list below for future screening recommendations based on patient's age, sex and/or medical conditions. Orders for these recommended tests are listed in the plan section. The patient has been provided with a written plan.    Health Maintenance   Topic Date Due   • TDAP/TD VACCINES (1 - Tdap) Never done   • ZOSTER VACCINE (1 of 2) Never done   • ANNUAL WELLNESS VISIT  09/08/2021   • DXA SCAN  12/16/2023   • LIPID PANEL  03/21/2024   • COLORECTAL CANCER SCREENING  12/01/2024   • COVID-19 Vaccine  Completed   • INFLUENZA VACCINE  Completed   • Pneumococcal Vaccine 65+   Completed                CMS Preventative Services Quick Reference  Risk Factors Identified During Encounter:    None Identified    The above risks/problems have been discussed with the patient.  Pertinent information has been shared with the patient in the After Visit Summary.    Diagnoses and all orders for this visit:    1. Medicare annual wellness visit, subsequent (Primary)  Assessment & Plan:  AWV completed 3/23.  Patient remains independent, no falls.  Discussed with benefit of living will.      2. Impaired fasting glucose  Assessment & Plan:  A1c is fairly stable at 6.6 as of 3/23.  Her fasting sugar was only 98.  She will continue to keep an eye on the carbohydrates, otherwise no medication indicated.    Orders:  -     Hemoglobin A1c; Future    3. Mixed hyperlipidemia  Assessment & Plan:  LDL is up from 77 to 103 as of 3/23, but the previous average was somewhere right around 100.  I think she is fine to continue with moderate dose pravastatin, if trends up another 20 points on return to office, then would recommend adjusting medication.    Orders:  -     Comprehensive Metabolic Panel; Future  -     Lipid Panel; Future    4. Primary osteoarthritis involving multiple joints    5. Vitamin D deficiency  -     Vitamin D,25-Hydroxy; Future    6. Well adult exam  -     TSH; Future  -     CBC & Differential; Future      Follow Up:   Next Medicare Wellness visit to be scheduled in 1 year.      An After Visit Summary and PPPS were made available to the patient.

## 2023-03-28 NOTE — ASSESSMENT & PLAN NOTE
This is more of an issue as of her 3/23 office visit.  She saw Yvonne, ultrasound is pending.  There was no evidence of a UTI, so we will try low-dose Myrbetriq over Ditropan/Detrol secondary to her chronic dry eyes.

## 2023-03-28 NOTE — ASSESSMENT & PLAN NOTE
AWV completed 3/23.  Patient remains independent, no falls.  Discussed with benefit of living will.

## 2023-03-28 NOTE — ASSESSMENT & PLAN NOTE
A1c is fairly stable at 6.6 as of 3/23.  Her fasting sugar was only 98.  She will continue to keep an eye on the carbohydrates, otherwise no medication indicated.

## 2023-04-11 ENCOUNTER — HOSPITAL ENCOUNTER (OUTPATIENT)
Dept: CT IMAGING | Facility: HOSPITAL | Age: 83
Discharge: HOME | End: 2023-04-11
Payer: MEDICARE

## 2023-04-11 DIAGNOSIS — R19.8 SUPRAPUBIC FULLNESS: ICD-10-CM

## 2023-04-11 DIAGNOSIS — N81.4 CYSTOCELE WITH PROLAPSE: ICD-10-CM

## 2023-04-11 PROCEDURE — 74176 CT ABD & PELVIS W/O CONTRAST: CPT

## 2023-04-14 ENCOUNTER — TELEPHONE (OUTPATIENT)
Dept: INTERNAL MEDICINE | Facility: CLINIC | Age: 83
End: 2023-04-14
Payer: MEDICARE

## 2023-04-14 NOTE — TELEPHONE ENCOUNTER
Caller: Laura Cardenas    Relationship to patient: Self    Best call back number: [phone]    Patient is needing: PATIENT CALLED STATING SHE NEEDS A CALL BACK EARLY THIS MORNING DUE TO SHE HAS TO SEND PAPERS OUT FOR ANOTHER DOCTOR TODAY.     PATIENT STATES SHE WAS SCHEDULED WITH DR THIBODEAUX AND SHE IS NOT SURE IF SHE STILL NEEDS THIS APPOINTMENT OR NOT AND NEEDS MARI TO CALL HER URGENTLY.

## 2023-04-14 NOTE — TELEPHONE ENCOUNTER
Caller: Laura Cardenas    Relationship: Self    Best call back number: [phone]    What is the best time to reach you: ANY      Do you know the name of the person who called: MARI    What was the call regarding: RETURNING CALL TO MARI -  PATIENT WANTED TO LET HER KNOW THAT DR FLAKO THIBODEAUX'S  APPT - IS 04/18/2024.. NOT THIS YEAR    Do you require a callback: NO, ONLY IF NEEDED

## 2023-04-18 RX ORDER — OXYBUTYNIN CHLORIDE 10 MG/1
10 TABLET, EXTENDED RELEASE ORAL DAILY
Qty: 90 TABLET | Refills: 1 | Status: SHIPPED | OUTPATIENT
Start: 2023-04-18

## 2023-04-18 NOTE — TELEPHONE ENCOUNTER
Pt states that it hasn't changed, she just feels something there that isn't normally there. She states that she only has occasional discomfort in her abdomen. She also has a leaky bladder. She was started was Oxybutynin 5mg qd, but she is wanting to know if this can be increased.

## 2023-04-18 NOTE — TELEPHONE ENCOUNTER
Do you all want me to try to find her someone else to see? She is concerned about the appointment being so far out.

## 2023-04-18 NOTE — TELEPHONE ENCOUNTER
Is she still having symptoms??? If so, what are they? And we can possibly just get her in to urology

## 2023-04-19 NOTE — TELEPHONE ENCOUNTER
I have let pt know of this. She voiced understanding.   I am going to call Dr. Gallagher's office to try to get her in.

## 2023-04-27 NOTE — TELEPHONE ENCOUNTER
I have called Dr. Gallagher's office they don't treat this. I have called pt and asked her to call Dr. Martel's office tomorrow to see if they have had any cancellations and to ask if they have a cancellation list. She voiced understanding. She states that the increase of Oxybutynin to 10mg qd has helped some.

## 2023-05-03 RX ORDER — PRAVASTATIN SODIUM 40 MG
TABLET ORAL
Qty: 90 TABLET | Refills: 1 | Status: SHIPPED | OUTPATIENT
Start: 2023-05-03

## 2023-08-16 RX ORDER — ESCITALOPRAM OXALATE 10 MG/1
TABLET ORAL
Qty: 90 TABLET | Refills: 1 | Status: SHIPPED | OUTPATIENT
Start: 2023-08-16

## 2023-09-21 ENCOUNTER — LAB (OUTPATIENT)
Dept: LAB | Facility: HOSPITAL | Age: 83
End: 2023-09-21

## 2023-09-21 DIAGNOSIS — E55.9 VITAMIN D DEFICIENCY: ICD-10-CM

## 2023-09-21 DIAGNOSIS — R73.01 IMPAIRED FASTING GLUCOSE: ICD-10-CM

## 2023-09-21 DIAGNOSIS — E78.2 MIXED HYPERLIPIDEMIA: ICD-10-CM

## 2023-09-21 DIAGNOSIS — Z00.00 WELL ADULT EXAM: ICD-10-CM

## 2023-09-21 LAB
25(OH)D3 SERPL-MCNC: 66.4 NG/ML (ref 30–100)
ALBUMIN SERPL-MCNC: 4 G/DL (ref 3.5–5.2)
ALBUMIN/GLOB SERPL: 1.5 G/DL
ALP SERPL-CCNC: 63 U/L (ref 39–117)
ALT SERPL W P-5'-P-CCNC: 13 U/L (ref 1–33)
ANION GAP SERPL CALCULATED.3IONS-SCNC: 11.4 MMOL/L (ref 5–15)
AST SERPL-CCNC: 23 U/L (ref 1–32)
BASOPHILS # BLD AUTO: 0.03 10*3/MM3 (ref 0–0.2)
BASOPHILS NFR BLD AUTO: 0.5 % (ref 0–1.5)
BILIRUB SERPL-MCNC: 0.4 MG/DL (ref 0–1.2)
BUN SERPL-MCNC: 15 MG/DL (ref 8–23)
BUN/CREAT SERPL: 17.6 (ref 7–25)
CALCIUM SPEC-SCNC: 9.6 MG/DL (ref 8.6–10.5)
CHLORIDE SERPL-SCNC: 106 MMOL/L (ref 98–107)
CHOLEST SERPL-MCNC: 142 MG/DL (ref 0–200)
CO2 SERPL-SCNC: 22.6 MMOL/L (ref 22–29)
CREAT SERPL-MCNC: 0.85 MG/DL (ref 0.57–1)
DEPRECATED RDW RBC AUTO: 40.5 FL (ref 37–54)
EGFRCR SERPLBLD CKD-EPI 2021: 68.9 ML/MIN/1.73
EOSINOPHIL # BLD AUTO: 0.1 10*3/MM3 (ref 0–0.4)
EOSINOPHIL NFR BLD AUTO: 1.8 % (ref 0.3–6.2)
ERYTHROCYTE [DISTWIDTH] IN BLOOD BY AUTOMATED COUNT: 12.3 % (ref 12.3–15.4)
GLOBULIN UR ELPH-MCNC: 2.7 GM/DL
GLUCOSE SERPL-MCNC: 103 MG/DL (ref 65–99)
HBA1C MFR BLD: 6.3 % (ref 4.8–5.6)
HCT VFR BLD AUTO: 38.6 % (ref 34–46.6)
HDLC SERPL-MCNC: 37 MG/DL (ref 40–60)
HGB BLD-MCNC: 13.5 G/DL (ref 12–15.9)
IMM GRANULOCYTES # BLD AUTO: 0.02 10*3/MM3 (ref 0–0.05)
IMM GRANULOCYTES NFR BLD AUTO: 0.4 % (ref 0–0.5)
LDLC SERPL CALC-MCNC: 86 MG/DL (ref 0–100)
LDLC/HDLC SERPL: 2.28 {RATIO}
LYMPHOCYTES # BLD AUTO: 2.71 10*3/MM3 (ref 0.7–3.1)
LYMPHOCYTES NFR BLD AUTO: 47.9 % (ref 19.6–45.3)
MCH RBC QN AUTO: 31.8 PG (ref 26.6–33)
MCHC RBC AUTO-ENTMCNC: 35 G/DL (ref 31.5–35.7)
MCV RBC AUTO: 90.8 FL (ref 79–97)
MONOCYTES # BLD AUTO: 0.77 10*3/MM3 (ref 0.1–0.9)
MONOCYTES NFR BLD AUTO: 13.6 % (ref 5–12)
NEUTROPHILS NFR BLD AUTO: 2.03 10*3/MM3 (ref 1.7–7)
NEUTROPHILS NFR BLD AUTO: 35.8 % (ref 42.7–76)
NRBC BLD AUTO-RTO: 0 /100 WBC (ref 0–0.2)
PLATELET # BLD AUTO: 219 10*3/MM3 (ref 140–450)
PMV BLD AUTO: 9.6 FL (ref 6–12)
POTASSIUM SERPL-SCNC: 4.4 MMOL/L (ref 3.5–5.2)
PROT SERPL-MCNC: 6.7 G/DL (ref 6–8.5)
RBC # BLD AUTO: 4.25 10*6/MM3 (ref 3.77–5.28)
SODIUM SERPL-SCNC: 140 MMOL/L (ref 136–145)
TRIGL SERPL-MCNC: 104 MG/DL (ref 0–150)
TSH SERPL DL<=0.05 MIU/L-ACNC: 2.29 UIU/ML (ref 0.27–4.2)
VLDLC SERPL-MCNC: 19 MG/DL (ref 5–40)
WBC NRBC COR # BLD: 5.66 10*3/MM3 (ref 3.4–10.8)

## 2023-09-21 PROCEDURE — 83036 HEMOGLOBIN GLYCOSYLATED A1C: CPT

## 2023-09-21 PROCEDURE — 36415 COLL VENOUS BLD VENIPUNCTURE: CPT

## 2023-09-21 PROCEDURE — 80061 LIPID PANEL: CPT

## 2023-09-21 PROCEDURE — 82306 VITAMIN D 25 HYDROXY: CPT

## 2023-09-21 PROCEDURE — 80053 COMPREHEN METABOLIC PANEL: CPT

## 2023-09-21 PROCEDURE — 85025 COMPLETE CBC W/AUTO DIFF WBC: CPT

## 2023-09-21 PROCEDURE — 84443 ASSAY THYROID STIM HORMONE: CPT

## 2023-09-26 ENCOUNTER — OFFICE VISIT (OUTPATIENT)
Dept: INTERNAL MEDICINE | Facility: CLINIC | Age: 83
End: 2023-09-26
Payer: MEDICARE

## 2023-09-26 VITALS
WEIGHT: 164 LBS | TEMPERATURE: 98.7 F | BODY MASS INDEX: 30.18 KG/M2 | SYSTOLIC BLOOD PRESSURE: 120 MMHG | OXYGEN SATURATION: 97 % | HEIGHT: 62 IN | DIASTOLIC BLOOD PRESSURE: 70 MMHG | HEART RATE: 69 BPM

## 2023-09-26 DIAGNOSIS — E78.2 MIXED HYPERLIPIDEMIA: Primary | ICD-10-CM

## 2023-09-26 DIAGNOSIS — J30.1 NON-SEASONAL ALLERGIC RHINITIS DUE TO POLLEN: ICD-10-CM

## 2023-09-26 DIAGNOSIS — K21.9 GASTRO-ESOPHAGEAL REFLUX DISEASE WITHOUT ESOPHAGITIS: ICD-10-CM

## 2023-09-26 DIAGNOSIS — Z12.31 BREAST CANCER SCREENING BY MAMMOGRAM: ICD-10-CM

## 2023-09-26 DIAGNOSIS — E55.9 VITAMIN D DEFICIENCY: ICD-10-CM

## 2023-09-26 DIAGNOSIS — N39.46 MIXED STRESS AND URGE URINARY INCONTINENCE: ICD-10-CM

## 2023-09-26 DIAGNOSIS — Z23 NEED FOR VACCINATION: ICD-10-CM

## 2023-09-26 DIAGNOSIS — R73.01 IMPAIRED FASTING GLUCOSE: ICD-10-CM

## 2023-09-26 DIAGNOSIS — R05.3 CHRONIC COUGH: ICD-10-CM

## 2023-09-26 PROBLEM — R19.5 POSITIVE COLORECTAL CANCER SCREENING USING COLOGUARD TEST: Status: RESOLVED | Noted: 2021-10-06 | Resolved: 2023-09-26

## 2023-09-26 PROBLEM — I82.90 VENOUS THROMBOEMBOLISM (VTE): Status: RESOLVED | Noted: 2021-10-03 | Resolved: 2023-09-26

## 2023-09-26 PROBLEM — Z86.718 HISTORY OF DVT OF LOWER EXTREMITY: Status: ACTIVE | Noted: 2023-09-26

## 2023-09-26 PROCEDURE — 1159F MED LIST DOCD IN RCRD: CPT | Performed by: INTERNAL MEDICINE

## 2023-09-26 PROCEDURE — G0008 ADMIN INFLUENZA VIRUS VAC: HCPCS | Performed by: INTERNAL MEDICINE

## 2023-09-26 PROCEDURE — 1160F RVW MEDS BY RX/DR IN RCRD: CPT | Performed by: INTERNAL MEDICINE

## 2023-09-26 PROCEDURE — 90662 IIV NO PRSV INCREASED AG IM: CPT | Performed by: INTERNAL MEDICINE

## 2023-09-26 PROCEDURE — 99214 OFFICE O/P EST MOD 30 MIN: CPT | Performed by: INTERNAL MEDICINE

## 2023-09-26 RX ORDER — GUAIFENESIN AND CODEINE PHOSPHATE 100; 10 MG/5ML; MG/5ML
5 SOLUTION ORAL NIGHTLY
Qty: 180 ML | Refills: 0 | Status: SHIPPED | OUTPATIENT
Start: 2023-09-26

## 2023-09-26 RX ORDER — MONTELUKAST SODIUM 10 MG/1
10 TABLET ORAL NIGHTLY
Qty: 90 TABLET | Refills: 1 | Status: SHIPPED | OUTPATIENT
Start: 2023-09-26

## 2023-09-26 NOTE — ASSESSMENT & PLAN NOTE
Vitamin D is 68 as of 9/23, patient stable to continue just low-dose over-the-counter supplementation.

## 2023-09-26 NOTE — ASSESSMENT & PLAN NOTE
A1c is down to 6.3 without treatment as of her 9/23 office visit.  Certainly nice to see, we will keep an eye on this with each visit going forward still.

## 2023-09-26 NOTE — ASSESSMENT & PLAN NOTE
Patient having cough as of 9/23.  May have viral infection, her differential is flipped towards the sides.  She is unable to use eyes antihistamines due to severe dry eyes.  She is not recalling using Singulair before, we will send over another prescription for this and see if it helps some.  Cough medicine prescribed this office visit as well.

## 2023-09-26 NOTE — ASSESSMENT & PLAN NOTE
This is a ongoing issue unfortunately as of 9/23.  She is failed medications this regards, and unfortunately appointment with Urogyn is still over 6 months away.

## 2023-09-26 NOTE — ASSESSMENT & PLAN NOTE
No dysphagia no significant dyspepsia on chronic PPI as of 9/23.  Patient stable to continue low dose of Nexium.

## 2023-09-26 NOTE — ASSESSMENT & PLAN NOTE
LDL is down from 103 to 86 as of her 9/23 office visit.  This is without any changes to her regimen, she is stable to continue with just moderate dose pravastatin.

## 2023-11-06 RX ORDER — PRAVASTATIN SODIUM 40 MG
TABLET ORAL
Qty: 90 TABLET | Refills: 1 | Status: SHIPPED | OUTPATIENT
Start: 2023-11-06

## 2023-11-08 NOTE — PROGRESS NOTES
"Chief Complaint  Hyperlipidemia, Follow-up (Pt states that this is routine, she had labs. ), and Cough (Pt states that she is starting to cough, she had this all last winter and she feels this is what caused her bladder to fall. She states that she can't afford to cough. She hasn't been taking the Mucinex or Singulair)    Subjective          Laura Cardenas presents to [facility]     History of Present Illness  83-year-old female with underlying hyperlipidemia, IFG, reflux, who is coming in 9/23 for routine 6-month follow-up.  We will go over her med list in detail, review her labs, and make further recommendations at that time.    Review of Systems   Constitutional:  Negative for appetite change, fatigue and fever.   HENT:  Negative for congestion and ear pain.    Eyes:  Negative for blurred vision.   Respiratory:  Negative for cough, chest tightness, shortness of breath and wheezing.    Cardiovascular:  Negative for chest pain, palpitations and leg swelling.   Gastrointestinal:  Negative for abdominal pain.   Genitourinary:  Negative for difficulty urinating, dysuria and hematuria.   Musculoskeletal:  Negative for arthralgias and gait problem.   Skin:  Negative for skin lesions.   Neurological:  Negative for syncope, memory problem and confusion.   Psychiatric/Behavioral:  Negative for self-injury and depressed mood.      Objective   Vital Signs:   /70   Pulse 69   Temp 98.7 °F (37.1 °C) (Skin)   Ht 157.5 cm (62.01\")   Wt 74.4 kg (164 lb)   SpO2 97%   BMI 29.99 kg/m²           Physical Exam  Vitals and nursing note reviewed.   Constitutional:       General: She is not in acute distress.     Appearance: Normal appearance. She is not toxic-appearing.   HENT:      Head: Atraumatic.      Right Ear: External ear normal.      Left Ear: External ear normal.      Nose: Nose normal.      Mouth/Throat:      Mouth: Mucous membranes are moist.   Eyes:      General:         Right " ----- Message from Galina Green sent at 11/7/2023  2:11 PM CST -----  Regarding: Needs sooner appt  Type:  Sooner Appointment Request    Caller is requesting a sooner appointment.  Caller declined first available appointment listed below.  Caller will not accept being placed on the waitlist and is requesting a message be sent to doctor.    Name of Caller:  Paz  When is the first available appointment?  Jan 2 2024  Symptoms:  wellness check  Would the patient rather a call back or a response via [facility]? Call back  Best Call Back Number:  [phone]    Additional Information:  Pt was hoping she could get her wellness before the end of 2023 due to insurance purposes, please call to advise            eye: No discharge.         Left eye: No discharge.      Extraocular Movements: Extraocular movements intact.      Pupils: Pupils are equal, round, and reactive to light.   Cardiovascular:      Rate and Rhythm: Normal rate and regular rhythm.      Pulses: Normal pulses.      Heart sounds: Normal heart sounds. No murmur heard.    No gallop.   Pulmonary:      Effort: Pulmonary effort is normal. No respiratory distress.      Breath sounds: No wheezing, rhonchi or rales.   Abdominal:      General: There is no distension.      Palpations: Abdomen is soft. There is no mass.      Tenderness: There is no abdominal tenderness. There is no guarding.   Musculoskeletal:         General: No swelling or tenderness.      Cervical back: No tenderness.      Right lower leg: No edema.      Left lower leg: No edema.   Skin:     General: Skin is warm and dry.      Findings: No rash.   Neurological:      General: No focal deficit present.      Mental Status: She is alert and oriented to person, place, and time. Mental status is at baseline.      Motor: No weakness.      Gait: Gait normal.   Psychiatric:         Mood and Affect: Mood normal.         Thought Content: Thought content normal.        Result Review :   The following data was reviewed by: Ari Brady MD on 10/06/2021:                  Assessment and Plan    Diagnoses and all orders for this visit:    1. Mixed hyperlipidemia (Primary)  Assessment & Plan:  LDL is down from 103 to 86 as of her 9/23 office visit.  This is without any changes to her regimen, she is stable to continue with just moderate dose pravastatin.    Orders:  -     Comprehensive Metabolic Panel; Future  -     Lipid Panel; Future    2. Impaired fasting glucose  Assessment & Plan:  A1c is down to 6.3 without treatment as of her 9/23 office visit.  Certainly nice to see, we will keep an eye on this with each visit going forward still.    Orders:  -     Hemoglobin A1c; Future    3. Need for vaccination  -      Fluzone High-Dose 65+yrs ([id number])    4. Chronic cough  -     guaiFENesin-codeine (GUAIFENESIN AC) 100-10 MG/5ML liquid; Take 5 mL by mouth Every Night.  Dispense: 180 mL; Refill: 0  -     CBC & Differential; Future    5. Breast cancer screening by mammogram  -     Mammo Screening Digital Tomosynthesis Bilateral With CAD; Future    6. Vitamin D deficiency  Assessment & Plan:  Vitamin D is 68 as of 9/23, patient stable to continue just low-dose over-the-counter supplementation.      7. Gastro-esophageal reflux disease without esophagitis  Assessment & Plan:  No dysphagia no significant dyspepsia on chronic PPI as of 9/23.  Patient stable to continue low dose of Nexium.      8. Mixed stress and urge urinary incontinence  Assessment & Plan:  This is a ongoing issue unfortunately as of 9/23.  She is failed medications this regards, and unfortunately appointment with Urogyn is still over 6 months away.      9. Non-seasonal allergic rhinitis due to pollen  Assessment & Plan:  Patient having cough as of 9/23.  May have viral infection, her differential is flipped towards the sides.  She is unable to use eyes antihistamines due to severe dry eyes.  She is not recalling using Singulair before, we will send over another prescription for this and see if it helps some.  Cough medicine prescribed this office visit as well.      Other orders  -     montelukast (Singulair) 10 MG tablet; Take 1 tablet by mouth Every Night.  Dispense: 90 tablet; Refill: 1       --  --  OLDER NOTES:  VISIT 3/21:  ANNUAL MEDICARE WELLNESS EXAM 9/20 = reviewed all forms in office with pt; no new discoveries.  OVERWEIGHT and d/w plan of care=lower diet by 500 jim and 10,000 steps/day.  --  ANXIETY D/O per HPI 4/17 = gasps for breath periodically, ever since had tubes tied b/c relative told her it would do that; lost sister, daughter with pulm fibrosis, grand-daughter with blood clot and pregnant,  had MI few months ago, so will start  "SSRI...better 7/17 = no c/o.  --  PALPITATIONS = x3, at rest, but not very active though, very brief, but will add low dose beta blocker on RTO if same c/o.  \"HTN\" = white-coat or whathaveyou b/c is very normal elsewhere...says it's fine at home...wnl in office again today---> fine at home.  --  IFG is stable w/o meds=5.8 (1/18)...6.4---> 6.2 holding in 3/21.  LIPIDS  and rec tx (3/15)... down 40 to 130 and rec 20 mg dose now...LDL 90 is holding...94... LDL 76 and TG's neg, so does not need fish oil...83 in 9/20---> 95 in 3/21 is ok.  --  ESSENTIAL TREMOR of head...neg si/sx parkinsons...d/w tx if she desires.  --  GERD w/o dysphagia on PPI qd and occ tums for breakthrough.  ZOSTER/RASH per HPI=flat vesicular, grouped and follows pattern of L4, so will treat as ZOSTER despite itch; call if no help...drying up, but pain now, so neurontin...no c/o...rec Shingrix 7/18.  --  VIT D DEF remains stable=45 (7/18)...44---> 51 in 9/20.  OSTEOPENIA...BMD 8/13 = spine -1.4, hip -1.5...BMD 4/17 = spine -1.4, hip -2.2 and I rec tx now with reclast 7/17...she tolerated this...BMD 9/19 = spine -0.9, hip -1.7....Reclast x 3 as of 3/20---> will repeat this fall '21.  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 Uses aleve rarely...daily now, so rec try to work tylenol in there instead...on bid aleve and I rec try to use tylenol instead once again at 1/18 OV... on tylenol and wants to add voltaren gel 1/19 OV.  RLS and will try sinemet 1/19 OV.  DVT after 2/20 L TKR=off meds as of 9/20 with no sxs.  --  --  MMG 12/19/22 per me.   COLON 12/21 = 10 mm poylp = 3 yrs per Dr Thomas.  Pneumovax, Prevnar done; Hep A x2; Shingirx rec 7/18.  (, 6/1/19 was 62 yrs, to retire this month 9/14; takes care of great-grandkids as of 7/19 OV; Daughter with Pulm Fibrosis moved in with them recently as of 3/21 OV; she is followed by Dr Avalos; on 8L as of 3/21; this daughter has daughter who " is RN going for NP is caring for her--->she passed 8/21).    Follow Up   Return in about 6 months (around 3/26/2024).  Patient was given instructions and counseling regarding her condition or for health maintenance advice. Please see specific information pulled into the AVS if appropriate.

## 2023-12-21 ENCOUNTER — HOSPITAL ENCOUNTER (OUTPATIENT)
Dept: MAMMOGRAPHY | Facility: HOSPITAL | Age: 83
Discharge: HOME | End: 2023-12-21
Admitting: INTERNAL MEDICINE
Payer: MEDICARE

## 2023-12-21 ENCOUNTER — OFFICE VISIT (OUTPATIENT)
Dept: ORTHOPEDIC SURGERY | Facility: CLINIC | Age: 83
End: 2023-12-21
Payer: MEDICARE

## 2023-12-21 VITALS
SYSTOLIC BLOOD PRESSURE: 158 MMHG | HEIGHT: 62 IN | HEART RATE: 75 BPM | DIASTOLIC BLOOD PRESSURE: 78 MMHG | OXYGEN SATURATION: 93 % | WEIGHT: 164 LBS | BODY MASS INDEX: 30.18 KG/M2

## 2023-12-21 DIAGNOSIS — M25.552 LEFT HIP PAIN: Primary | ICD-10-CM

## 2023-12-21 DIAGNOSIS — Z12.31 BREAST CANCER SCREENING BY MAMMOGRAM: ICD-10-CM

## 2023-12-21 DIAGNOSIS — M16.12 OSTEOARTHRITIS OF LEFT HIP, UNSPECIFIED OSTEOARTHRITIS TYPE: ICD-10-CM

## 2023-12-21 PROCEDURE — 77063 BREAST TOMOSYNTHESIS BI: CPT

## 2023-12-21 PROCEDURE — 77067 SCR MAMMO BI INCL CAD: CPT

## 2023-12-21 RX ORDER — DEXAMETHASONE SODIUM PHOSPHATE 4 MG/ML
8 INJECTION, SOLUTION INTRA-ARTICULAR; INTRALESIONAL; INTRAMUSCULAR; INTRAVENOUS; SOFT TISSUE ONCE
Status: COMPLETED | OUTPATIENT
Start: 2023-12-21 | End: 2023-12-21

## 2023-12-21 RX ORDER — MELOXICAM 7.5 MG/1
7.5 TABLET ORAL DAILY
Qty: 30 TABLET | Refills: 1 | Status: SHIPPED | OUTPATIENT
Start: 2023-12-21

## 2023-12-21 RX ADMIN — DEXAMETHASONE SODIUM PHOSPHATE 8 MG: 4 INJECTION, SOLUTION INTRA-ARTICULAR; INTRALESIONAL; INTRAMUSCULAR; INTRAVENOUS; SOFT TISSUE at 08:42

## 2023-12-21 NOTE — PROGRESS NOTES
"Chief Complaint  Initial Evaluation of the Left Hip     Subjective      Laura Cardenas presents to [facility] for initial evaluation of the left hip. She is having pain in the left hip for several weeks.  She has had no fall or injury.  The pain is in the groin and on the lateral aspect of the hip.  She sometimes has pain down her thigh and sometimes in her \"butt.\"  She has difficulty getting in and out of bed and the car.  She has pain with sleeping.     Allergies   Allergen Reactions    Apixaban Hives        Social History     Socioeconomic History    Marital status:    Tobacco Use    Smoking status: Former     Packs/day: 0.50     Years: 39.00     Additional pack years: 0.00     Total pack years: 19.50     Types: Cigarettes     Quit date: 1996     Years since quittin.2    Smokeless tobacco: Never   Vaping Use    Vaping Use: Never used   Substance and Sexual Activity    Alcohol use: Never    Drug use: Never    Sexual activity: Defer        I reviewed the patient's chief complaint, history of present illness, review of systems, past medical history, surgical history, family history, social history, medications, and allergy list.     Review of Systems     Constitutional: Denies fevers, chills, weight loss  Cardiovascular: Denies chest pain, shortness of breath  Skin: Denies rashes, acute skin changes  Neurologic: Denies headache, loss of consciousness        Vital Signs:   /78   Pulse 75   Ht 157.5 cm (62\")   Wt 74.4 kg (164 lb)   SpO2 93%   BMI 30.00 kg/m²          Physical Exam  General: Alert. No acute distress    Ortho Exam        LEFT HIP No skin discoloration, atrophy or swelling. Positive EHL, FHL, GS, and TA. Sensation intact to all 5 nerves of the foot. Negative straight leg raise. Leg lengths appear equal. Ambulates with Antalgic gait Negative Austen. Negative Jono. Good strength to hamstrings, quadriceps, dorsiflexors, and plantar flexors. Knee " Extensor Mechanism  intact  She has decrease ROM of the left hip for flexion and mild pain with movement.       Procedures      Imaging Results (Most Recent)       Procedure Component Value Units Date/Time    XR Hip With or Without Pelvis 2 - 3 View Left [[id number]] Resulted: 12/21/23 0801     Updated: 12/21/23 0803             Result Review :     X-Ray Report:  Left hip X-Ray  Indication: Evaluation of the left hip  AP/Lateral view(s)  Findings: Moderate arthritis with joint space narrowing.   Prior studies available for comparison: no             Assessment and Plan     Diagnoses and all orders for this visit:    1. Left hip pain (Primary)  -     XR Hip With or Without Pelvis 2 - 3 View Left    2. Osteoarthritis of left hip, unspecified osteoarthritis type        Discussed the treatment plan with the patient. I reviewed the X-rays that were obtained today with the patient.     Discussed anti inflammatory, injections and therapy.     Prescribed anti inflammatory.     Discussed the risks and benefits of conservative measures. The patient expressed understanding and wished to proceed with a left hip IM injection.  She tolerated the injection well.       Call or return if worsening symptoms.    Follow Up     PRN      Patient was given instructions and counseling regarding her condition or for health maintenance advice. Please see specific information pulled into the AVS if appropriate.     Scribed for Edi Lindsey MD by Cynthia Olivo MA.  12/21/23   08:00 EST    I have personally performed the services described in this document as scribed by the above individual and it is both accurate and complete. Edi Lindsey MD 12/21/23

## 2024-01-03 ENCOUNTER — TELEPHONE (OUTPATIENT)
Dept: ORTHOPEDIC SURGERY | Facility: CLINIC | Age: 84
End: 2024-01-03
Payer: MEDICARE

## 2024-01-03 RX ORDER — MELOXICAM 15 MG/1
15 TABLET ORAL DAILY
Qty: 30 TABLET | Refills: 2 | Status: SHIPPED | OUTPATIENT
Start: 2024-01-03

## 2024-02-08 ENCOUNTER — TELEPHONE (OUTPATIENT)
Dept: ORTHOPEDIC SURGERY | Facility: CLINIC | Age: 84
End: 2024-02-08
Payer: MEDICARE

## 2024-02-08 RX ORDER — DICLOFENAC SODIUM 75 MG/1
75 TABLET, DELAYED RELEASE ORAL 2 TIMES DAILY
Qty: 60 TABLET | Refills: 1 | Status: SHIPPED | OUTPATIENT
Start: 2024-02-08

## 2024-02-08 NOTE — TELEPHONE ENCOUNTER
PATIENT CALLED AND STATES [facility] IS NOT HELPING HER.  CAN SOMETHING ELSE BE SENT INTO PHARMACY?--[facility] [place]

## 2024-02-13 RX ORDER — ESCITALOPRAM OXALATE 10 MG/1
10 TABLET ORAL DAILY
Qty: 90 TABLET | Refills: 1 | Status: SHIPPED | OUTPATIENT
Start: 2024-02-13

## 2024-02-15 ENCOUNTER — TELEPHONE (OUTPATIENT)
Dept: ORTHOPEDIC SURGERY | Facility: CLINIC | Age: 84
End: 2024-02-15
Payer: MEDICARE

## 2024-02-22 ENCOUNTER — OFFICE VISIT (OUTPATIENT)
Dept: ORTHOPEDIC SURGERY | Facility: CLINIC | Age: 84
End: 2024-02-22
Payer: MEDICARE

## 2024-02-22 VITALS — BODY MASS INDEX: 30.18 KG/M2 | HEIGHT: 62 IN | HEART RATE: 71 BPM | WEIGHT: 164 LBS | OXYGEN SATURATION: 97 %

## 2024-02-22 DIAGNOSIS — M54.50 LUMBAR BACK PAIN: ICD-10-CM

## 2024-02-22 DIAGNOSIS — M25.551 RIGHT HIP PAIN: Primary | ICD-10-CM

## 2024-02-22 DIAGNOSIS — M16.0 OSTEOARTHRITIS OF BOTH HIPS, UNSPECIFIED OSTEOARTHRITIS TYPE: ICD-10-CM

## 2024-02-22 DIAGNOSIS — M25.552 LEFT HIP PAIN: ICD-10-CM

## 2024-02-22 NOTE — PROGRESS NOTES
"Chief Complaint  Follow-up and Pain of the Left Hip and Follow-up and Pain of the Right Hip     Subjective      Laura Cardenas presents to [facility] for follow up of bilateral hips. She has had pain in the hips for awhile.  She has pain in the groin, she has pain across her back and in her butt and the pain goes down all the way to her knees.  She has had to use a walker at times for support. She takes diclofenac for arthritis medication.  She has difficulty with prolonged standing, ambulation and stairs.     Allergies   Allergen Reactions    Apixaban Hives        Social History     Socioeconomic History    Marital status:    Tobacco Use    Smoking status: Former     Packs/day: 0.50     Years: 39.00     Additional pack years: 0.00     Total pack years: 19.50     Types: Cigarettes     Quit date: 1996     Years since quittin.4    Smokeless tobacco: Never   Vaping Use    Vaping Use: Never used   Substance and Sexual Activity    Alcohol use: Never    Drug use: Never    Sexual activity: Defer        I reviewed the patient's chief complaint, history of present illness, review of systems, past medical history, surgical history, family history, social history, medications, and allergy list.     Review of Systems     Constitutional: Denies fevers, chills, weight loss  Cardiovascular: Denies chest pain, shortness of breath  Skin: Denies rashes, acute skin changes  Neurologic: Denies headache, loss of consciousness        Vital Signs:   Pulse 71   Ht 157.5 cm (62\")   Wt 74.4 kg (164 lb)   SpO2 97%   BMI 30.00 kg/m²          Physical Exam  General: Alert. No acute distress    Ortho Exam        BILATERAL HIPS  No skin discoloration, atrophy or swelling. Positive EHL, FHL, GS, and TA. Sensation intact to all 5 nerves of the foot. Negative straight leg raise. Leg lengths appear equal. Ambulates with Antalgic gait Negative Austen. Negative Jono. Good strength to hamstrings, " quadriceps, dorsiflexors, and plantar flexors. Knee Extensor Mechanism  intact        Procedures      Imaging Results (Most Recent)       Procedure Component Value Units Date/Time    XR Hip With or Without Pelvis 2 - 3 View Right [[id number]] Resulted: 02/22/24 0909     Updated: 02/22/24 0911             Result Review :     X-Ray Report:  Right hip X-Ray  Indication: Evaluation of the right hip  AP/Lateral view(s)  Findings: Moderate arthritis.  No fracture or dislocation.   Prior studies available for comparison: yes     12/21/23 X ray left hip    Narrative & Impression   X-Ray Report:  Left hip X-Ray  Indication: Evaluation of the left hip  AP/Lateral view(s)  Findings: Moderate arthritis with joint space narrowing.   Prior studies available for comparison: no            Assessment and Plan     Diagnoses and all orders for this visit:    1. Right hip pain (Primary)  -     XR Hip With or Without Pelvis 2 - 3 View Right    2. Left hip pain    3. Lumbar back pain    4. Osteoarthritis of both hips, unspecified osteoarthritis type        Discussed the treatment plan with the patient. I reviewed the X-rays that were obtained today with the patient.     Prescribed physical therapy.     Continue anti inflammatory.     Call or return if worsening symptoms.    Follow Up     PRN      Patient was given instructions and counseling regarding her condition or for health maintenance advice. Please see specific information pulled into the AVS if appropriate.     Scribed for Edi Lindsey MD by Cynthia Olivo MA.  02/22/24   09:11 EST    I have personally performed the services described in this document as scribed by the above individual and it is both accurate and complete. Edi Lindsey MD 02/22/24

## 2024-03-19 ENCOUNTER — LAB (OUTPATIENT)
Dept: LAB | Facility: HOSPITAL | Age: 84
End: 2024-03-19
Payer: MEDICARE

## 2024-03-19 DIAGNOSIS — R73.01 IMPAIRED FASTING GLUCOSE: ICD-10-CM

## 2024-03-19 DIAGNOSIS — E78.2 MIXED HYPERLIPIDEMIA: ICD-10-CM

## 2024-03-19 DIAGNOSIS — R05.3 CHRONIC COUGH: ICD-10-CM

## 2024-03-19 LAB
ALBUMIN SERPL-MCNC: 4 G/DL (ref 3.5–5.2)
ALBUMIN/GLOB SERPL: 1.6 G/DL
ALP SERPL-CCNC: 96 U/L (ref 39–117)
ALT SERPL W P-5'-P-CCNC: 13 U/L (ref 1–33)
ANION GAP SERPL CALCULATED.3IONS-SCNC: 9.7 MMOL/L (ref 5–15)
AST SERPL-CCNC: 16 U/L (ref 1–32)
BASOPHILS # BLD AUTO: 0.05 10*3/MM3 (ref 0–0.2)
BASOPHILS NFR BLD AUTO: 0.8 % (ref 0–1.5)
BILIRUB SERPL-MCNC: 0.4 MG/DL (ref 0–1.2)
BUN SERPL-MCNC: 23 MG/DL (ref 8–23)
BUN/CREAT SERPL: 29.5 (ref 7–25)
CALCIUM SPEC-SCNC: 9.5 MG/DL (ref 8.6–10.5)
CHLORIDE SERPL-SCNC: 107 MMOL/L (ref 98–107)
CHOLEST SERPL-MCNC: 153 MG/DL (ref 0–200)
CO2 SERPL-SCNC: 23.3 MMOL/L (ref 22–29)
CREAT SERPL-MCNC: 0.78 MG/DL (ref 0.57–1)
DEPRECATED RDW RBC AUTO: 41.8 FL (ref 37–54)
EGFRCR SERPLBLD CKD-EPI 2021: 76.4 ML/MIN/1.73
EOSINOPHIL # BLD AUTO: 0.17 10*3/MM3 (ref 0–0.4)
EOSINOPHIL NFR BLD AUTO: 2.8 % (ref 0.3–6.2)
ERYTHROCYTE [DISTWIDTH] IN BLOOD BY AUTOMATED COUNT: 12.5 % (ref 12.3–15.4)
GLOBULIN UR ELPH-MCNC: 2.5 GM/DL
GLUCOSE SERPL-MCNC: 106 MG/DL (ref 65–99)
HBA1C MFR BLD: 6.6 % (ref 4.8–5.6)
HCT VFR BLD AUTO: 37.6 % (ref 34–46.6)
HDLC SERPL-MCNC: 34 MG/DL (ref 40–60)
HGB BLD-MCNC: 12.7 G/DL (ref 12–15.9)
IMM GRANULOCYTES # BLD AUTO: 0.01 10*3/MM3 (ref 0–0.05)
IMM GRANULOCYTES NFR BLD AUTO: 0.2 % (ref 0–0.5)
LDLC SERPL CALC-MCNC: 92 MG/DL (ref 0–100)
LDLC/HDLC SERPL: 2.58 {RATIO}
LYMPHOCYTES # BLD AUTO: 2.58 10*3/MM3 (ref 0.7–3.1)
LYMPHOCYTES NFR BLD AUTO: 41.7 % (ref 19.6–45.3)
MCH RBC QN AUTO: 31.1 PG (ref 26.6–33)
MCHC RBC AUTO-ENTMCNC: 33.8 G/DL (ref 31.5–35.7)
MCV RBC AUTO: 91.9 FL (ref 79–97)
MONOCYTES # BLD AUTO: 0.59 10*3/MM3 (ref 0.1–0.9)
MONOCYTES NFR BLD AUTO: 9.5 % (ref 5–12)
NEUTROPHILS NFR BLD AUTO: 2.78 10*3/MM3 (ref 1.7–7)
NEUTROPHILS NFR BLD AUTO: 45 % (ref 42.7–76)
NRBC BLD AUTO-RTO: 0 /100 WBC (ref 0–0.2)
PLATELET # BLD AUTO: 248 10*3/MM3 (ref 140–450)
PMV BLD AUTO: 9.7 FL (ref 6–12)
POTASSIUM SERPL-SCNC: 4.4 MMOL/L (ref 3.5–5.2)
PROT SERPL-MCNC: 6.5 G/DL (ref 6–8.5)
RBC # BLD AUTO: 4.09 10*6/MM3 (ref 3.77–5.28)
SODIUM SERPL-SCNC: 140 MMOL/L (ref 136–145)
TRIGL SERPL-MCNC: 156 MG/DL (ref 0–150)
VLDLC SERPL-MCNC: 27 MG/DL (ref 5–40)
WBC NRBC COR # BLD AUTO: 6.18 10*3/MM3 (ref 3.4–10.8)

## 2024-03-19 PROCEDURE — 36415 COLL VENOUS BLD VENIPUNCTURE: CPT

## 2024-03-19 PROCEDURE — 83036 HEMOGLOBIN GLYCOSYLATED A1C: CPT

## 2024-03-19 PROCEDURE — 80053 COMPREHEN METABOLIC PANEL: CPT

## 2024-03-19 PROCEDURE — 85025 COMPLETE CBC W/AUTO DIFF WBC: CPT

## 2024-03-19 PROCEDURE — 80061 LIPID PANEL: CPT

## 2024-03-26 ENCOUNTER — OFFICE VISIT (OUTPATIENT)
Dept: INTERNAL MEDICINE | Age: 84
End: 2024-03-26
Payer: MEDICARE

## 2024-03-26 ENCOUNTER — HOSPITAL ENCOUNTER (OUTPATIENT)
Dept: GENERAL RADIOLOGY | Facility: HOSPITAL | Age: 84
Discharge: HOME | End: 2024-03-26
Admitting: INTERNAL MEDICINE
Payer: MEDICARE

## 2024-03-26 VITALS
OXYGEN SATURATION: 99 % | HEART RATE: 68 BPM | BODY MASS INDEX: 30.62 KG/M2 | TEMPERATURE: 98.4 F | SYSTOLIC BLOOD PRESSURE: 132 MMHG | DIASTOLIC BLOOD PRESSURE: 70 MMHG | HEIGHT: 62 IN | WEIGHT: 166.4 LBS

## 2024-03-26 DIAGNOSIS — M54.16 LEFT LUMBAR RADICULOPATHY: ICD-10-CM

## 2024-03-26 DIAGNOSIS — E78.2 MIXED HYPERLIPIDEMIA: Primary | ICD-10-CM

## 2024-03-26 DIAGNOSIS — E55.9 VITAMIN D DEFICIENCY: ICD-10-CM

## 2024-03-26 DIAGNOSIS — Z00.00 WELL ADULT EXAM: ICD-10-CM

## 2024-03-26 DIAGNOSIS — R73.01 IMPAIRED FASTING GLUCOSE: ICD-10-CM

## 2024-03-26 DIAGNOSIS — M81.0 AGE-RELATED OSTEOPOROSIS WITHOUT CURRENT PATHOLOGICAL FRACTURE: ICD-10-CM

## 2024-03-26 DIAGNOSIS — N39.46 MIXED STRESS AND URGE URINARY INCONTINENCE: ICD-10-CM

## 2024-03-26 PROCEDURE — 72100 X-RAY EXAM L-S SPINE 2/3 VWS: CPT

## 2024-03-26 RX ORDER — ESTRADIOL 0.1 MG/G
CREAM VAGINAL
COMMUNITY
Start: 2024-02-21

## 2024-03-26 RX ORDER — SULFAMETHOXAZOLE AND TRIMETHOPRIM 800; 160 MG/1; MG/1
TABLET ORAL
COMMUNITY
Start: 2024-02-26 | End: 2024-03-26

## 2024-03-26 RX ORDER — GABAPENTIN 100 MG/1
CAPSULE ORAL
Qty: 60 CAPSULE | Refills: 2 | Status: SHIPPED | OUTPATIENT
Start: 2024-03-26

## 2024-03-26 RX ORDER — VIBEGRON 75 MG/1
1 TABLET, FILM COATED ORAL DAILY
COMMUNITY
Start: 2024-02-21 | End: 2024-05-21

## 2024-03-26 NOTE — ASSESSMENT & PLAN NOTE
This is patient's main concern as of her 3/24 OV.  She saw Dr. Lindsey a couple of times, she thought it was related to her hips, but apparently just with some mild arthritis in those areas.  He gave her a trial of diclofenac, she has been on it for over a month now with very minimal improvement.  She is just getting started with PT.  She is needing to use a rolling walker at times.  There was no fall.    She had images of her head, not lumbar region, so we will get plain films to start.  Discussed with patient she can complete the current prescription for diclofenac, but cannot refill it.  We will start her on low-dose gabapentin, discussed with her how to titrate the medication.  Will see how things go with PT, asked patient to call if not improving as expected.

## 2024-03-26 NOTE — ASSESSMENT & PLAN NOTE
A1c is up again from 6.3 to 6.6 as of her 3/24 office visit.  She has been at this level previously and was able to get it back down.  She is having issues with her back, decreased mobility and activity secondary to this, so hopefully will get her moving and burning more the sugar off here over the next 3 to 6 months

## 2024-03-26 NOTE — ASSESSMENT & PLAN NOTE
Patient saw Dr. Delacruz, was started on Gemtesa, has had some improvement, but it does not look like she can be able to afford this going forward.  She does have follow-up with Dr. Delacruz next month as of her 3/24 OV

## 2024-03-26 NOTE — ASSESSMENT & PLAN NOTE
Patient did not get Reclast last year, BMD had improved significantly prior to that, will put in for BMD at this time, and if remains stable, just recommend conservative treatment

## 2024-03-26 NOTE — ASSESSMENT & PLAN NOTE
LDL is fairly stable at 92 as of her 3/24 OV.  She being treated for primary prevention, she is on moderate dose pravastatin, stable to continue same.

## 2024-03-26 NOTE — PATIENT INSTRUCTIONS
1.  In regards to the gabapentin, start with just 1 capsule in the evening, and then every 3 days or so you can add another capsule if you need to.  You can occasionally have some sedation with this, so we usually like for you to be taking at least 3 to 400 mg at night before using some during the day.    2.  If the pain is controlled for the most part, but then you start having symptoms around midday, that means you probably need to take 100 mg in the morning.    3.  You can take much more than just the 6 capsules a day, but when you get to that range, we have other strengths of capsules to use, so give a call if you end up needing to take that many.

## 2024-03-26 NOTE — PROGRESS NOTES
"Chief Complaint  Hyperlipidemia, Follow-up (Pt states that this is routine, she had labs. ), and Sciatic left leg pain (Pt states that this is hurting, she saw Ortho, Mobic didn't help. He tried Voltaran, it isn't helping and she has been to PT twice. )    Subjective          Laura Cardenas presents to [facility]     Hyperlipidemia  Pertinent negatives include no chest pain or shortness of breath.     History of present illness:  83-year-old female with underlying hyperlipidemia, IFG, reflux, who is coming in 3/24 for routine 6-month follow-up.  We will go over her med list in detail, review her labs, and make further recommendations at that time.    Review of Systems   Constitutional:  Negative for appetite change, fatigue and fever.   HENT:  Negative for congestion and ear pain.    Eyes:  Negative for blurred vision.   Respiratory:  Negative for cough, chest tightness, shortness of breath and wheezing.    Cardiovascular:  Negative for chest pain, palpitations and leg swelling.   Gastrointestinal:  Negative for abdominal pain.   Genitourinary:  Negative for difficulty urinating, dysuria and hematuria.   Musculoskeletal:  Negative for arthralgias and gait problem.   Skin:  Negative for skin lesions.   Neurological:  Negative for syncope, memory problem and confusion.   Psychiatric/Behavioral:  Negative for self-injury and depressed mood.        Objective   Vital Signs:   /70   Pulse 68   Temp 98.4 °F (36.9 °C) (Skin)   Ht 157.5 cm (62.01\")   Wt 75.5 kg (166 lb 6.4 oz)   SpO2 99%   BMI 30.43 kg/m²           Physical Exam  Vitals and nursing note reviewed.   Constitutional:       General: She is not in acute distress.     Appearance: Normal appearance. She is not toxic-appearing.   HENT:      Head: Atraumatic.      Right Ear: External ear normal.      Left Ear: External ear normal.      Nose: Nose normal.      Mouth/Throat:      Mouth: Mucous membranes are moist. "   Eyes:      General:         Right eye: No discharge.         Left eye: No discharge.      Extraocular Movements: Extraocular movements intact.      Pupils: Pupils are equal, round, and reactive to light.   Cardiovascular:      Rate and Rhythm: Normal rate and regular rhythm.      Pulses: Normal pulses.      Heart sounds: Normal heart sounds. No murmur heard.     No gallop.   Pulmonary:      Effort: Pulmonary effort is normal. No respiratory distress.      Breath sounds: No wheezing, rhonchi or rales.   Abdominal:      General: There is no distension.      Palpations: Abdomen is soft. There is no mass.      Tenderness: There is no abdominal tenderness. There is no guarding.   Musculoskeletal:         General: No swelling or tenderness.      Cervical back: No tenderness.      Right lower leg: No edema.      Left lower leg: No edema.   Skin:     General: Skin is warm and dry.      Findings: No rash.   Neurological:      General: No focal deficit present.      Mental Status: She is alert and oriented to person, place, and time. Mental status is at baseline.      Motor: No weakness.      Gait: Gait normal.   Psychiatric:         Mood and Affect: Mood normal.         Thought Content: Thought content normal.          Result Review :   The following data was reviewed by: Ari Brady MD on 10/06/2021:                  Assessment and Plan    Diagnoses and all orders for this visit:    1. Mixed hyperlipidemia (Primary)  Assessment & Plan:  LDL is fairly stable at 92 as of her 3/24 OV.  She being treated for primary prevention, she is on moderate dose pravastatin, stable to continue same.    Orders:  -     Lipid Panel; Future    2. Impaired fasting glucose  Assessment & Plan:  A1c is up again from 6.3 to 6.6 as of her 3/24 office visit.  She has been at this level previously and was able to get it back down.  She is having issues with her back, decreased mobility and activity secondary to this, so hopefully will get her  moving and burning more the sugar off here over the next 3 to 6 months    Orders:  -     Hemoglobin A1c; Future  -     Comprehensive Metabolic Panel; Future  -     Hemoglobin A1c; Future  -     Basic Metabolic Panel; Future    3. Mixed stress and urge urinary incontinence  Assessment & Plan:  Patient saw Dr. Delacruz, was started on Gemtesa, has had some improvement, but it does not look like she can be able to afford this going forward.  She does have follow-up with Dr. Delacruz next month as of her 3/24 OV      4. Vitamin D deficiency  -     Vitamin D,25-Hydroxy; Future    5. Well adult exam  -     TSH; Future  -     Vitamin B12 anemia; Future  -     Folate anemia; Future    6. Age-related osteoporosis without current pathological fracture  Overview:  BMD 9/19 = spine -0.9, hip -1.7....Reclast x 3 as of 3/20---> patient has BMD scheduled in December, will wait to see that result before we make a determination regarding another dose of Reclast.    BMD 12/21:  Spine -0.1  Hip -1.3.    Assessment & Plan:  Patient did not get Reclast last year, BMD had improved significantly prior to that, will put in for BMD at this time, and if remains stable, just recommend conservative treatment    Orders:  -     DEXA Bone Density Axial    7. Left lumbar radiculopathy  Assessment & Plan:  This is patient's main concern as of her 3/24 OV.  She saw Dr. Lindsey a couple of times, she thought it was related to her hips, but apparently just with some mild arthritis in those areas.  He gave her a trial of diclofenac, she has been on it for over a month now with very minimal improvement.  She is just getting started with PT.  She is needing to use a rolling walker at times.  There was no fall.    She had images of her head, not lumbar region, so we will get plain films to start.  Discussed with patient she can complete the current prescription for diclofenac, but cannot refill it.  We will start her on low-dose gabapentin, discussed with her how  "to titrate the medication.  Will see how things go with PT, asked patient to call if not improving as expected.    Orders:  -     XR Spine Lumbar 2 or 3 View; Future  -     gabapentin (NEURONTIN) 100 MG capsule; Take 1 capsule every evening, may increase by 1 capsule every 3 days until utilizing 6 capsules daily.  Dispense: 60 capsule; Refill: 2         --  --  OLDER NOTES:  VISIT 3/21:  ANNUAL MEDICARE WELLNESS EXAM 9/20 = reviewed all forms in office with pt; no new discoveries.  OVERWEIGHT and d/w plan of care=lower diet by 500 jim and 10,000 steps/day.  --  ANXIETY D/O per HPI 4/17 = gasps for breath periodically, ever since had tubes tied b/c relative told her it would do that; lost sister, daughter with pulm fibrosis, grand-daughter with blood clot and pregnant,  had MI few months ago, so will start SSRI...better 7/17 = no c/o.  --  PALPITATIONS = x3, at rest, but not very active though, very brief, but will add low dose beta blocker on RTO if same c/o.  \"HTN\" = white-coat or whathaveyou b/c is very normal elsewhere...says it's fine at home...wnl in office again today---> fine at home.  --  IFG is stable w/o meds=5.8 (1/18)...6.4---> 6.2 holding in 3/21.  LIPIDS  and rec tx (3/15)... down 40 to 130 and rec 20 mg dose now...LDL 90 is holding...94... LDL 76 and TG's neg, so does not need fish oil...83 in 9/20---> 95 in 3/21 is ok.  --  ESSENTIAL TREMOR of head...neg si/sx parkinsons...d/w tx if she desires.  --  GERD w/o dysphagia on PPI qd and occ tums for breakthrough.  ZOSTER/RASH per HPI=flat vesicular, grouped and follows pattern of L4, so will treat as ZOSTER despite itch; call if no help...drying up, but pain now, so neurontin...no c/o...rec Shingrix 7/18.  --  VIT D DEF remains stable=45 (7/18)...44---> 51 in 9/20.  OSTEOPENIA...BMD 8/13 = spine -1.4, hip -1.5...BMD 4/17 = spine -1.4, hip -2.2 and I rec tx now with reclast 7/17...she tolerated this...BMD 9/19 = spine -0.9, hip " -1.7....Reclast x 3 as of 3/20---> will repeat this fall '21.  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 Uses aleve rarely...daily now, so rec try to work tylenol in there instead...on bid aleve and I rec try to use tylenol instead once again at 1/18 OV... on tylenol and wants to add voltaren gel 1/19 OV.  RLS and will try sinemet 1/19 OV.  DVT after 2/20 L TKR=off meds as of 9/20 with no sxs.  --  --  MMG 12/19/22 per me.   COLON 12/21 = 10 mm poylp = 3 yrs per Dr Thomas.  Pneumovax, Prevnar done; Hep A x2; Shingirx rec 7/18.  (, 6/1/19 was 62 yrs, to retire this month 9/14; takes care of great-grandkids as of 7/19 OV; Daughter with Pulm Fibrosis moved in with them recently as of 3/21 OV; she is followed by Dr Avalos; on 8L as of 3/21; this daughter has daughter who is RN going for NP is caring for her--->she passed 8/21).    Follow Up   Return in about 6 months (around 9/26/2024).  Patient was given instructions and counseling regarding her condition or for health maintenance advice. Please see specific information pulled into the AVS if appropriate.

## 2024-04-01 RX ORDER — MONTELUKAST SODIUM 10 MG/1
10 TABLET ORAL NIGHTLY
Qty: 90 TABLET | Refills: 1 | Status: SHIPPED | OUTPATIENT
Start: 2024-04-01

## 2024-04-09 DIAGNOSIS — M54.16 LEFT LUMBAR RADICULOPATHY: ICD-10-CM

## 2024-04-09 NOTE — TELEPHONE ENCOUNTER
Patient called. States she has only 2 days left on pain medication and one day left on her gabapentin for her sciatic nerve and is nervous about going into the weekend without anything for pain as she is still in a lot of pain. Asking if she could have those refilled please.    Uses [facility]      Pt # [phone]

## 2024-04-10 ENCOUNTER — HOSPITAL ENCOUNTER (OUTPATIENT)
Dept: BONE DENSITY | Facility: HOSPITAL | Age: 84
Discharge: HOME | End: 2024-04-10
Admitting: INTERNAL MEDICINE
Payer: MEDICARE

## 2024-04-10 DIAGNOSIS — M54.16 LEFT LUMBAR RADICULOPATHY: ICD-10-CM

## 2024-04-10 PROCEDURE — 77080 DXA BONE DENSITY AXIAL: CPT

## 2024-04-10 RX ORDER — GABAPENTIN 100 MG/1
CAPSULE ORAL
Qty: 60 CAPSULE | Refills: 2 | Status: CANCELLED | OUTPATIENT
Start: 2024-04-10

## 2024-04-10 RX ORDER — MELOXICAM 7.5 MG/1
TABLET ORAL
Qty: 42 TABLET | Refills: 0 | Status: SHIPPED | OUTPATIENT
Start: 2024-04-10

## 2024-04-10 RX ORDER — GABAPENTIN 300 MG/1
CAPSULE ORAL
Qty: 90 CAPSULE | Refills: 1 | Status: SHIPPED | OUTPATIENT
Start: 2024-04-10

## 2024-04-10 NOTE — TELEPHONE ENCOUNTER
She states that she does not have any more of the Diclofenac.    She is taking the Gabapentin 100mg 1 capsule in the morning and 4 capsules at night.     She doesn't feel that her nerve is relaxed yet. Please advise.     She also wanted you to know that she had her bone density today as well. Thanks.

## 2024-04-11 ENCOUNTER — TELEPHONE (OUTPATIENT)
Dept: INTERNAL MEDICINE | Age: 84
End: 2024-04-11
Payer: MEDICARE

## 2024-04-11 NOTE — TELEPHONE ENCOUNTER
Caller: Laura Cardenas    Relationship to patient: Self    Best call back number: [phone]    Patient is needing: PATIENT CALLED REQUESTING TO SPEAK MARI. PATIENT STATES SHE IS CONFUSED REGARDING THE NEW MEDICATION KELVIN POTTER PRESCRIBED HER AND WOULD LIKE SOME CLARIFICATION REGARDING THIS MEDICATION AND WHY IT WAS PRESCRIBED.

## 2024-04-22 ENCOUNTER — TELEPHONE (OUTPATIENT)
Dept: INTERNAL MEDICINE | Age: 84
End: 2024-04-22

## 2024-04-22 NOTE — TELEPHONE ENCOUNTER
Caller: Laura Cardenas    Relationship: Self    Best call back number: [phone]     What is the best time to reach you: ANY    Who are you requesting to speak with (clinical staff, provider,  specific staff member): CLINICAL STAFF    What was the call regarding: PATIENT CALLED STATING THAT SHE WOULD LIKE TO SPEAK WITH MARI REGARDING AN MRI

## 2024-04-23 NOTE — TELEPHONE ENCOUNTER
Please of the patient know that her bone density is stable, she has osteopenia, but no significant decline, no meds needed at this time.    In regards to the MRI of the hip, this is not something that we order, it has to be per the specialist.  She had x-rays earlier this year by Dr. Lindsey, so I assume she is an active patient with him, so recommend she call him for further follow-up.

## 2024-04-23 NOTE — TELEPHONE ENCOUNTER
Spoke with patient. Family is nagging her about getting an MRI of her left hip because she is having so much pain. Please advise. Also patient had a BMD and is asking for the results.

## 2024-04-24 ENCOUNTER — TELEPHONE (OUTPATIENT)
Dept: INTERNAL MEDICINE | Age: 84
End: 2024-04-24
Payer: MEDICARE

## 2024-04-24 DIAGNOSIS — M25.552 LEFT HIP PAIN: Primary | ICD-10-CM

## 2024-04-30 ENCOUNTER — OFFICE VISIT (OUTPATIENT)
Dept: ORTHOPEDIC SURGERY | Facility: CLINIC | Age: 84
End: 2024-04-30
Payer: MEDICARE

## 2024-04-30 VITALS
WEIGHT: 162 LBS | HEIGHT: 62 IN | SYSTOLIC BLOOD PRESSURE: 153 MMHG | DIASTOLIC BLOOD PRESSURE: 78 MMHG | HEART RATE: 64 BPM | BODY MASS INDEX: 29.81 KG/M2 | OXYGEN SATURATION: 97 %

## 2024-04-30 DIAGNOSIS — M16.12 OSTEOARTHRITIS OF LEFT HIP, UNSPECIFIED OSTEOARTHRITIS TYPE: ICD-10-CM

## 2024-04-30 DIAGNOSIS — M25.552 LEFT HIP PAIN: Primary | ICD-10-CM

## 2024-04-30 PROCEDURE — 99213 OFFICE O/P EST LOW 20 MIN: CPT | Performed by: PHYSICIAN ASSISTANT

## 2024-04-30 PROCEDURE — 1159F MED LIST DOCD IN RCRD: CPT | Performed by: PHYSICIAN ASSISTANT

## 2024-04-30 PROCEDURE — 1160F RVW MEDS BY RX/DR IN RCRD: CPT | Performed by: PHYSICIAN ASSISTANT

## 2024-04-30 NOTE — PROGRESS NOTES
"Chief Complaint  Follow-up and Pain of the Left Hip    Subjective      Laura Cardenas presents to [facility] for follow up of her left hip.  Patient was seen and evaluated in office on 2/22/2024 for left hip pain.  She was prescribed physical therapy and anti-inflammatory.   Today she states she has been doing therapy at [facility]. She states \"It has not helped much\". She has pain in both hips but has groin pain, outside hip pain, in her buttock that radiates to her knee. She has difficulty and pain with sitting, laying on it and getting in and out of the vehicle.  No numbness or tingling sensation.    Allergies   Allergen Reactions    Apixaban Hives       Objective     Vital Signs:   Vitals:    04/30/24 1557   BP: 153/78   Pulse: 64   SpO2: 97%   Weight: 73.5 kg (162 lb)   Height: 157.5 cm (62\")     Body mass index is 29.63 kg/m².    I reviewed the patient's chief complaint, history of present illness, review of systems, past medical history, surgical history, family history, social history, medications, and allergy list.     REVIEW OF SYSTEMS    Constitutional: Denies fevers, chills, weight loss  Cardiovascular: Denies chest pain, shortness of breath  Skin: Denies rashes, acute skin changes  Neurologic: Denies headache, loss of consciousness  MSK: Left hip pain.     Ortho Exam  Left hip: Skin is warm, dry, and intact.  Mild tenderness to palpation of the greater trochanter.  Mild pain reported with internal and external rotation of the hip.  Full flexion and extension of the knee.  Full plantarflexion dorsiflexion the ankle.  Sensation and distal neurovascular intact.          Imaging Results (Most Recent)       None                Assessment and Plan   Diagnoses and all orders for this visit:    1. Left hip pain (Primary)    2. Osteoarthritis of left hip, unspecified osteoarthritis type      Tobacco Use: Medium Risk (4/30/2024)    Patient History     Smoking Tobacco Use: Former     " Smokeless Tobacco Use: Never     Passive Exposure: Not on file     Patient reports that they are a nonsmoker; cessation education not applicable.     Follow Up   No follow-ups on file.  Patient Instructions   Patient with continued complaint of left hip pain, which she describes as lateral, overlying greater trochanteric, as well as into her groin, and at the SI joint.  Did discuss different injection types, including IM, intra-articular, or bursa injections.  Patient declines injection at this point.  Advised continued participation in physical therapy.  Continue anti-inflammatory on an as-needed basis.  Follow-up in 4 weeks.  Call with changes or concerns.    Patient was given instructions and counseling regarding her condition or for health maintenance advice. Please see specific information pulled into the AVS if appropriate.     Dictated Utilizing Dragon Dictation. Please note that portions of this note were completed with a voice recognition program. Part of this note may be an electronic transcription/translation of spoken language to printed text using the Dragon Dictation System.

## 2024-05-01 NOTE — PATIENT INSTRUCTIONS
Patient with continued complaint of left hip pain, which she describes as lateral, overlying greater trochanteric, as well as into her groin, and at the SI joint.  Did discuss different injection types, including IM, intra-articular, or bursa injections.  Patient declines injection at this point.  Advised continued participation in physical therapy.  Continue anti-inflammatory on an as-needed basis.  Follow-up in 4 weeks.  Call with changes or concerns.

## 2024-05-02 ENCOUNTER — HOSPITAL ENCOUNTER (EMERGENCY)
Facility: HOSPITAL | Age: 84
Discharge: HOME | End: 2024-05-02
Attending: EMERGENCY MEDICINE
Payer: MEDICARE

## 2024-05-02 VITALS
HEART RATE: 70 BPM | RESPIRATION RATE: 15 BRPM | HEIGHT: 62 IN | SYSTOLIC BLOOD PRESSURE: 146 MMHG | BODY MASS INDEX: 30.02 KG/M2 | WEIGHT: 163.14 LBS | DIASTOLIC BLOOD PRESSURE: 101 MMHG | OXYGEN SATURATION: 100 % | TEMPERATURE: 98.2 F

## 2024-05-02 DIAGNOSIS — M70.62 TROCHANTERIC BURSITIS OF LEFT HIP: ICD-10-CM

## 2024-05-02 DIAGNOSIS — M16.12 PRIMARY OSTEOARTHRITIS OF LEFT HIP: Primary | ICD-10-CM

## 2024-05-02 PROCEDURE — 96372 THER/PROPH/DIAG INJ SC/IM: CPT

## 2024-05-02 PROCEDURE — 99283 EMERGENCY DEPT VISIT LOW MDM: CPT

## 2024-05-02 PROCEDURE — 25010000002 DEXAMETHASONE SODIUM PHOSPHATE 10 MG/ML SOLUTION

## 2024-05-02 RX ORDER — DEXAMETHASONE SODIUM PHOSPHATE 10 MG/ML
10 INJECTION, SOLUTION INTRAMUSCULAR; INTRAVENOUS ONCE
Status: COMPLETED | OUTPATIENT
Start: 2024-05-02 | End: 2024-05-02

## 2024-05-02 RX ORDER — LIDOCAINE 4 G/G
1 PATCH TOPICAL ONCE
Status: DISCONTINUED | OUTPATIENT
Start: 2024-05-02 | End: 2024-05-02 | Stop reason: HOSPADM

## 2024-05-02 RX ORDER — LIDOCAINE 4 G/G
1 PATCH TOPICAL
Status: DISCONTINUED | OUTPATIENT
Start: 2024-05-02 | End: 2024-05-02

## 2024-05-02 RX ADMIN — DEXAMETHASONE SODIUM PHOSPHATE 10 MG: 10 INJECTION INTRAMUSCULAR; INTRAVENOUS at 12:39

## 2024-05-02 RX ADMIN — LIDOCAINE 1 PATCH: 4 PATCH TOPICAL at 12:39

## 2024-05-02 NOTE — ED PROVIDER NOTES
Time: 1:50 PM EDT  Date of encounter:  5/2/2024  Independent Historian/Clinical History and Information was obtained by:   Patient    History is limited by: N/A    Chief Complaint: left hip pain      History of Present Illness:  Patient is a 83 y.o. year old female who presents to the emergency department for evaluation of left hip pain. Patient states that this has been on-going since January. Denies any recent falls or traumas. She is established with ortho. They have taken multiple x-rays with no significant changes. She was told that she is non-surgical at this time. She has been doing PT with minimal relief. Most of the pain is in the left lateral hip. Ortho has offered steroid injections but she has opted out.    HPI    Patient Care Team  Primary Care Provider: Ari Brady MD    Past Medical History:     Allergies   Allergen Reactions    Apixaban Hives     Past Medical History:   Diagnosis Date    Acute embolism and thrombosis of deep vein of left lower extremity     Age-related osteoporosis without current pathological fracture     Disorder of bone, unspecified     Diverticulosis     DJD (degenerative joint disease)     GERD without esophagitis     History of transfusion     59 years ago for childbirth    Mixed hyperlipidemia     Primary generalized (osteo)arthritis     Spinal headache      Past Surgical History:   Procedure Laterality Date    BACK SURGERY  1990    BACK SURGERY      S/P Lumbar    BREAST BIOPSY      Breast Bx (83 & 98)    CATARACT EXTRACTION      12/11/2015 L Cataract 12/04/2015 R Cataract    COLONOSCOPY  2017    DR. AWAD    COLONOSCOPY N/A 12/1/2021    Procedure: COLONOSCOPY, WITH HOT SNARE POLYPECTOMY,;  Surgeon: Tony Thomas MD;  Location: [facility] ENDOSCOPY;  Service: Gastroenterology;  Laterality: N/A;  DIVERTICULOSIS, COLON POLYP    REPLACEMENT TOTAL KNEE Bilateral     04/10/2019 right knee replacement 01/2020 knee replacement left    TONSILLECTOMY      10 YEARS OLD      Family History   Problem Relation Age of Onset    Cancer Father     Cancer Brother     Simon Hyperthermia Neg Hx        Home Medications:  Prior to Admission medications    Medication Sig Start Date End Date Taking? Authorizing Provider   Calcium Carbonate 1500 (600 Ca) MG tablet Take 1 tablet by mouth 2 (Two) Times a Day With Meals.    Piyush Nguyen MD   diclofenac sodium (VOTAREN XR) 100 MG 24 hr tablet Take 1 tablet by mouth Daily.    Piyush Nguyen MD   escitalopram (LEXAPRO) 10 MG tablet TAKE 1 TABLET BY MOUTH DAILY 24   Ari Brady MD   esomeprazole (NexIUM) 20 MG packet Take 20 mg by mouth Every Morning Before Breakfast.    Piyush Nguyen MD   estradiol (ESTRACE) 0.1 MG/GM vaginal cream Apply 1gm inside the vagina every night for 2 weeks and then 3 times/ week 24   Piyush Nguyen MD   gabapentin (NEURONTIN) 300 MG capsule Take 1 capsule every a.m., take 2 capsules every p.m. 4/10/24   Ari Brady MD   meloxicam (Mobic) 7.5 MG tablet Take 2 tablets once a day for 2 weeks, then 1 tablet once a day. 4/10/24   Ari Brady MD   montelukast (SINGULAIR) 10 MG tablet TAKE ONE TABLET BY MOUTH ONCE NIGHTLY 24   Ari Brady MD   multivitamin (THERAGRAN) tablet tablet Take 1 tablet by mouth Every Night.    Piyush Nguyen MD   pravastatin (PRAVACHOL) 40 MG tablet TAKE ONE TABLET BY MOUTH ONCE NIGHTLY 23   Ari Brady MD   Vibegron (Gemtesa) 75 MG tablet Take 1 tablet by mouth Daily. 24  Piyush Nguyen MD        Social History:   Social History     Tobacco Use    Smoking status: Former     Current packs/day: 0.00     Average packs/day: 0.5 packs/day for 39.0 years (19.5 ttl pk-yrs)     Types: Cigarettes     Start date: 1957     Quit date: 1996     Years since quittin.6    Smokeless tobacco: Never   Vaping Use    Vaping status: Never Used   Substance Use Topics    Alcohol use: Never    Drug use: Never         Review  "of Systems:  Review of Systems   Constitutional:  Negative for chills and fever.   HENT:  Negative for ear pain.    Eyes:  Negative for pain.   Respiratory:  Negative for cough and shortness of breath.    Cardiovascular:  Negative for chest pain.   Gastrointestinal:  Negative for abdominal pain, diarrhea, nausea and vomiting.   Genitourinary:  Negative for dysuria.   Musculoskeletal:  Positive for arthralgias and myalgias.   Skin:  Negative for rash.   Neurological:  Negative for headaches.        Physical Exam:  BP (!) 146/101 (BP Location: Right arm, Patient Position: Sitting)   Pulse 70   Temp 98.2 °F (36.8 °C) (Oral)   Resp 15   Ht 157.5 cm (62.01\")   Wt 74 kg (163 lb 2.3 oz)   SpO2 100%   BMI 29.83 kg/m²     Physical Exam  HENT:      Head: Normocephalic.      Mouth/Throat:      Mouth: Mucous membranes are moist.   Eyes:      Pupils: Pupils are equal, round, and reactive to light.   Pulmonary:      Effort: Pulmonary effort is normal.   Abdominal:      General: There is no distension.   Musculoskeletal:      Cervical back: Neck supple.      Comments: TTP to the left GTB   Skin:     General: Skin is warm and dry.   Neurological:      General: No focal deficit present.      Mental Status: She is alert and oriented to person, place, and time.   Psychiatric:         Mood and Affect: Mood normal.         Behavior: Behavior normal.                  Procedures:  Procedures      Medical Decision Making:      Comorbidities that affect care:    OA, HLD, DJD    External Notes reviewed:    Previous Clinic Note: Reviewed Ortho note on 4/30/24      The following orders were placed and all results were independently analyzed by me:  No orders of the defined types were placed in this encounter.      Medications Given in the Emergency Department:  Medications   Lidocaine 4 % 1 patch (1 patch Transdermal Medication Applied 5/2/24 7036)   dexAMETHasone sodium phosphate injection 10 mg (10 mg Intramuscular Given 5/2/24 1239) "        ED Course:         Labs:    Lab Results (last 24 hours)       ** No results found for the last 24 hours. **             Imaging:    No Radiology Exams Resulted Within Past 24 Hours      Differential Diagnosis and Discussion:    Orthopedic Injuries: Differential diagnosis includes but is not limited to fractures, soft tissue injuries, dislocations, contusions, ligamentous injuries, tendon injuries, nerve injuries, compartment syndrome, bursitis, and vascular injuries.    All X-rays impressions were independently interpreted by me.    MDM  Risk of Complications, Morbidity, and/or Mortality  Presenting problems: moderate  Diagnostic procedures: low  Management options: low    Patient Progress  Patient progress: stable    Patient presents to the emergency department for evaluation of left hip pain. Patient states that this has been on-going since January. Denies any recent falls or traumas. She is established with ortho. They have taken multiple x-rays with no significant changes. She was told that she is non-surgical at this time. She has been doing PT with minimal relief. Most of the pain is in the left lateral hip. Ortho has offered steroid injections but she has opted out.    Started pt on decadron and lidocain patch.    X-Ray Report 12/21/23  Left hip X-Ray  Indication: Evaluation of the left hip  AP/Lateral view(s)  Findings: Moderate arthritis with joint space narrowing.   Prior studies available for comparison: no     Pt will follow up with Ortho. I did discuss with her that she may benefit from joint injections.             Patient Care Considerations:    ANTIBIOTICS: I considered prescribing antibiotics as an outpatient however no bacterial focus of infection was found.      Consultants/Shared Management Plan:    None    Social Determinants of Health:    Patient is independent, reliable, and has access to care.       Disposition and Care Coordination:    Discharged: The patient is suitable and stable  for discharge with no need for consideration of admission.    I have explained the patient´s condition, diagnoses and treatment plan based on the information available to me at this time. I have answered questions and addressed any concerns. The patient has a good  understanding of the patient´s diagnosis, condition, and treatment plan as can be expected at this point. The vital signs have been stable. The patient´s condition is stable and appropriate for discharge from the emergency department.      The patient will pursue further outpatient evaluation with the primary care physician or other designated or consulting physician as outlined in the discharge instructions. They are agreeable to this plan of care and follow-up instructions have been explained in detail. The patient has received these instructions in written format and has expressed an understanding of the discharge instructions. The patient is aware that any significant change in condition or worsening of symptoms should prompt an immediate return to this or the closest emergency department or call to 911.  I have explained discharge medications and the need for follow up with the patient/caretakers. This was also printed in the discharge instructions. Patient was discharged with the following medications and follow up:      Medication List      No changes were made to your prescriptions during this visit.      No follow-up provider specified.     Final diagnoses:   Primary osteoarthritis of left hip   Trochanteric bursitis of left hip        ED Disposition       ED Disposition   Discharge    Condition   Stable    Comment   --               This medical record created using voice recognition software.             Zack Guillermo PA  05/02/24 [id number]

## 2024-05-02 NOTE — DISCHARGE INSTRUCTIONS
Your exam and symptoms are consistent with osteoarthritis of the left hip and left greater trochanteric bursitis. Continue taking your meloxicam at home. Follow up with your orthopedic doctor in 3-5 days. I do agree with them that you might benefit from joint injections.     Return to the Emergency Department if you develop any uncontrollable fever, intractable pain, nausea, vomiting.

## 2024-05-06 RX ORDER — PRAVASTATIN SODIUM 40 MG
TABLET ORAL
Qty: 90 TABLET | Refills: 1 | Status: ON HOLD | OUTPATIENT
Start: 2024-05-06

## 2024-05-07 ENCOUNTER — OFFICE VISIT (OUTPATIENT)
Dept: ORTHOPEDIC SURGERY | Facility: CLINIC | Age: 84
End: 2024-05-07
Payer: MEDICARE

## 2024-05-07 VITALS — WEIGHT: 163 LBS | HEIGHT: 62 IN | OXYGEN SATURATION: 98 % | HEART RATE: 74 BPM | BODY MASS INDEX: 30 KG/M2

## 2024-05-07 DIAGNOSIS — G89.29 CHRONIC LEFT SI JOINT PAIN: ICD-10-CM

## 2024-05-07 DIAGNOSIS — M16.0 OSTEOARTHRITIS OF BOTH HIPS, UNSPECIFIED OSTEOARTHRITIS TYPE: Primary | ICD-10-CM

## 2024-05-07 DIAGNOSIS — M53.3 CHRONIC LEFT SI JOINT PAIN: ICD-10-CM

## 2024-05-07 PROCEDURE — 96372 THER/PROPH/DIAG INJ SC/IM: CPT | Performed by: PHYSICIAN ASSISTANT

## 2024-05-07 PROCEDURE — 99213 OFFICE O/P EST LOW 20 MIN: CPT | Performed by: PHYSICIAN ASSISTANT

## 2024-05-07 RX ORDER — DEXAMETHASONE SODIUM PHOSPHATE 4 MG/ML
8 INJECTION, SOLUTION INTRA-ARTICULAR; INTRALESIONAL; INTRAMUSCULAR; INTRAVENOUS; SOFT TISSUE ONCE
Status: COMPLETED | OUTPATIENT
Start: 2024-05-07 | End: 2024-05-07

## 2024-05-07 RX ADMIN — DEXAMETHASONE SODIUM PHOSPHATE 8 MG: 4 INJECTION, SOLUTION INTRA-ARTICULAR; INTRALESIONAL; INTRAMUSCULAR; INTRAVENOUS; SOFT TISSUE at 10:36

## 2024-05-09 ENCOUNTER — OFFICE VISIT (OUTPATIENT)
Dept: ORTHOPEDIC SURGERY | Facility: CLINIC | Age: 84
End: 2024-05-09
Payer: MEDICARE

## 2024-05-09 ENCOUNTER — TELEPHONE (OUTPATIENT)
Dept: ORTHOPEDIC SURGERY | Facility: CLINIC | Age: 84
End: 2024-05-09

## 2024-05-09 ENCOUNTER — APPOINTMENT (OUTPATIENT)
Dept: CT IMAGING | Facility: HOSPITAL | Age: 84
DRG: 522 | End: 2024-05-09
Payer: MEDICARE

## 2024-05-09 ENCOUNTER — HOSPITAL ENCOUNTER (INPATIENT)
Facility: HOSPITAL | Age: 84
LOS: 4 days | DRG: 522 | End: 2024-05-13
Attending: STUDENT IN AN ORGANIZED HEALTH CARE EDUCATION/TRAINING PROGRAM | Admitting: STUDENT IN AN ORGANIZED HEALTH CARE EDUCATION/TRAINING PROGRAM
Payer: MEDICARE

## 2024-05-09 ENCOUNTER — APPOINTMENT (OUTPATIENT)
Dept: GENERAL RADIOLOGY | Facility: HOSPITAL | Age: 84
DRG: 522 | End: 2024-05-09
Payer: MEDICARE

## 2024-05-09 VITALS — WEIGHT: 163 LBS | BODY MASS INDEX: 30 KG/M2 | HEIGHT: 62 IN

## 2024-05-09 DIAGNOSIS — Z78.9 IMPAIRED MOBILITY AND ADLS: ICD-10-CM

## 2024-05-09 DIAGNOSIS — S72.8X2A OTHER FRACTURE OF LEFT FEMUR, INITIAL ENCOUNTER FOR CLOSED FRACTURE: ICD-10-CM

## 2024-05-09 DIAGNOSIS — Z74.09 IMPAIRED MOBILITY AND ADLS: ICD-10-CM

## 2024-05-09 DIAGNOSIS — S72.002A CLOSED FRACTURE OF NECK OF LEFT FEMUR, INITIAL ENCOUNTER: ICD-10-CM

## 2024-05-09 DIAGNOSIS — M25.552 LEFT HIP PAIN: Primary | ICD-10-CM

## 2024-05-09 DIAGNOSIS — R26.2 DIFFICULTY WALKING: ICD-10-CM

## 2024-05-09 DIAGNOSIS — M87.052 AVASCULAR NECROSIS OF LEFT FEMUR: ICD-10-CM

## 2024-05-09 DIAGNOSIS — S72.002A CLOSED FRACTURE OF LEFT HIP, INITIAL ENCOUNTER: Primary | ICD-10-CM

## 2024-05-09 LAB
ALBUMIN SERPL-MCNC: 4.1 G/DL (ref 3.5–5.2)
ALBUMIN/GLOB SERPL: 1.4 G/DL
ALP SERPL-CCNC: 99 U/L (ref 39–117)
ALT SERPL W P-5'-P-CCNC: 14 U/L (ref 1–33)
ANION GAP SERPL CALCULATED.3IONS-SCNC: 11.9 MMOL/L (ref 5–15)
APTT PPP: 27.1 SECONDS (ref 24.2–34.2)
AST SERPL-CCNC: 17 U/L (ref 1–32)
BASOPHILS # BLD AUTO: 0.02 10*3/MM3 (ref 0–0.2)
BASOPHILS NFR BLD AUTO: 0.2 % (ref 0–1.5)
BILIRUB SERPL-MCNC: 0.4 MG/DL (ref 0–1.2)
BUN SERPL-MCNC: 22 MG/DL (ref 8–23)
BUN/CREAT SERPL: 28.9 (ref 7–25)
CALCIUM SPEC-SCNC: 9.7 MG/DL (ref 8.6–10.5)
CHLORIDE SERPL-SCNC: 103 MMOL/L (ref 98–107)
CO2 SERPL-SCNC: 25.1 MMOL/L (ref 22–29)
CREAT SERPL-MCNC: 0.76 MG/DL (ref 0.57–1)
DEPRECATED RDW RBC AUTO: 42.7 FL (ref 37–54)
EGFRCR SERPLBLD CKD-EPI 2021: 78.8 ML/MIN/1.73
EOSINOPHIL # BLD AUTO: 0.16 10*3/MM3 (ref 0–0.4)
EOSINOPHIL NFR BLD AUTO: 1.4 % (ref 0.3–6.2)
ERYTHROCYTE [DISTWIDTH] IN BLOOD BY AUTOMATED COUNT: 12.8 % (ref 12.3–15.4)
GLOBULIN UR ELPH-MCNC: 3 GM/DL
GLUCOSE SERPL-MCNC: 116 MG/DL (ref 65–99)
HCT VFR BLD AUTO: 41.2 % (ref 34–46.6)
HGB BLD-MCNC: 13.9 G/DL (ref 12–15.9)
IMM GRANULOCYTES # BLD AUTO: 0.03 10*3/MM3 (ref 0–0.05)
IMM GRANULOCYTES NFR BLD AUTO: 0.3 % (ref 0–0.5)
INR PPP: 1.07 (ref 0.86–1.15)
LYMPHOCYTES # BLD AUTO: 3.82 10*3/MM3 (ref 0.7–3.1)
LYMPHOCYTES NFR BLD AUTO: 32.3 % (ref 19.6–45.3)
MCH RBC QN AUTO: 30.8 PG (ref 26.6–33)
MCHC RBC AUTO-ENTMCNC: 33.7 G/DL (ref 31.5–35.7)
MCV RBC AUTO: 91.4 FL (ref 79–97)
MONOCYTES # BLD AUTO: 1.01 10*3/MM3 (ref 0.1–0.9)
MONOCYTES NFR BLD AUTO: 8.5 % (ref 5–12)
NEUTROPHILS NFR BLD AUTO: 57.3 % (ref 42.7–76)
NEUTROPHILS NFR BLD AUTO: 6.79 10*3/MM3 (ref 1.7–7)
NRBC BLD AUTO-RTO: 0 /100 WBC (ref 0–0.2)
PLATELET # BLD AUTO: 287 10*3/MM3 (ref 140–450)
PMV BLD AUTO: 9.1 FL (ref 6–12)
POTASSIUM SERPL-SCNC: 3.7 MMOL/L (ref 3.5–5.2)
PROT SERPL-MCNC: 7.1 G/DL (ref 6–8.5)
PROTHROMBIN TIME: 14.1 SECONDS (ref 11.8–14.9)
RBC # BLD AUTO: 4.51 10*6/MM3 (ref 3.77–5.28)
SODIUM SERPL-SCNC: 140 MMOL/L (ref 136–145)
WBC NRBC COR # BLD AUTO: 11.83 10*3/MM3 (ref 3.4–10.8)

## 2024-05-09 PROCEDURE — 99285 EMERGENCY DEPT VISIT HI MDM: CPT

## 2024-05-09 PROCEDURE — 99215 OFFICE O/P EST HI 40 MIN: CPT | Performed by: PHYSICIAN ASSISTANT

## 2024-05-09 PROCEDURE — 80053 COMPREHEN METABOLIC PANEL: CPT

## 2024-05-09 PROCEDURE — 73700 CT LOWER EXTREMITY W/O DYE: CPT

## 2024-05-09 PROCEDURE — 85025 COMPLETE CBC W/AUTO DIFF WBC: CPT

## 2024-05-09 PROCEDURE — 1159F MED LIST DOCD IN RCRD: CPT | Performed by: PHYSICIAN ASSISTANT

## 2024-05-09 PROCEDURE — 85730 THROMBOPLASTIN TIME PARTIAL: CPT

## 2024-05-09 PROCEDURE — 73502 X-RAY EXAM HIP UNI 2-3 VIEWS: CPT

## 2024-05-09 PROCEDURE — 85610 PROTHROMBIN TIME: CPT

## 2024-05-09 PROCEDURE — 71045 X-RAY EXAM CHEST 1 VIEW: CPT

## 2024-05-09 PROCEDURE — 25010000002 HYDROMORPHONE 1 MG/ML SOLUTION: Performed by: EMERGENCY MEDICINE

## 2024-05-09 PROCEDURE — 82306 VITAMIN D 25 HYDROXY: CPT | Performed by: STUDENT IN AN ORGANIZED HEALTH CARE EDUCATION/TRAINING PROGRAM

## 2024-05-09 PROCEDURE — 1160F RVW MEDS BY RX/DR IN RCRD: CPT | Performed by: PHYSICIAN ASSISTANT

## 2024-05-09 RX ORDER — NICOTINE POLACRILEX 4 MG
15 LOZENGE BUCCAL
Status: DISCONTINUED | OUTPATIENT
Start: 2024-05-09 | End: 2024-05-13

## 2024-05-09 RX ORDER — IBUPROFEN 600 MG/1
1 TABLET ORAL
Status: DISCONTINUED | OUTPATIENT
Start: 2024-05-09 | End: 2024-05-13

## 2024-05-09 RX ORDER — INSULIN LISPRO 100 [IU]/ML
2-7 INJECTION, SOLUTION INTRAVENOUS; SUBCUTANEOUS
Status: DISCONTINUED | OUTPATIENT
Start: 2024-05-09 | End: 2024-05-13

## 2024-05-09 RX ORDER — SODIUM CHLORIDE 0.9 % (FLUSH) 0.9 %
10 SYRINGE (ML) INJECTION EVERY 12 HOURS SCHEDULED
Status: DISCONTINUED | OUTPATIENT
Start: 2024-05-09 | End: 2024-05-13

## 2024-05-09 RX ORDER — SODIUM CHLORIDE 0.9 % (FLUSH) 0.9 %
10 SYRINGE (ML) INJECTION AS NEEDED
Status: DISCONTINUED | OUTPATIENT
Start: 2024-05-09 | End: 2024-05-13

## 2024-05-09 RX ORDER — ACETAMINOPHEN 325 MG/1
650 TABLET ORAL EVERY 6 HOURS PRN
Status: DISCONTINUED | OUTPATIENT
Start: 2024-05-09 | End: 2024-05-13

## 2024-05-09 RX ORDER — AMOXICILLIN 250 MG
2 CAPSULE ORAL 2 TIMES DAILY PRN
Status: DISCONTINUED | OUTPATIENT
Start: 2024-05-09 | End: 2024-05-13

## 2024-05-09 RX ORDER — HYDRALAZINE HYDROCHLORIDE 20 MG/ML
10 INJECTION INTRAMUSCULAR; INTRAVENOUS EVERY 6 HOURS PRN
Status: DISCONTINUED | OUTPATIENT
Start: 2024-05-09 | End: 2024-05-13

## 2024-05-09 RX ORDER — SULFAMETHOXAZOLE AND TRIMETHOPRIM 800; 160 MG/1; MG/1
TABLET ORAL
Status: ON HOLD | COMMUNITY
Start: 2024-04-30 | End: 2024-05-10

## 2024-05-09 RX ORDER — MORPHINE SULFATE 2 MG/ML
1 INJECTION, SOLUTION INTRAMUSCULAR; INTRAVENOUS EVERY 4 HOURS PRN
Status: DISCONTINUED | OUTPATIENT
Start: 2024-05-09 | End: 2024-05-10

## 2024-05-09 RX ORDER — DEXTROSE MONOHYDRATE 25 G/50ML
25 INJECTION, SOLUTION INTRAVENOUS
Status: DISCONTINUED | OUTPATIENT
Start: 2024-05-09 | End: 2024-05-13

## 2024-05-09 RX ORDER — POLYETHYLENE GLYCOL 3350 17 G/17G
17 POWDER, FOR SOLUTION ORAL DAILY PRN
Status: DISCONTINUED | OUTPATIENT
Start: 2024-05-09 | End: 2024-05-13

## 2024-05-09 RX ORDER — OXYCODONE HYDROCHLORIDE 5 MG/1
5 TABLET ORAL EVERY 4 HOURS PRN
Status: DISCONTINUED | OUTPATIENT
Start: 2024-05-09 | End: 2024-05-10

## 2024-05-09 RX ORDER — BISACODYL 5 MG/1
5 TABLET, DELAYED RELEASE ORAL DAILY PRN
Status: DISCONTINUED | OUTPATIENT
Start: 2024-05-09 | End: 2024-05-13

## 2024-05-09 RX ORDER — ONDANSETRON 2 MG/ML
4 INJECTION INTRAMUSCULAR; INTRAVENOUS EVERY 6 HOURS PRN
Status: DISCONTINUED | OUTPATIENT
Start: 2024-05-09 | End: 2024-05-10 | Stop reason: SDUPTHER

## 2024-05-09 RX ORDER — HEPARIN SODIUM 5000 [USP'U]/ML
5000 INJECTION, SOLUTION INTRAVENOUS; SUBCUTANEOUS EVERY 12 HOURS SCHEDULED
Status: DISCONTINUED | OUTPATIENT
Start: 2024-05-09 | End: 2024-05-10

## 2024-05-09 RX ORDER — SODIUM CHLORIDE 9 MG/ML
40 INJECTION, SOLUTION INTRAVENOUS AS NEEDED
Status: DISCONTINUED | OUTPATIENT
Start: 2024-05-09 | End: 2024-05-13

## 2024-05-09 RX ORDER — TRAMADOL HYDROCHLORIDE 50 MG/1
50 TABLET ORAL ONCE
Status: COMPLETED | OUTPATIENT
Start: 2024-05-09 | End: 2024-05-09

## 2024-05-09 RX ORDER — BISACODYL 10 MG
10 SUPPOSITORY, RECTAL RECTAL DAILY PRN
Status: DISCONTINUED | OUTPATIENT
Start: 2024-05-09 | End: 2024-05-13

## 2024-05-09 RX ADMIN — TRAMADOL HYDROCHLORIDE 50 MG: 50 TABLET ORAL at 18:05

## 2024-05-09 RX ADMIN — HYDROMORPHONE HYDROCHLORIDE 1 MG: 1 INJECTION, SOLUTION INTRAMUSCULAR; INTRAVENOUS; SUBCUTANEOUS at 20:17

## 2024-05-09 NOTE — ED PROVIDER NOTES
Time: 5:24 PM EDT  Date of encounter:  5/9/2024  Independent Historian/Clinical History and Information was obtained by:   Patient    History is limited by: N/A    Chief Complaint: L hip pain      History of Present Illness:  Patient is a 83 y.o. year old female who presents to the emergency department for evaluation of left hip pain.  Was sent here from Dr. Lindsey's office and states she supposed to be admitted for surgery.  Patient states that the PA at the orthopedics office called Dr. Cruz who told her to come to the ED. patient has a history of osteopenia.  She denies recent fall or injury.  Patient states the pain has started in January but worsened over the past couple days.  Denies anticoagulants.    HPI    Patient Care Team  Primary Care Provider: Ari Brady MD    Past Medical History:     Allergies   Allergen Reactions    Apixaban Hives     Past Medical History:   Diagnosis Date    Acute embolism and thrombosis of deep vein of left lower extremity     Age-related osteoporosis without current pathological fracture     Disorder of bone, unspecified     Diverticulosis     DJD (degenerative joint disease)     GERD without esophagitis     History of transfusion     61 years ago for childbirth    Mixed hyperlipidemia     Primary generalized (osteo)arthritis     Spinal headache      Past Surgical History:   Procedure Laterality Date    BACK SURGERY  1990    BACK SURGERY      S/P Lumbar    BREAST BIOPSY      Breast Bx (83 & 98)    CATARACT EXTRACTION      12/11/2015 L Cataract 12/04/2015 R Cataract    COLONOSCOPY  2017    DR. AWAD    COLONOSCOPY N/A 12/1/2021    Procedure: COLONOSCOPY, WITH HOT SNARE POLYPECTOMY,;  Surgeon: Tony Thomas MD;  Location: [facility] END[facility];  Service: Gastroenterology;  Laterality: N/A;  DIVERTICULOSIS, COLON POLYP    REPLACEMENT TOTAL KNEE Bilateral     04/10/2019 right knee replacement 01/2020 knee replacement left    TONSILLECTOMY      10 YEARS OLD     Family  History   Problem Relation Age of Onset    Cancer Father     Cancer Brother     [place] Hyperthermia Neg Hx        Home Medications:  Prior to Admission medications    Medication Sig Start Date End Date Taking? Authorizing Provider   Calcium Carbonate 1500 (600 Ca) MG tablet Take 1 tablet by mouth 2 (Two) Times a Day With Meals.    Piyush Nguyen MD   diclofenac sodium (VOTAREN XR) 100 MG 24 hr tablet Take 1 tablet by mouth Daily.    Piyush Nguyen MD   escitalopram (LEXAPRO) 10 MG tablet TAKE 1 TABLET BY MOUTH DAILY 24   Ari Brady MD   esomeprazole (NexIUM) 20 MG packet Take 20 mg by mouth Every Morning Before Breakfast.    Piyush Nguyen MD   estradiol (ESTRACE) 0.1 MG/GM vaginal cream Apply 1gm inside the vagina every night for 2 weeks and then 3 times/ week 24   Piyush Nguyen MD   gabapentin (NEURONTIN) 300 MG capsule Take 1 capsule every a.m., take 2 capsules every p.m. 4/10/24   Ari Brady MD   meloxicam (Mobic) 7.5 MG tablet Take 2 tablets once a day for 2 weeks, then 1 tablet once a day. 4/10/24   Ari Brady MD   montelukast (SINGULAIR) 10 MG tablet TAKE ONE TABLET BY MOUTH ONCE NIGHTLY 24   Ari Brady MD   multivitamin (THERAGRAN) tablet tablet Take 1 tablet by mouth Every Night.    Piyush Nguyen MD   pravastatin (PRAVACHOL) 40 MG tablet TAKE ONE TABLET BY MOUTH ONCE NIGHTLY 24   Ari Brady MD   sulfamethoxazole-trimethoprim (BACTRIM DS,SEPTRA DS) 800-160 MG per tablet  24   Piyush Nguyen MD   Vibegron (Gemtesa) 75 MG tablet Take 1 tablet by mouth Daily. 24  Piyush Nguyen MD        Social History:   Social History     Tobacco Use    Smoking status: Former     Current packs/day: 0.00     Average packs/day: 0.5 packs/day for 39.0 years (19.5 ttl pk-yrs)     Types: Cigarettes     Start date: 1957     Quit date: 1996     Years since quittin.6    Smokeless tobacco: Never   Vaping Use     "Vaping status: Never Used   Substance Use Topics    Alcohol use: Never    Drug use: Never         Review of Systems:  Review of Systems   Constitutional: Negative.    HENT: Negative.     Eyes: Negative.    Respiratory: Negative.     Cardiovascular: Negative.    Gastrointestinal: Negative.    Endocrine: Negative.    Genitourinary: Negative.    Musculoskeletal:  Positive for arthralgias.   Skin: Negative.    Allergic/Immunologic: Negative.    Neurological: Negative.    Hematological: Negative.    Psychiatric/Behavioral: Negative.          Physical Exam:  /73 (BP Location: Right arm, Patient Position: Lying)   Pulse 69   Temp 98.1 °F (36.7 °C) (Oral)   Resp 19   Ht 157.5 cm (62\")   Wt 73.9 kg (162 lb 14.7 oz)   SpO2 100%   BMI 29.80 kg/m²     Physical Exam  Vitals and nursing note reviewed.   Constitutional:       Appearance: Normal appearance.   HENT:      Head: Normocephalic and atraumatic.      Nose: Nose normal.      Mouth/Throat:      Mouth: Mucous membranes are moist.   Eyes:      Extraocular Movements: Extraocular movements intact.      Conjunctiva/sclera: Conjunctivae normal.      Pupils: Pupils are equal, round, and reactive to light.   Cardiovascular:      Rate and Rhythm: Normal rate and regular rhythm.      Heart sounds: Normal heart sounds.   Pulmonary:      Effort: Pulmonary effort is normal.      Breath sounds: Normal breath sounds.   Musculoskeletal:         General: Tenderness present. Normal range of motion.      Cervical back: Normal range of motion and neck supple.   Skin:     General: Skin is warm and dry.   Neurological:      General: No focal deficit present.      Mental Status: She is alert and oriented to person, place, and time.   Psychiatric:         Mood and Affect: Mood normal.         Behavior: Behavior normal.              Procedures:  Procedures      Medical Decision Making:      Comorbidities that affect care:    DJD    External Notes reviewed:    Previous Clinic Note: " Office appointment earlier today with orthopedics for left hip pain.  and Previous Radiological Studies: Her left hip that was done today but not resulted.      The following orders were placed and all results were independently analyzed by me:  Orders Placed This Encounter   Procedures    XR Chest 1 View    XR Hip With or Without Pelvis 2 - 3 View Left    CT Lower Extremity Left Without Contrast    Comprehensive Metabolic Panel    aPTT    Protime-INR    CBC Auto Differential    NPO Diet NPO Type: Sips with Meds    Vital Signs    Intake & Output    Weigh Patient    Oral Care    Saline Lock & Maintain IV Access    Inpatient Hospitalist Consult    Insert Peripheral IV    Inpatient Admission    CBC & Differential       Medications Given in the Emergency Department:  Medications   ceFAZolin 2000 mg IVPB in 100 mL NS (VTB) (has no administration in time range)   TRANEXAMIC ACID 2000 MG IN 50 ML SYRINGE (TOPICAL) SIMPLE syringe 2,000 mg 50 mL (has no administration in time range)   Ortho compound with morphine solution 30 mL (has no administration in time range)   sodium chloride 0.9 % flush 10 mL (has no administration in time range)   sodium chloride 0.9 % flush 10 mL (has no administration in time range)   sodium chloride 0.9 % infusion 40 mL (has no administration in time range)   heparin (porcine) 5000 UNIT/ML injection 5,000 Units (has no administration in time range)   sennosides-docusate (PERICOLACE) 8.6-50 MG per tablet 2 tablet (has no administration in time range)     And   polyethylene glycol (MIRALAX) packet 17 g (has no administration in time range)     And   bisacodyl (DULCOLAX) EC tablet 5 mg (has no administration in time range)     And   bisacodyl (DULCOLAX) suppository 10 mg (has no administration in time range)   traMADol (ULTRAM) tablet 50 mg (50 mg Oral Given 5/9/24 1805)   HYDROmorphone (DILAUDID) injection 1 mg (1 mg Intravenous Given 5/9/24 2017)        ED Course:    ED Course as of 05/09/24 2025    Thu May 09, 2024   1826 RN called and states that radiology is unable to read the x-ray that was done PTA. [AJ]   2002 Basophils Absolute: 0.02  Consulted with Dr. Cruz.  Text.  He would like CT of the hip and admitted to hospitalist. [AJ]      ED Course User Index  [AJ] Maria Dolores Flanagan PA-C       Labs:    Lab Results (last 24 hours)       Procedure Component Value Units Date/Time    Comprehensive Metabolic Panel [[id number]]  (Abnormal) Collected: 05/09/24 1811    Specimen: Blood Updated: 05/09/24 1838     Glucose 116 mg/dL      BUN 22 mg/dL      Creatinine 0.76 mg/dL      Sodium 140 mmol/L      Potassium 3.7 mmol/L      Chloride 103 mmol/L      CO2 25.1 mmol/L      Calcium 9.7 mg/dL      Total Protein 7.1 g/dL      Albumin 4.1 g/dL      ALT (SGPT) 14 U/L      AST (SGOT) 17 U/L      Alkaline Phosphatase 99 U/L      Total Bilirubin 0.4 mg/dL      Globulin 3.0 gm/dL      A/G Ratio 1.4 g/dL      BUN/Creatinine Ratio 28.9     Anion Gap 11.9 mmol/L      eGFR 78.8 mL/min/1.73     Narrative:      GFR Normal >60  Chronic Kidney Disease <60  Kidney Failure <15    The GFR formula is only valid for adults with stable renal function between ages 18 and 70.    CBC & Differential [[id number]]  (Abnormal) Collected: 05/09/24 1811    Specimen: Blood Updated: 05/09/24 1821    Narrative:      The following orders were created for panel order CBC & Differential.  Procedure                               Abnormality         Status                     ---------                               -----------         ------                     CBC Auto Differential[[id number]]        Abnormal            Final result                 Please view results for these tests on the individual orders.    aPTT [[id number]]  (Normal) Collected: 05/09/24 1811    Specimen: Blood from Arm, Left Updated: 05/09/24 1829     PTT 27.1 seconds     Protime-INR [[id number]]  (Normal) Collected: 05/09/24 1811    Specimen: Blood Updated: 05/09/24 1828      Protime 14.1 Seconds      INR 1.07    Narrative:      Suggested Therapeutic Ranges For Oral Anticoagulant Therapy:  Level of Therapy                      INR Target Range  Standard Dose                            2.0-3.0  High Dose                                2.5-3.5  Patients not receiving anticoagulant  Therapy Normal Range                     0.86-1.15    CBC Auto Differential [[id number]]  (Abnormal) Collected: 05/09/24 1811    Specimen: Blood Updated: 05/09/24 1821     WBC 11.83 10*3/mm3      RBC 4.51 10*6/mm3      Hemoglobin 13.9 g/dL      Hematocrit 41.2 %      MCV 91.4 fL      MCH 30.8 pg      MCHC 33.7 g/dL      RDW 12.8 %      RDW-SD 42.7 fl      MPV 9.1 fL      Platelets 287 10*3/mm3      Neutrophil % 57.3 %      Lymphocyte % 32.3 %      Monocyte % 8.5 %      Eosinophil % 1.4 %      Basophil % 0.2 %      Immature Grans % 0.3 %      Neutrophils, Absolute 6.79 10*3/mm3      Lymphocytes, Absolute 3.82 10*3/mm3      Monocytes, Absolute 1.01 10*3/mm3      Eosinophils, Absolute 0.16 10*3/mm3      Basophils, Absolute 0.02 10*3/mm3      Immature Grans, Absolute 0.03 10*3/mm3      nRBC 0.0 /100 WBC              Imaging:    XR Hip With or Without Pelvis 2 - 3 View Left    Result Date: 5/9/2024  XR HIP W OR WO PELVIS 2-3 VIEW LEFT-  Date of Exam: 5/9/2024 6:34 PM  Indication: pain  Comparison: 2 view right hip dated 2/22/2024  Findings: There has been a previous fracture of the left subcapital femoral neck with some extension into the femoral head. With relation to the acetabulum and femoral head, the femoral neck is displaced superiorly approximately 1.9 cm. The fracture line is vaguely seen.  There is complete loss of the joint space in the superior left hip joint.             There are mild degenerative changes of the right hip joint. Osseous structures otherwise appear unremarkable . Moderate to severe degenerative disc changes are noted in the lower lumbar spine.      Impression: 1.  Moderate deformity  of the left femoral head and neck favored to be secondary to a subcapital femoral neck fracture with some extension into the femoral head. There is resulting malalignment of the femoral head and neck, as discussed above. The vagueness of the fracture line suggest the possibility that the fracture is subacute. It is new since 12/21/2023.   Electronically Signed By-Ibrahima Rose MD On:5/9/2024 7:42 PM      XR Chest 1 View    Result Date: 5/9/2024  XR CHEST 1 VW-  Date of Exam: 5/9/2024 5:43 PM  Indication: pre op  Comparison: Chest AP dated 11/27/2022  Findings: The lungs are clear bilaterally. The cardiac and mediastinal silhouettes appear normal. No effusion is seen.      Impression: 1.  No acute cardiopulmonary disease.   Electronically Signed By-Ibrahima Rose MD On:5/9/2024 6:46 PM         Differential Diagnosis and Discussion:    Joint Pain: Differential diagnosis includes but is not limited to polyarticular arthritis, gout, tendinitis, hemarthrosis, septic arthritis, rheumatoid arthritis, bursitis, degenerative joint disease, joint effusion, autoimmune disorder, trauma, and occult neoplasm.    All labs were reviewed and interpreted by me.  All X-rays impressions were independently interpreted by me.    MDM     Amount and/or Complexity of Data Reviewed  Clinical lab tests: reviewed  Tests in the radiology section of CPT®: reviewed                 Patient Care Considerations:    SEPSIS was considered but is NOT present in the emergency department as SIRS criteria is not present.      Consultants/Shared Management Plan:    Hospitalist: I have discussed the case with Dr. Najera  who agrees to accept the patient for admission.  Consultant: I have discussed the case with Dr. Cruz, orthopedics who states order a CT and admit to hospitalist        Social Determinants of Health:    Patient is independent, reliable, and has access to care.       Disposition and Care Coordination:    Admit:   Through independent  evaluation of the patient's history, physical, and imperical data, the patient meets criteria for inpatient admission to the hospital.        Final diagnoses:   Closed fracture of neck of left femur, initial encounter        ED Disposition       ED Disposition   Decision to Admit    Condition   --    Comment   Level of Care: Med/Surg [1]   Diagnosis: Hip fx, left, closed, initial encounter [[id number]]   Certification: I Certify That Inpatient Hospital Services Are Medically Necessary For Greater Than 2 Midnights                 This medical record created using voice recognition software.             Maria Dolores Flanagan PA-C  05/09/24 2025

## 2024-05-09 NOTE — ED NOTES
RN spoke with radiology department regarding left hip xray taken today. Radiology staff stated they would ask the doctor in the department if he could get it read.

## 2024-05-10 ENCOUNTER — APPOINTMENT (OUTPATIENT)
Dept: GENERAL RADIOLOGY | Facility: HOSPITAL | Age: 84
DRG: 522 | End: 2024-05-10
Payer: MEDICARE

## 2024-05-10 ENCOUNTER — ANESTHESIA (OUTPATIENT)
Dept: PERIOP | Facility: HOSPITAL | Age: 84
End: 2024-05-10
Payer: MEDICARE

## 2024-05-10 ENCOUNTER — ANESTHESIA EVENT (OUTPATIENT)
Dept: PERIOP | Facility: HOSPITAL | Age: 84
End: 2024-05-10
Payer: MEDICARE

## 2024-05-10 LAB
25(OH)D3 SERPL-MCNC: 66 NG/ML (ref 30–100)
ANION GAP SERPL CALCULATED.3IONS-SCNC: 7 MMOL/L (ref 5–15)
BASOPHILS # BLD AUTO: 0.03 10*3/MM3 (ref 0–0.2)
BASOPHILS NFR BLD AUTO: 0.3 % (ref 0–1.5)
BUN SERPL-MCNC: 23 MG/DL (ref 8–23)
BUN/CREAT SERPL: 32.9 (ref 7–25)
CALCIUM SPEC-SCNC: 9.4 MG/DL (ref 8.6–10.5)
CHLORIDE SERPL-SCNC: 104 MMOL/L (ref 98–107)
CO2 SERPL-SCNC: 26 MMOL/L (ref 22–29)
CREAT SERPL-MCNC: 0.7 MG/DL (ref 0.57–1)
DEPRECATED RDW RBC AUTO: 43.3 FL (ref 37–54)
EGFRCR SERPLBLD CKD-EPI 2021: 87 ML/MIN/1.73
EOSINOPHIL # BLD AUTO: 0.07 10*3/MM3 (ref 0–0.4)
EOSINOPHIL NFR BLD AUTO: 0.7 % (ref 0.3–6.2)
ERYTHROCYTE [DISTWIDTH] IN BLOOD BY AUTOMATED COUNT: 12.9 % (ref 12.3–15.4)
GLUCOSE BLDC GLUCOMTR-MCNC: 184 MG/DL (ref 70–99)
GLUCOSE BLDC GLUCOMTR-MCNC: 92 MG/DL (ref 70–99)
GLUCOSE BLDC GLUCOMTR-MCNC: 97 MG/DL (ref 70–99)
GLUCOSE SERPL-MCNC: 127 MG/DL (ref 65–99)
HCT VFR BLD AUTO: 39.5 % (ref 34–46.6)
HGB BLD-MCNC: 13 G/DL (ref 12–15.9)
IMM GRANULOCYTES # BLD AUTO: 0.04 10*3/MM3 (ref 0–0.05)
IMM GRANULOCYTES NFR BLD AUTO: 0.4 % (ref 0–0.5)
LYMPHOCYTES # BLD AUTO: 2.46 10*3/MM3 (ref 0.7–3.1)
LYMPHOCYTES NFR BLD AUTO: 24.2 % (ref 19.6–45.3)
MCH RBC QN AUTO: 30.4 PG (ref 26.6–33)
MCHC RBC AUTO-ENTMCNC: 32.9 G/DL (ref 31.5–35.7)
MCV RBC AUTO: 92.5 FL (ref 79–97)
MONOCYTES # BLD AUTO: 0.95 10*3/MM3 (ref 0.1–0.9)
MONOCYTES NFR BLD AUTO: 9.3 % (ref 5–12)
NEUTROPHILS NFR BLD AUTO: 6.63 10*3/MM3 (ref 1.7–7)
NEUTROPHILS NFR BLD AUTO: 65.1 % (ref 42.7–76)
NRBC BLD AUTO-RTO: 0 /100 WBC (ref 0–0.2)
PLATELET # BLD AUTO: 267 10*3/MM3 (ref 140–450)
PMV BLD AUTO: 9.2 FL (ref 6–12)
POTASSIUM SERPL-SCNC: 4.2 MMOL/L (ref 3.5–5.2)
RBC # BLD AUTO: 4.27 10*6/MM3 (ref 3.77–5.28)
SODIUM SERPL-SCNC: 137 MMOL/L (ref 136–145)
WBC NRBC COR # BLD AUTO: 10.18 10*3/MM3 (ref 3.4–10.8)

## 2024-05-10 PROCEDURE — 25010000002 KETOROLAC TROMETHAMINE PER 15 MG: Performed by: STUDENT IN AN ORGANIZED HEALTH CARE EDUCATION/TRAINING PROGRAM

## 2024-05-10 PROCEDURE — 94799 UNLISTED PULMONARY SVC/PX: CPT

## 2024-05-10 PROCEDURE — 25010000002 FENTANYL CITRATE (PF) 50 MCG/ML SOLUTION

## 2024-05-10 PROCEDURE — 25810000003 LACTATED RINGERS PER 1000 ML: Performed by: STUDENT IN AN ORGANIZED HEALTH CARE EDUCATION/TRAINING PROGRAM

## 2024-05-10 PROCEDURE — 25010000002 PROPOFOL 200 MG/20ML EMULSION

## 2024-05-10 PROCEDURE — 25010000002 ONDANSETRON PER 1 MG

## 2024-05-10 PROCEDURE — 88311 DECALCIFY TISSUE: CPT | Performed by: STUDENT IN AN ORGANIZED HEALTH CARE EDUCATION/TRAINING PROGRAM

## 2024-05-10 PROCEDURE — 63710000001 INSULIN LISPRO (HUMAN) PER 5 UNITS: Performed by: STUDENT IN AN ORGANIZED HEALTH CARE EDUCATION/TRAINING PROGRAM

## 2024-05-10 PROCEDURE — 94761 N-INVAS EAR/PLS OXIMETRY MLT: CPT

## 2024-05-10 PROCEDURE — 82948 REAGENT STRIP/BLOOD GLUCOSE: CPT | Performed by: STUDENT IN AN ORGANIZED HEALTH CARE EDUCATION/TRAINING PROGRAM

## 2024-05-10 PROCEDURE — 76000 FLUOROSCOPY <1 HR PHYS/QHP: CPT

## 2024-05-10 PROCEDURE — C1776 JOINT DEVICE (IMPLANTABLE): HCPCS | Performed by: STUDENT IN AN ORGANIZED HEALTH CARE EDUCATION/TRAINING PROGRAM

## 2024-05-10 PROCEDURE — 25010000002 HYDRALAZINE PER 20 MG: Performed by: STUDENT IN AN ORGANIZED HEALTH CARE EDUCATION/TRAINING PROGRAM

## 2024-05-10 PROCEDURE — 80048 BASIC METABOLIC PNL TOTAL CA: CPT | Performed by: STUDENT IN AN ORGANIZED HEALTH CARE EDUCATION/TRAINING PROGRAM

## 2024-05-10 PROCEDURE — 25010000002 MORPHINE PER 10 MG: Performed by: FAMILY MEDICINE

## 2024-05-10 PROCEDURE — 25010000002 HYDROMORPHONE 1 MG/ML SOLUTION: Performed by: STUDENT IN AN ORGANIZED HEALTH CARE EDUCATION/TRAINING PROGRAM

## 2024-05-10 PROCEDURE — 0SRB03Z REPLACEMENT OF LEFT HIP JOINT WITH CERAMIC SYNTHETIC SUBSTITUTE, OPEN APPROACH: ICD-10-PCS | Performed by: STUDENT IN AN ORGANIZED HEALTH CARE EDUCATION/TRAINING PROGRAM

## 2024-05-10 PROCEDURE — 73502 X-RAY EXAM HIP UNI 2-3 VIEWS: CPT

## 2024-05-10 PROCEDURE — 25010000002 SUGAMMADEX 200 MG/2ML SOLUTION

## 2024-05-10 PROCEDURE — 25810000003 LACTATED RINGERS PER 1000 ML

## 2024-05-10 PROCEDURE — 88304 TISSUE EXAM BY PATHOLOGIST: CPT | Performed by: STUDENT IN AN ORGANIZED HEALTH CARE EDUCATION/TRAINING PROGRAM

## 2024-05-10 PROCEDURE — 25010000002 CEFAZOLIN PER 500 MG

## 2024-05-10 PROCEDURE — 27130 TOTAL HIP ARTHROPLASTY: CPT | Performed by: PHYSICIAN ASSISTANT

## 2024-05-10 PROCEDURE — 25010000002 MORPHINE PER 10 MG: Performed by: STUDENT IN AN ORGANIZED HEALTH CARE EDUCATION/TRAINING PROGRAM

## 2024-05-10 PROCEDURE — 85025 COMPLETE CBC W/AUTO DIFF WBC: CPT | Performed by: STUDENT IN AN ORGANIZED HEALTH CARE EDUCATION/TRAINING PROGRAM

## 2024-05-10 PROCEDURE — 25010000002 DEXAMETHASONE PER 1 MG

## 2024-05-10 PROCEDURE — 36415 COLL VENOUS BLD VENIPUNCTURE: CPT | Performed by: STUDENT IN AN ORGANIZED HEALTH CARE EDUCATION/TRAINING PROGRAM

## 2024-05-10 PROCEDURE — 82948 REAGENT STRIP/BLOOD GLUCOSE: CPT

## 2024-05-10 PROCEDURE — 27130 TOTAL HIP ARTHROPLASTY: CPT | Performed by: STUDENT IN AN ORGANIZED HEALTH CARE EDUCATION/TRAINING PROGRAM

## 2024-05-10 PROCEDURE — 25010000002 ROPIVACAINE PER 1 MG: Performed by: STUDENT IN AN ORGANIZED HEALTH CARE EDUCATION/TRAINING PROGRAM

## 2024-05-10 PROCEDURE — 25010000002 EPINEPHRINE 1 MG/ML SOLUTION: Performed by: STUDENT IN AN ORGANIZED HEALTH CARE EDUCATION/TRAINING PROGRAM

## 2024-05-10 DEVICE — LINER ACET G7 VIVACIT/E NTRL HXPE SZB 32MM: Type: IMPLANTABLE DEVICE | Site: HIP | Status: FUNCTIONAL

## 2024-05-10 DEVICE — SCRW ACET CORT TRILOGY S/TAP 6.5X25: Type: IMPLANTABLE DEVICE | Site: HIP | Status: FUNCTIONAL

## 2024-05-10 DEVICE — SHLL ACET G7 PPS LTD/HL TI SZB 46MM: Type: IMPLANTABLE DEVICE | Site: HIP | Status: FUNCTIONAL

## 2024-05-10 DEVICE — STEM FEM/HIP AVENIR/COMPLETE STD/OFFST HA SZ2: Type: IMPLANTABLE DEVICE | Site: HIP | Status: FUNCTIONAL

## 2024-05-10 DEVICE — TOTAL HIP PRIMARY: Type: IMPLANTABLE DEVICE | Site: HIP | Status: FUNCTIONAL

## 2024-05-10 DEVICE — BIOLOX® DELTA, CERAMIC FEMORAL HEAD, M, Ø 32/0, TAPER 12/14
Type: IMPLANTABLE DEVICE | Site: HIP | Status: FUNCTIONAL
Brand: BIOLOX® DELTA

## 2024-05-10 RX ORDER — MORPHINE SULFATE 2 MG/ML
2 INJECTION, SOLUTION INTRAMUSCULAR; INTRAVENOUS EVERY 4 HOURS PRN
Status: DISCONTINUED | OUTPATIENT
Start: 2024-05-10 | End: 2024-05-13

## 2024-05-10 RX ORDER — ASPIRIN 325 MG
325 TABLET ORAL EVERY 12 HOURS SCHEDULED
Status: DISCONTINUED | OUTPATIENT
Start: 2024-05-11 | End: 2024-05-13

## 2024-05-10 RX ORDER — PHENYLEPHRINE HCL IN 0.9% NACL 1 MG/10 ML
SYRINGE (ML) INTRAVENOUS AS NEEDED
Status: DISCONTINUED | OUTPATIENT
Start: 2024-05-10 | End: 2024-05-10 | Stop reason: SURG

## 2024-05-10 RX ORDER — CYCLOBENZAPRINE HCL 10 MG
10 TABLET ORAL ONCE
Status: COMPLETED | OUTPATIENT
Start: 2024-05-10 | End: 2024-05-10

## 2024-05-10 RX ORDER — EPHEDRINE SULFATE 50 MG/ML
INJECTION INTRAVENOUS AS NEEDED
Status: DISCONTINUED | OUTPATIENT
Start: 2024-05-10 | End: 2024-05-10 | Stop reason: SURG

## 2024-05-10 RX ORDER — PROMETHAZINE HYDROCHLORIDE 12.5 MG/1
25 TABLET ORAL ONCE AS NEEDED
Status: DISCONTINUED | OUTPATIENT
Start: 2024-05-10 | End: 2024-05-10 | Stop reason: HOSPADM

## 2024-05-10 RX ORDER — PROMETHAZINE HYDROCHLORIDE 25 MG/1
12.5 TABLET ORAL EVERY 6 HOURS PRN
Status: DISCONTINUED | OUTPATIENT
Start: 2024-05-10 | End: 2024-05-13

## 2024-05-10 RX ORDER — PROMETHAZINE HYDROCHLORIDE 12.5 MG/1
12.5 SUPPOSITORY RECTAL EVERY 6 HOURS PRN
Status: DISCONTINUED | OUTPATIENT
Start: 2024-05-10 | End: 2024-05-13

## 2024-05-10 RX ORDER — ROCURONIUM BROMIDE 10 MG/ML
INJECTION, SOLUTION INTRAVENOUS AS NEEDED
Status: DISCONTINUED | OUTPATIENT
Start: 2024-05-10 | End: 2024-05-10 | Stop reason: SURG

## 2024-05-10 RX ORDER — ONDANSETRON 4 MG/1
4 TABLET, ORALLY DISINTEGRATING ORAL EVERY 6 HOURS PRN
Status: DISCONTINUED | OUTPATIENT
Start: 2024-05-10 | End: 2024-05-13

## 2024-05-10 RX ORDER — ESCITALOPRAM OXALATE 10 MG/1
10 TABLET ORAL DAILY
Status: DISCONTINUED | OUTPATIENT
Start: 2024-05-10 | End: 2024-05-13

## 2024-05-10 RX ORDER — SODIUM CHLORIDE, SODIUM LACTATE, POTASSIUM CHLORIDE, CALCIUM CHLORIDE 600; 310; 30; 20 MG/100ML; MG/100ML; MG/100ML; MG/100ML
INJECTION, SOLUTION INTRAVENOUS CONTINUOUS PRN
Status: DISCONTINUED | OUTPATIENT
Start: 2024-05-10 | End: 2024-05-10 | Stop reason: SURG

## 2024-05-10 RX ORDER — SODIUM CHLORIDE 9 MG/ML
40 INJECTION, SOLUTION INTRAVENOUS AS NEEDED
Status: DISCONTINUED | OUTPATIENT
Start: 2024-05-10 | End: 2024-05-13

## 2024-05-10 RX ORDER — ONDANSETRON 2 MG/ML
4 INJECTION INTRAMUSCULAR; INTRAVENOUS ONCE AS NEEDED
Status: DISCONTINUED | OUTPATIENT
Start: 2024-05-10 | End: 2024-05-10 | Stop reason: HOSPADM

## 2024-05-10 RX ORDER — ONDANSETRON 2 MG/ML
INJECTION INTRAMUSCULAR; INTRAVENOUS AS NEEDED
Status: DISCONTINUED | OUTPATIENT
Start: 2024-05-10 | End: 2024-05-10 | Stop reason: SURG

## 2024-05-10 RX ORDER — MONTELUKAST SODIUM 10 MG/1
10 TABLET ORAL NIGHTLY
Status: DISCONTINUED | OUTPATIENT
Start: 2024-05-10 | End: 2024-05-13

## 2024-05-10 RX ORDER — DICLOFENAC SODIUM 75 MG/1
75 TABLET, DELAYED RELEASE ORAL 2 TIMES DAILY PRN
Status: ON HOLD | COMMUNITY

## 2024-05-10 RX ORDER — MAGNESIUM HYDROXIDE 1200 MG/15ML
LIQUID ORAL AS NEEDED
Status: DISCONTINUED | OUTPATIENT
Start: 2024-05-10 | End: 2024-05-10 | Stop reason: HOSPADM

## 2024-05-10 RX ORDER — OXYCODONE HYDROCHLORIDE 5 MG/1
10 TABLET ORAL EVERY 4 HOURS PRN
Status: DISCONTINUED | OUTPATIENT
Start: 2024-05-10 | End: 2024-05-13

## 2024-05-10 RX ORDER — PRAVASTATIN SODIUM 20 MG
40 TABLET ORAL NIGHTLY
Status: DISCONTINUED | OUTPATIENT
Start: 2024-05-10 | End: 2024-05-13

## 2024-05-10 RX ORDER — GABAPENTIN 300 MG/1
300 CAPSULE ORAL EVERY 12 HOURS SCHEDULED
Status: DISCONTINUED | OUTPATIENT
Start: 2024-05-10 | End: 2024-05-13

## 2024-05-10 RX ORDER — PANTOPRAZOLE SODIUM 40 MG/1
40 TABLET, DELAYED RELEASE ORAL
Status: DISCONTINUED | OUTPATIENT
Start: 2024-05-10 | End: 2024-05-13

## 2024-05-10 RX ORDER — ACETAMINOPHEN 500 MG
1000 TABLET ORAL EVERY 8 HOURS
Status: COMPLETED | OUTPATIENT
Start: 2024-05-10 | End: 2024-05-12

## 2024-05-10 RX ORDER — FERROUS SULFATE 325(65) MG
325 TABLET ORAL
Status: DISCONTINUED | OUTPATIENT
Start: 2024-05-11 | End: 2024-05-13

## 2024-05-10 RX ORDER — AMOXICILLIN 250 MG
2 CAPSULE ORAL 2 TIMES DAILY
Status: DISCONTINUED | OUTPATIENT
Start: 2024-05-10 | End: 2024-05-13

## 2024-05-10 RX ORDER — OXYCODONE HYDROCHLORIDE 5 MG/1
5 TABLET ORAL
Status: DISCONTINUED | OUTPATIENT
Start: 2024-05-10 | End: 2024-05-10 | Stop reason: HOSPADM

## 2024-05-10 RX ORDER — PROPOFOL 10 MG/ML
INJECTION, EMULSION INTRAVENOUS AS NEEDED
Status: DISCONTINUED | OUTPATIENT
Start: 2024-05-10 | End: 2024-05-10 | Stop reason: SURG

## 2024-05-10 RX ORDER — DEXAMETHASONE SODIUM PHOSPHATE 4 MG/ML
INJECTION, SOLUTION INTRA-ARTICULAR; INTRALESIONAL; INTRAMUSCULAR; INTRAVENOUS; SOFT TISSUE AS NEEDED
Status: DISCONTINUED | OUTPATIENT
Start: 2024-05-10 | End: 2024-05-10 | Stop reason: SURG

## 2024-05-10 RX ORDER — ACETAMINOPHEN 325 MG/1
325 TABLET ORAL EVERY 4 HOURS PRN
Status: DISCONTINUED | OUTPATIENT
Start: 2024-05-10 | End: 2024-05-13

## 2024-05-10 RX ORDER — SODIUM CHLORIDE 0.9 % (FLUSH) 0.9 %
10 SYRINGE (ML) INJECTION AS NEEDED
Status: DISCONTINUED | OUTPATIENT
Start: 2024-05-10 | End: 2024-05-13

## 2024-05-10 RX ORDER — ONDANSETRON 2 MG/ML
4 INJECTION INTRAMUSCULAR; INTRAVENOUS EVERY 6 HOURS PRN
Status: DISCONTINUED | OUTPATIENT
Start: 2024-05-10 | End: 2024-05-13

## 2024-05-10 RX ORDER — FENTANYL CITRATE 50 UG/ML
INJECTION, SOLUTION INTRAMUSCULAR; INTRAVENOUS AS NEEDED
Status: DISCONTINUED | OUTPATIENT
Start: 2024-05-10 | End: 2024-05-10 | Stop reason: SURG

## 2024-05-10 RX ORDER — CEFAZOLIN SODIUM 1 G/3ML
INJECTION, POWDER, FOR SOLUTION INTRAMUSCULAR; INTRAVENOUS AS NEEDED
Status: DISCONTINUED | OUTPATIENT
Start: 2024-05-10 | End: 2024-05-10 | Stop reason: SURG

## 2024-05-10 RX ORDER — KETOROLAC TROMETHAMINE 30 MG/ML
15 INJECTION, SOLUTION INTRAMUSCULAR; INTRAVENOUS EVERY 6 HOURS
Status: COMPLETED | OUTPATIENT
Start: 2024-05-10 | End: 2024-05-11

## 2024-05-10 RX ORDER — POLYETHYLENE GLYCOL 3350 17 G/17G
17 POWDER, FOR SOLUTION ORAL DAILY
Status: DISCONTINUED | OUTPATIENT
Start: 2024-05-11 | End: 2024-05-13

## 2024-05-10 RX ORDER — OXYBUTYNIN CHLORIDE 5 MG/1
10 TABLET, EXTENDED RELEASE ORAL DAILY
Status: DISCONTINUED | OUTPATIENT
Start: 2024-05-10 | End: 2024-05-13

## 2024-05-10 RX ORDER — LIDOCAINE HYDROCHLORIDE 20 MG/ML
INJECTION, SOLUTION EPIDURAL; INFILTRATION; INTRACAUDAL; PERINEURAL AS NEEDED
Status: DISCONTINUED | OUTPATIENT
Start: 2024-05-10 | End: 2024-05-10 | Stop reason: SURG

## 2024-05-10 RX ORDER — BISACODYL 10 MG
10 SUPPOSITORY, RECTAL RECTAL DAILY PRN
Status: DISCONTINUED | OUTPATIENT
Start: 2024-05-10 | End: 2024-05-13

## 2024-05-10 RX ORDER — DEXMEDETOMIDINE HYDROCHLORIDE 100 UG/ML
INJECTION, SOLUTION INTRAVENOUS AS NEEDED
Status: DISCONTINUED | OUTPATIENT
Start: 2024-05-10 | End: 2024-05-10 | Stop reason: SURG

## 2024-05-10 RX ORDER — SODIUM CHLORIDE, SODIUM LACTATE, POTASSIUM CHLORIDE, CALCIUM CHLORIDE 600; 310; 30; 20 MG/100ML; MG/100ML; MG/100ML; MG/100ML
100 INJECTION, SOLUTION INTRAVENOUS CONTINUOUS
Status: DISCONTINUED | OUTPATIENT
Start: 2024-05-10 | End: 2024-05-11

## 2024-05-10 RX ORDER — SODIUM CHLORIDE 0.9 % (FLUSH) 0.9 %
10 SYRINGE (ML) INJECTION EVERY 12 HOURS SCHEDULED
Status: DISCONTINUED | OUTPATIENT
Start: 2024-05-10 | End: 2024-05-13

## 2024-05-10 RX ORDER — PROMETHAZINE HYDROCHLORIDE 25 MG/1
25 SUPPOSITORY RECTAL ONCE AS NEEDED
Status: DISCONTINUED | OUTPATIENT
Start: 2024-05-10 | End: 2024-05-10 | Stop reason: HOSPADM

## 2024-05-10 RX ORDER — OXYCODONE HYDROCHLORIDE 5 MG/1
5 TABLET ORAL EVERY 4 HOURS PRN
Status: DISCONTINUED | OUTPATIENT
Start: 2024-05-10 | End: 2024-05-13

## 2024-05-10 RX ADMIN — Medication 10 ML: at 21:57

## 2024-05-10 RX ADMIN — MORPHINE SULFATE 2 MG: 2 INJECTION, SOLUTION INTRAMUSCULAR; INTRAVENOUS at 12:04

## 2024-05-10 RX ADMIN — PROPOFOL 100 MG: 10 INJECTION, EMULSION INTRAVENOUS at 17:53

## 2024-05-10 RX ADMIN — MONTELUKAST 10 MG: 10 TABLET, FILM COATED ORAL at 21:55

## 2024-05-10 RX ADMIN — SODIUM CHLORIDE, POTASSIUM CHLORIDE, SODIUM LACTATE AND CALCIUM CHLORIDE: 600; 310; 30; 20 INJECTION, SOLUTION INTRAVENOUS at 17:47

## 2024-05-10 RX ADMIN — OXYCODONE 5 MG: 5 TABLET ORAL at 06:13

## 2024-05-10 RX ADMIN — CEFAZOLIN 2 G: 1 INJECTION, POWDER, FOR SOLUTION INTRAMUSCULAR; INTRAVENOUS at 18:00

## 2024-05-10 RX ADMIN — OXYCODONE 5 MG: 5 TABLET ORAL at 10:27

## 2024-05-10 RX ADMIN — EPHEDRINE SULFATE 10 MG: 50 INJECTION INTRAVENOUS at 19:23

## 2024-05-10 RX ADMIN — PANTOPRAZOLE SODIUM 40 MG: 40 TABLET, DELAYED RELEASE ORAL at 06:14

## 2024-05-10 RX ADMIN — MORPHINE SULFATE 1 MG: 2 INJECTION, SOLUTION INTRAMUSCULAR; INTRAVENOUS at 03:58

## 2024-05-10 RX ADMIN — EPHEDRINE SULFATE 10 MG: 50 INJECTION INTRAVENOUS at 18:50

## 2024-05-10 RX ADMIN — INSULIN LISPRO 2 UNITS: 100 INJECTION, SOLUTION INTRAVENOUS; SUBCUTANEOUS at 21:55

## 2024-05-10 RX ADMIN — DEXAMETHASONE SODIUM PHOSPHATE 4 MG: 4 INJECTION, SOLUTION INTRAMUSCULAR; INTRAVENOUS at 18:00

## 2024-05-10 RX ADMIN — HYDRALAZINE HYDROCHLORIDE 10 MG: 20 INJECTION, SOLUTION INTRAMUSCULAR; INTRAVENOUS at 00:25

## 2024-05-10 RX ADMIN — HYDROMORPHONE HYDROCHLORIDE 0.5 MG: 1 INJECTION, SOLUTION INTRAMUSCULAR; INTRAVENOUS; SUBCUTANEOUS at 16:03

## 2024-05-10 RX ADMIN — ONDANSETRON 4 MG: 2 INJECTION INTRAMUSCULAR; INTRAVENOUS at 19:46

## 2024-05-10 RX ADMIN — Medication 200 MCG: at 18:46

## 2024-05-10 RX ADMIN — KETOROLAC TROMETHAMINE 15 MG: 30 INJECTION, SOLUTION INTRAMUSCULAR; INTRAVENOUS at 21:56

## 2024-05-10 RX ADMIN — SODIUM CHLORIDE, POTASSIUM CHLORIDE, SODIUM LACTATE AND CALCIUM CHLORIDE 100 ML/HR: 600; 310; 30; 20 INJECTION, SOLUTION INTRAVENOUS at 22:14

## 2024-05-10 RX ADMIN — EPHEDRINE SULFATE 10 MG: 50 INJECTION INTRAVENOUS at 18:53

## 2024-05-10 RX ADMIN — FENTANYL CITRATE 100 MCG: 50 INJECTION, SOLUTION INTRAMUSCULAR; INTRAVENOUS at 17:53

## 2024-05-10 RX ADMIN — GABAPENTIN 300 MG: 300 CAPSULE ORAL at 21:55

## 2024-05-10 RX ADMIN — OXYCODONE 5 MG: 5 TABLET ORAL at 01:35

## 2024-05-10 RX ADMIN — MORPHINE SULFATE 1 MG: 2 INJECTION, SOLUTION INTRAMUSCULAR; INTRAVENOUS at 08:13

## 2024-05-10 RX ADMIN — DEXMEDETOMIDINE 20 MCG: 100 INJECTION, SOLUTION INTRAVENOUS at 18:29

## 2024-05-10 RX ADMIN — SODIUM CHLORIDE, POTASSIUM CHLORIDE, SODIUM LACTATE AND CALCIUM CHLORIDE: 600; 310; 30; 20 INJECTION, SOLUTION INTRAVENOUS at 19:34

## 2024-05-10 RX ADMIN — SENNOSIDES AND DOCUSATE SODIUM 2 TABLET: 50; 8.6 TABLET ORAL at 22:02

## 2024-05-10 RX ADMIN — SUGAMMADEX 200 MG: 100 INJECTION, SOLUTION INTRAVENOUS at 19:46

## 2024-05-10 RX ADMIN — OXYCODONE 5 MG: 5 TABLET ORAL at 15:39

## 2024-05-10 RX ADMIN — Medication 200 MCG: at 19:22

## 2024-05-10 RX ADMIN — PRAVASTATIN SODIUM 40 MG: 20 TABLET ORAL at 21:55

## 2024-05-10 RX ADMIN — Medication 200 MCG: at 18:51

## 2024-05-10 RX ADMIN — MORPHINE SULFATE 1 MG: 2 INJECTION, SOLUTION INTRAMUSCULAR; INTRAVENOUS at 00:18

## 2024-05-10 RX ADMIN — CYCLOBENZAPRINE 10 MG: 10 TABLET, FILM COATED ORAL at 03:58

## 2024-05-10 RX ADMIN — ACETAMINOPHEN 1000 MG: 500 TABLET ORAL at 21:55

## 2024-05-10 RX ADMIN — ROCURONIUM BROMIDE 40 MG: 10 INJECTION, SOLUTION INTRAVENOUS at 17:53

## 2024-05-10 RX ADMIN — Medication 100 MCG: at 18:07

## 2024-05-10 RX ADMIN — LIDOCAINE HYDROCHLORIDE 80 MG: 20 INJECTION, SOLUTION EPIDURAL; INFILTRATION; INTRACAUDAL; PERINEURAL at 17:53

## 2024-05-10 NOTE — H&P
[facility] HISTORY AND PHYSICAL  Date: 2024   Patient Name: Laura Cardenas  : 1940  MRN: [id number]  Primary Care Physician:  Ari Brady MD  Date of admission: 2024    Subjective   Subjective     Chief Complaint: Hip pain    HPI:    Laura Cardenas is a 83 y.o. female with past medical history of osteopenia, GERD, chronic left hip pain and hyperlipidemia presents to the ED due to left hip pain.  Patient states having worsening left hip pain for the past 3 months.  About 2 days ago patient had IM steroid injection with orthopedics.  Since then patient's pain continued to worsen.  Patient had a hip x-ray done at orthopedic clinic and was told to go to the ED due to fracture.  Patient has been dealing with chronic left SI joint pain for the the last couple of months.  Denies chest pain, abdominal pain, dysuria, fever, chills, nausea, vomiting, diarrhea or headache.  In the ED, patient's vitals showed temperature 98.1, heart rate 63, respiratory rate 18 and satting 100% room air.  Labs show sodium 140, creatinine 0.76, white blood cell 11.3 and hemoglobin 13.9.  Hip x-ray shows moderate deformity of the left femoral head and neck fracture.  Orthopedic was consulted and will take patient for surgery tomorrow morning.  Patient admitted to floors for further management.      Personal History     Past Medical History:  Past Medical History:   Diagnosis Date    Acute embolism and thrombosis of deep vein of left lower extremity     Age-related osteoporosis without current pathological fracture     Disorder of bone, unspecified     Diverticulosis     DJD (degenerative joint disease)     GERD without esophagitis     History of transfusion     61 years ago for childbirth    Mixed hyperlipidemia     Primary generalized (osteo)arthritis     Spinal headache        Past Surgical History:  Past Surgical History:   Procedure Laterality Date    BACK SURGERY      BACK SURGERY      S/P Lumbar     BREAST BIOPSY      Breast Bx (81 & 98)    CATARACT EXTRACTION      2015 L Cataract 2015 R Cataract    COLONOSCOPY      DR. AWAD    COLONOSCOPY N/A 2021    Procedure: COLONOSCOPY, WITH HOT SNARE POLYPECTOMY,;  Surgeon: Tony Thomas MD;  Location: [facility]COPY;  Service: Gastroenterology;  Laterality: N/A;  DIVERTICULOSIS, COLON POLYP    REPLACEMENT TOTAL KNEE Bilateral     04/10/2019 right knee replacement 2020 knee replacement left    TONSILLECTOMY      10 YEARS OLD       Family History:   Family History   Problem Relation Age of Onset    Cancer Father     Cancer Brother     [place] Hyperthermia Neg Hx        Social History:   Social History     Socioeconomic History    Marital status:    Tobacco Use    Smoking status: Former     Current packs/day: 0.00     Average packs/day: 0.5 packs/day for 39.0 years (19.5 ttl pk-yrs)     Types: Cigarettes     Start date: 1957     Quit date: 1996     Years since quittin.6    Smokeless tobacco: Never   Vaping Use    Vaping status: Never Used   Substance and Sexual Activity    Alcohol use: Never    Drug use: Never    Sexual activity: Defer       Home Medications:  Calcium Carbonate, Vibegron, diclofenac sodium, escitalopram, esomeprazole, estradiol, gabapentin, meloxicam, montelukast, multivitamin, pravastatin, and sulfamethoxazole-trimethoprim    Allergies:  Allergies   Allergen Reactions    Apixaban Hives       Review of Systems   All systems were reviewed and negative except for Above    Objective   Objective     Vitals:   Temp:  [98.1 °F (36.7 °C)] 98.1 °F (36.7 °C)  Heart Rate:  [63-75] 69  Resp:  [18-19] 19  BP: (102-173)/(62-79) 123/73    Physical Exam    Constitutional: Awake, alert, no acute distress   Eyes: Pupils equal, sclerae anicteric, no conjunctival injection   HENT: NCAT, mucous membranes moist   Neck: Supple, no thyromegaly, no lymphadenopathy, trachea midline   Respiratory: Clear to auscultation  bilaterally, nonlabored respirations    Cardiovascular: RRR, no murmurs, rubs, or gallops, palpable pedal pulses bilaterally   Gastrointestinal: Positive bowel sounds, soft, nontender, nondistended   Musculoskeletal: Limited left hip range of motion   Psychiatric: Appropriate affect, cooperative   Neurologic: Oriented x 3, strength symmetric in all extremities, Cranial Nerves grossly intact to confrontation, speech clear   Skin: No rashes     Result Review    Result Review:  I have personally reviewed the results from the time of this admission to 5/9/2024 20:28 EDT and agree with these findings:  [x]  Laboratory  []  Microbiology  [x]  Radiology  []  EKG/Telemetry   []  Cardiology/Vascular   []  Pathology  [x]  Old records  []  Other:      Assessment & Plan   Assessment / Plan     Assessment/Plan:     Left femoral head and neck fracture  Chronic left lumbar radiculopathy  History of osteoporosis  Admit to MedSurg  Monitor vitals  CBC BMP  Vitamin D ordered  Chest x-ray reviewed  Hip pelvis x-ray reviewed  CT hip ordered  Pain meds as needed  Fluids  PT OT consulted  Orthopedic consulted    Prediabetes  Insulin sliding scale    Hyperlipidemia  Start home medication    Mixed stress and urge urinary incontinence  Start home medication      DVT prophylaxis: Heparin subcu      CODE STATUS:         Admission Status:  I believe this patient meets inpatient status.    Electronically signed by Alexander Najera MD, 05/09/24, 8:28 PM EDT.

## 2024-05-10 NOTE — PLAN OF CARE
Goal Outcome Evaluation:   Patient currently down in surgery. Attempted to reposition patient as much as possible to help with pain management. Pain medication given as appropriate. Alberto remains in place.

## 2024-05-10 NOTE — PLAN OF CARE
Goal Outcome Evaluation:  Plan of Care Reviewed With: patient        Progress: improving  Outcome Evaluation: Patient Alert & oriented x 4. able to make needs known. patient admitted to unit on 5/9 for left hip fracture. patient pain was not controlled when patient arrived to unit. patient came to unit in a wheelchair, ER was unable to get her in bed. Patient educated that she should not be putting weight on fracture leg and that staying wheelchair increases her risk of skin break down. Nurse reviewed pain medications with patient. patient agreed to get in bed. patient tolerated fair. patient administer pain medication every 2-4hrs to manged pain. patient states her pain is much better. patient NPO. Patient will have surgery today. call light within reach of patient.

## 2024-05-10 NOTE — OP NOTE
TOTAL HIP ARTHROPLASTY ANTERIOR  Procedure Report    Patient Name:  Laura Cardenas  YOB: 1940    Date of Surgery:  5/10/2024     Indications:  Laura is an 83-year-old female with left hip avascular necrosis and collapse of the femoral head.  We discussed treatment options.  We discussed additional nonoperative management.  We discussed surgical management with left total hip arthroplasty.  We discussed surgical benefits including pain relief and improved function.  We discussed surgical risks including bleeding, infection, damage to nerves or blood vessels, hardware complications, fracture, loosening, dislocation, leg length discrepancy, persistent pain, anesthesia risk including mortality, DVT, and need for additional procedures.  The patient elected to proceed and consent was obtained.    Pre-op Diagnosis:   Closed fracture of left hip, initial encounter [S72.002A]       Post-Op Diagnosis Codes:     * Closed fracture of left hip, initial encounter [S72.002A]    Procedure/CPT® Codes:      Procedure(s):  TOTAL HIP ARTHROPLASTY    Surgical Approach: Hip Direct Anterior (Bush-Ramos)        Staff:  Surgeon(s):  Ivan Cruz MD    Assistant: Kat Mccauley PA-C; Donny Pierre    Anesthesia: General    Estimated Blood Loss: 200 mL    Implants:    Implant Name Type Inv. Item Serial No.  Lot No. LRB No. Used Action   SHLL ACET G7 PPS LTD/HL TI SZB 46MM - [id number] Implant SHLL ACET G7 PPS LTD/HL TI SZB 46MM  [employer] [id number] Left 1 Implanted   LINER ACET G7 VIVACIT/E NTRL HXPE SZB 32MM - [id number] Implant LINER ACET G7 VIVACIT/E NTRL HXPE SZB 32MM  [employer] [id number] Left 1 Implanted   SCRW ACET EDILBERTO TRILOGY S/TAP 6.5X25 - [id number] Implant SCRW ACET EDILBERTO TRILOGY S/TAP 6.5X25  [employer] [id number] Left 1 Implanted   STEM FEM/HIP AVENIR/COMPLETE STD/OFFST HA SZ2 - [id number] Implant STEM FEM/HIP AVENIR/COMPLETE STD/OFFST HA SZ2  [employer] [id number] Left 1 Implanted    HD FEM/HIP BIOLOX/DELTA CERAM 12/22F42FV PLS0MM - [id number] Implant HD FEM/HIP BIOLOX/DELTA CERAM 12/20G14IZ PLS0MM  [employer] [id number] Left 1 Implanted       Specimen:          Specimens       ID Source Type Tests Collected By Collected At Frozen?    A Hip, Left Bone TISSUE PATHOLOGY EXAM   Ivan Cruz MD 5/10/24 1924 No    Description: femoral head                Findings: Left hip avascular necrosis    Complications: None    Description of Procedure: The patient was met in the preoperative holding area and the operative extremity was marked.  The patient was transported to the operating room and general anesthesia was induced without complication.  The patient was then carefully transferred onto the [place] table with both legs well-padded and placed into the traction boots.  2 g of preoperative Ancef was administered.   The left hip was then prepped and draped in the usual sterile fashion.  A timeout was taken to ensure the appropriate patient, procedure, and procedural site.  All were in agreement.  I made a 10 cm longitudinal incision beginning 2 cm distal and lateral to the ASIS and extending toward the fibular head.  Sharp dissection was carried down through the skin and subcutaneous tissues.  Hemostasis was obtained with Bovie electrocautery.  The fascia over the TFL was identified.  This was split sharply in line with the incision.  The fascia was elevated.  The TFL was mobilized.  A Cobra retractor was placed over the superior aspect of the femoral neck.  A Kim elevator was used to develop the interval between the rectus and the anterior aspect of the femur.  A Cobra retractor was then placed over the inferior aspect of the femoral neck.  At the distal aspect of the interval I identified the circumflex femoral vessels.  These were cauterized.  I then performed a T-shaped capsulotomy.  The anterior and posterior limbs of the capsule were tagged with nonabsorbable sutures.  The Cobra retractors were  placed intracapsular.  I then planned and performed a femoral neck cut using C arm assistance.  This was done without complication.  The cut was completed laterally with an osteotome.  Traction was applied to the leg through the [place] table.  I utilized a corkscrew on power to remove the femoral head without complication.  This is almost entirely dissolved secondary to apparent avascular necrosis.  Acetabular retractors were positioned over the anterior and posterior wall without complication.  I sharply excised the labrum from the periphery of the acetabulum.  Head fragments were removed from the acetabular fossa.  The fatty tissue in the fossa was removed with a rongeur. I began reaming under direct visualization.  I started with a 43 reamer.  I medialized down to the medial wall.  I then reamed under C-arm guidance sequentially up to a size 45 reamer.  This had adequate purchase.  I was satisfied with the abduction and anteversion radiographically.  Adequate bleeding bone was present under direct visualization and the anterior and posterior walls were intact.  I irrigated the acetabulum.  I then impacted a Rashawn G7 46 mm cup, matching my previous reamer positioning.  This was done without complication.  The cup had adequate purchase.  The cup was irrigated and was fully seated by direct visualization.  I then used C-arm guidance to drill, measure, and place 1 acetabular screw of size 25 mm in length.  This had excellent purchase.  The cup was again irrigated.  The neutral acetabular liner was then impacted without complication and was fully seated.  Acetabular retractors were then removed.  The femoral hook for the [place] table attachment was then inserted.  All traction was removed from the leg.  The leg was dropped into extension, external rotation, and adduction.  I then performed a capsular release and released soft tissue over the greater trochanter including the piriformis.  I had adequate visualization of  the proximal femur.  I used a rongeur to remove the femoral neck remnant and lateralize proximally.  I then identified the femoral canal utilizing the curved rasp.  I used this to lateralize as well.  I then began broaching.  I started with the starting broach and sequentially broached up to a size 2 for the Avenir Complete system which had excellent purchase.  I calcar planed the neck at this level.  I inserted a -3.5 neck trial with a 32 mm head.  Retractors removed and the hip was reduced.  This was stable in extension and external rotation at 90 degrees.  C-arm fluoroscopy was brought in.  Femoral component sizing was appropriate and no periprosthetic fractures were noted.  The operative leg was slightly short.  I elected for a +0 final neck.  I was satisfied with these trial components.  The hip was dislocated without complication.  The leg was repositioned and retractors were inserted.  Trial components were removed.  I irrigated the proximal femur with normal saline.  I then impacted the size 2 Avenir Complete stem without complication, matching my previous version.  The trunnion and acetabulum were thoroughly irrigated.  The acetabulum was clear.  I impacted the size 32 mm head with a +0 neck without complication.  This was reduced.  The hip was again stable in extension and external rotation at 90 degrees.  Final images were obtained with C arm.  I was satisfied with component positioning.  No complications were noted.  The hip was thoroughly irrigated with Irrisept followed by normal saline.  Adequate hemostasis was obtained.  The capsule was closed with the nonabsorbable tag sutures.  Fascia over the TFL was closed with 0 Vicryl in running fashion.  Topical TXA was applied.  Periarticular local anesthetic was injected.  Subcutaneous tissues were closed with 0 Vicryl and 2-0 Vicryl.  Skin was closed with skin staples.  A sterile Aquacel dressing was applied.  The patient was carefully transported off the  Pankaj table back onto her hospital bed.  Leg lengths and rotation were symmetric.  The patient had palpable pedal pulses.  Patient woke from anesthesia without complication and was transferred to the recovery room in stable condition.  All surgical counts were correct.    Assistant: Kat Mccauley PA-C; Donny Pierre  was responsible for performing the following activities: Retraction, Suction, Irrigation, Suturing, Closing, and Placing Dressing and their skilled assistance was necessary for the success of this case.    Ivan Cruz MD     Date: 5/10/2024  Time: 19:46 EDT

## 2024-05-10 NOTE — CONSULTS
[facility]   Consult Note    Patient Name: Laura Cardenas  : 1940  MRN: [id number]  Primary Care Physician:  Ari Brady MD  Referring Physician: No ref. provider found  Date of admission: 2024    Subjective   Subjective     Reason for Consult/ Chief Complaint: Left hip pain    HPI:  Laura Cardenas is a 83 y.o. female with acute on chronic left hip pain.  She reports this has been worsening over the last 3 months.  No falls or trauma.  She had x-rays at the orthopedic office which revealed collapse of her femoral head with migration and early acetabular bone loss.  She was admitted to the medicine team.  A CT scan was obtained.  She does not take any blood thinning medications.  Prior to her worsening hip pain she reports she ambulated without any assistive devices.    Review of Systems   14 Point ROS is negative except as noted above.     Personal History     Past Medical History:   Diagnosis Date    Acute embolism and thrombosis of deep vein of left lower extremity     Age-related osteoporosis without current pathological fracture     Disorder of bone, unspecified     Diverticulosis     DJD (degenerative joint disease)     GERD without esophagitis     History of transfusion     59 years ago for childbirth    Mixed hyperlipidemia     Primary generalized (osteo)arthritis     Spinal headache        Past Surgical History:   Procedure Laterality Date    BACK SURGERY      BACK SURGERY      S/P Lumbar    BREAST BIOPSY      Breast Bx (81 & 98)    CATARACT EXTRACTION      2015 L Cataract 2015 R Cataract    COLONOSCOPY      DR. AWAD    COLONOSCOPY N/A 2021    Procedure: COLONOSCOPY, WITH HOT SNARE POLYPECTOMY,;  Surgeon: Tony Thomas MD;  Location: [facility] ENDOSCOPY;  Service: Gastroenterology;  Laterality: N/A;  DIVERTICULOSIS, COLON POLYP    REPLACEMENT TOTAL KNEE Bilateral     04/10/2019 right knee replacement 2020 knee replacement left    TONSILLECTOMY       10 YEARS OLD       Family History: family history includes Cancer in her brother and father. Otherwise pertinent FHx was reviewed and not pertinent to current issue.    Social History:  reports that she quit smoking about 27 years ago. Her smoking use included cigarettes. She started smoking about 66 years ago. She has a 19.5 pack-year smoking history. She has never used smokeless tobacco. She reports that she does not drink alcohol and does not use drugs.    Home Medications:  Calcium Carbonate, Vibegron, diclofenac sodium, escitalopram, esomeprazole, estradiol, gabapentin, meloxicam, montelukast, multivitamin, pravastatin, and sulfamethoxazole-trimethoprim    Allergies:  Allergies   Allergen Reactions    Apixaban Hives       Objective    Objective     Vitals:   Temp:  [97.3 °F (36.3 °C)-98.1 °F (36.7 °C)] 97.3 °F (36.3 °C)  Heart Rate:  [63-83] 83  Resp:  [16-19] 16  BP: (102-183)/(62-82) 133/65    Physical Exam:   Constitutional: Awake, alert   HENT: Atraumatic, Normocephalic   Respiratory: Nonlabored respirations    Cardiovascular: Intact peripheral pulses    Musculoskeletal:     Left lower extremity: Shortening of the left hip with mild external rotation deformity.  No wounds.  Upper and lower leg compartments are soft.  Calf nontender.  Distal neurovascular intact.     Imaging:  Imaging Results (Last 24 Hours)       Procedure Component Value Units Date/Time    CT Lower Extremity Left Without Contrast [[id number]] Collected: 05/09/24 2201     Updated: 05/09/24 2221    Narrative:      CT LOWER EXTREMITY LEFT WO CONTRAST-     Date of Exam: 5/9/2024 8:30 PM     Indication: hip fx; S72.002A-Fracture of unspecified part of neck of  left femur, initial encounter for closed fracture; S72.002A-Fracture of  unspecified part of neck of left femur, initial encounter for closed  fracture.     Comparison: CT pelvis dated 4/11/2023     Technique: Axial CT images were obtained of the left lower extremity  without contrast  administration.  Reconstructed coronal and sagittal  images were also obtained. Automated exposure control and iterative  construction methods were used.        Findings:  There is a comminuted, intra-articular fracture of the left femoral  head. The femoral head appears smaller than typical. This may be  secondary to mechanical erosion of the femoral head within the  acetabulum. There is a vertically oriented fracture line extending into  the femoral neck laterally. Superior to this, there is a rounded defect  within the lateral femoral neck suspected to represent a mechanical  erosion secondary to articulation with the acetabular rim. The femoral  neck/shaft is displaced superiorly in relation to the femoral head with  the lateral femoral neck abutting the lateral acetabular rim. There is a  small mechanical erosion involving the anterior acetabular roof  measuring 0.7 cm transverse. Multiple ossific fragments/loose bodies are  noted within the hip joint measuring up to 1.1 cm. There is complete  loss of joint space superiorly.     Severe degenerative disc changes are noted at L5-S1.     Visualized pelvic viscera appear normal.       Impression:      Impression:  1.  Comminuted, intra-articular fracture of the left femoral head which  is suspected to be subacute to chronic in age. It is new since the  pelvis radiograph dated 12/21/2023. No significant callus formation is  noted.  2.  There are suspected mechanical erosions of the femoral head, femoral  neck and anterior acetabular rim, as described above.  3.  Superior migration of the femoral neck with pseudoarticulation of  the femoral neck and lateral acetabular rim.  4.  Complete loss of the superior joint space  5.  Severe degenerative disc disease at L5-S1.  6.  Numerous foci of abnormal mineralization in the left hip likely  related to the previously described mechanical erosion. Larger loose  bodies around the joint measuring up to 1.1 cm are also noted.         Electronically Signed By-Ibrahima Rose MD On:5/9/2024 10:19 PM       XR Hip With or Without Pelvis 2 - 3 View Left [[id number]] Collected: 05/09/24 1935     Updated: 05/09/24 1945    Narrative:      XR HIP W OR WO PELVIS 2-3 VIEW LEFT-     Date of Exam: 5/9/2024 6:34 PM     Indication: pain     Comparison: 2 view right hip dated 2/22/2024     Findings:  There has been a previous fracture of the left subcapital femoral neck  with some extension into the femoral head. With relation to the  acetabulum and femoral head, the femoral neck is displaced superiorly  approximately 1.9 cm. The fracture line is vaguely seen.     There is complete loss of the joint space in the superior left hip  joint.                 There are mild degenerative changes of the right hip joint. Osseous  structures otherwise appear unremarkable . Moderate to severe  degenerative disc changes are noted in the lower lumbar spine.       Impression:      Impression:  1.  Moderate deformity of the left femoral head and neck favored to be  secondary to a subcapital femoral neck fracture with some extension into  the femoral head. There is resulting malalignment of the femoral head  and neck, as discussed above. The vagueness of the fracture line suggest  the possibility that the fracture is subacute. It is new since  12/21/2023.        Electronically Signed By-Ibrahima Rose MD On:5/9/2024 7:42 PM       XR Chest 1 View [[id number]] Collected: 05/09/24 1844     Updated: 05/09/24 1848    Narrative:      XR CHEST 1 VW-     Date of Exam: 5/9/2024 5:43 PM     Indication: pre op     Comparison: Chest AP dated 11/27/2022     Findings:  The lungs are clear bilaterally. The cardiac and mediastinal silhouettes  appear normal. No effusion is seen.       Impression:      Impression:  1.  No acute cardiopulmonary disease.        Electronically Signed By-Ibrahima Rose MD On:5/9/2024 6:46 PM                Result Review    Result Review:  I have personally  reviewed the results from the time of this admission to 5/10/2024 07:32 EDT and agree with these findings:  [x]  Laboratory  []  Microbiology  [x]  Radiology  []  EKG/Telemetry   []  Cardiology/Vascular   []  Pathology  []  Old records  []  Other:      Assessment & Plan   Assessment / Plan     Brief Patient Summary:  Laura Cardenas is a 83 y.o. female with left femoral head collapse and early acetabular bone loss.  No blood thinning medications at baseline.    Active Hospital Problems:  Active Hospital Problems    Diagnosis     **Hip fx, left, closed, initial encounter     Closed fracture of left hip      Plan:   We discussed her diagnosis and treatment options.  We discussed both operative and nonoperative management.  We discussed the risk, benefits, indications, and alternatives to left total hip arthroplasty versus possible hip hemiarthroplasty.  We discussed primary benefits of surgery including pain relief.  We discussed surgery risk including bleeding, infection, damage nerves and blood vessels, hardware complications, fracture, loosening, instability, leg length discrepancy, persistent pain, functional limitation, anesthesia risk including mortality, DVT/PE, and need for additional procedures.  She elected to proceed with surgical management.    NPO  Bedrest  Pain control  Preoperative antibiotics  Please hold DVT chemoprophylaxis  Further care per primary team, appreciate assistance      Electronically signed by Ivan Cruz MD, 05/10/24, 7:32 AM EDT.

## 2024-05-10 NOTE — SIGNIFICANT NOTE
05/10/24 0611   OTHER   Discipline occupational therapist   Rehab Time/Intention   Session Not Performed   (scheduled for ortho surgery today)

## 2024-05-10 NOTE — PROGRESS NOTES
[facility]   Hospitalist Progress Note  Date: 5/10/2024  Patient Name: Laura Cardenas  : 1940  MRN: [id number]  Date of admission: 2024      Subjective   Subjective     Chief complaint: Hip pain    Summary:  83-year-old female hospitalized after she fell and broke her hip, left femoral head and neck fracture, orthopedics consulted, plan for surgical intervention    Interval follow-up: Seen and examined, no acute distress, no acute major night events, doing better, pain somewhat on tolerated, having to advance and increased pain controlling medications  No fevers, chills, sweats.    Review of systems:  All systems reviewed and negative except for left hip pain    Objective   Objective     Vitals:   Temp:  [97.3 °F (36.3 °C)-99 °F (37.2 °C)] 99 °F (37.2 °C)  Heart Rate:  [63-83] 78  Resp:  [16-19] 16  BP: (102-183)/(53-82) 140/53  Physical Exam               Constitutional: Awake, alert, no acute distress              Eyes: Pupils equal, sclerae anicteric, no conjunctival injection              HENT: NCAT, mucous membranes moist              Neck: Supple, no thyromegaly, no lymphadenopathy, trachea midline              Respiratory: Clear to auscultation bilaterally, nonlabored respirations               Cardiovascular: RRR, no murmurs, rubs, or gallops, palpable pedal pulses bilaterally              Gastrointestinal: Positive bowel sounds, soft, nontender, nondistended              Musculoskeletal: Limited left hip range of motion              Psychiatric: Appropriate affect, cooperative              Neurologic: Oriented x 3, strength symmetric in all extremities, Cranial Nerves grossly intact to confrontation, speech clear              Skin: No rashes     Result Review    Result Review:  I have personally reviewed the pertinent results from the past 24 hours to 5/10/2024 15:54 EDT and agree with these findings:  [x]  Laboratory   CBC          3/19/2024    08:30 2024    18:11 5/10/2024    02:59    CBC   WBC 6.18  11.83  10.18    RBC 4.09  4.51  4.27    Hemoglobin 12.7  13.9  13.0    Hematocrit 37.6  41.2  39.5    MCV 91.9  91.4  92.5    MCH 31.1  30.8  30.4    MCHC 33.8  33.7  32.9    RDW 12.5  12.8  12.9    Platelets 248  287  267      BMP          3/19/2024    08:30 5/9/2024    18:11 5/10/2024    02:59   BMP   BUN 23  22  23    Creatinine 0.78  0.76  0.70    Sodium 140  140  137    Potassium 4.4  3.7  4.2    Chloride 107  103  104    CO2 23.3  25.1  26.0    Calcium 9.5  9.7  9.4      LIVER FUNCTION TESTS:      Lab 05/09/24  1811   TOTAL PROTEIN 7.1   ALBUMIN 4.1   GLOBULIN 3.0   ALT (SGPT) 14   AST (SGOT) 17   BILIRUBIN 0.4   ALK PHOS 99       [x]  Microbiology   Microbiology Results (last 10 days)       ** No results found for the last 240 hours. **              [x]  Radiology CT Lower Extremity Left Without Contrast    Result Date: 5/9/2024  Impression: 1.  Comminuted, intra-articular fracture of the left femoral head which is suspected to be subacute to chronic in age. It is new since the pelvis radiograph dated 12/21/2023. No significant callus formation is noted. 2.  There are suspected mechanical erosions of the femoral head, femoral neck and anterior acetabular rim, as described above. 3.  Superior migration of the femoral neck with pseudoarticulation of the femoral neck and lateral acetabular rim. 4.  Complete loss of the superior joint space 5.  Severe degenerative disc disease at L5-S1. 6.  Numerous foci of abnormal mineralization in the left hip likely related to the previously described mechanical erosion. Larger loose bodies around the joint measuring up to 1.1 cm are also noted.   Electronically Signed By-Ibrahima Rose MD On:5/9/2024 10:19 PM      XR Hip With or Without Pelvis 2 - 3 View Left    Result Date: 5/9/2024  Impression: 1.  Moderate deformity of the left femoral head and neck favored to be secondary to a subcapital femoral neck fracture with some extension into the femoral head.  There is resulting malalignment of the femoral head and neck, as discussed above. The vagueness of the fracture line suggest the possibility that the fracture is subacute. It is new since 12/21/2023.   Electronically Signed By-Ibrahima Rose MD On:5/9/2024 7:42 PM      XR Chest 1 View    Result Date: 5/9/2024  Impression: 1.  No acute cardiopulmonary disease.   Electronically Signed By-Ibrahima Rose MD On:5/9/2024 6:46 PM         []  EKG/Telemetry   No orders to display       []  Cardiology/Vascular   []  Pathology  [x]  Old records  []  Other:    Assessment & Plan   Assessment / Plan     Assessment/Plan:  Assessment:  Left femoral head and neck fracture likely secondary to traumatic fall with underlying osteoporosis  Chronic left lumbar radiculopathy  History of osteoporosis  Prediabetes  Dyslipidemia  Mixed stress and urge urinary incontinence     Plan:  Labs and imaging reviewed  N.p.o. for orthopedic evaluation and surgical management  Insulin sliding scale coverage continue  Continue montelukast 10 mg nightly  Continue Lexapro 10 mg daily  Continue Neurontin 300 mg twice a day  Continue Ditropan daily  Pain control with morphine  A.m. labs  Full code  DVT prophylaxis with heparin  Clinical course dictate further management  Discussed with nurse at bedside    DVT prophylaxis:  Medical DVT prophylaxis orders are present.        CODE STATUS:   Level Of Support Discussed With: Patient  Code Status (Patient has no pulse and is not breathing): CPR (Attempt to Resuscitate)  Medical Interventions (Patient has pulse or is breathing): Full Support        Electronically signed by Edmond Sheppard MD, 5/10/2024, 15:54 EDT.    Portions of this documentation were transcribed electronically from a voice recognition software.  I confirm all data accurately represents the service(s) I performed at today's visit.

## 2024-05-11 LAB
ALBUMIN SERPL-MCNC: 3 G/DL (ref 3.5–5.2)
ALP SERPL-CCNC: 76 U/L (ref 39–117)
ALT SERPL W P-5'-P-CCNC: 11 U/L (ref 1–33)
ANION GAP SERPL CALCULATED.3IONS-SCNC: 7.8 MMOL/L (ref 5–15)
AST SERPL-CCNC: 17 U/L (ref 1–32)
BASOPHILS # BLD AUTO: 0.01 10*3/MM3 (ref 0–0.2)
BASOPHILS NFR BLD AUTO: 0.1 % (ref 0–1.5)
BILIRUB CONJ SERPL-MCNC: <0.2 MG/DL (ref 0–0.3)
BILIRUB INDIRECT SERPL-MCNC: ABNORMAL MG/DL
BILIRUB SERPL-MCNC: 0.3 MG/DL (ref 0–1.2)
BUN SERPL-MCNC: 21 MG/DL (ref 8–23)
BUN/CREAT SERPL: 24.1 (ref 7–25)
CALCIUM SPEC-SCNC: 8.4 MG/DL (ref 8.6–10.5)
CHLORIDE SERPL-SCNC: 103 MMOL/L (ref 98–107)
CO2 SERPL-SCNC: 25.2 MMOL/L (ref 22–29)
CREAT SERPL-MCNC: 0.87 MG/DL (ref 0.57–1)
DEPRECATED RDW RBC AUTO: 43.6 FL (ref 37–54)
EGFRCR SERPLBLD CKD-EPI 2021: 67 ML/MIN/1.73
EOSINOPHIL # BLD AUTO: 0 10*3/MM3 (ref 0–0.4)
EOSINOPHIL NFR BLD AUTO: 0 % (ref 0.3–6.2)
ERYTHROCYTE [DISTWIDTH] IN BLOOD BY AUTOMATED COUNT: 12.8 % (ref 12.3–15.4)
GLUCOSE BLDC GLUCOMTR-MCNC: 131 MG/DL (ref 70–99)
GLUCOSE BLDC GLUCOMTR-MCNC: 137 MG/DL (ref 70–99)
GLUCOSE BLDC GLUCOMTR-MCNC: 145 MG/DL (ref 70–99)
GLUCOSE BLDC GLUCOMTR-MCNC: 148 MG/DL (ref 70–99)
GLUCOSE SERPL-MCNC: 163 MG/DL (ref 65–99)
HCT VFR BLD AUTO: 31.7 % (ref 34–46.6)
HGB BLD-MCNC: 10.1 G/DL (ref 12–15.9)
IMM GRANULOCYTES # BLD AUTO: 0.05 10*3/MM3 (ref 0–0.05)
IMM GRANULOCYTES NFR BLD AUTO: 0.5 % (ref 0–0.5)
LYMPHOCYTES # BLD AUTO: 0.74 10*3/MM3 (ref 0.7–3.1)
LYMPHOCYTES NFR BLD AUTO: 7.8 % (ref 19.6–45.3)
MAGNESIUM SERPL-MCNC: 1.9 MG/DL (ref 1.6–2.4)
MCH RBC QN AUTO: 30 PG (ref 26.6–33)
MCHC RBC AUTO-ENTMCNC: 31.9 G/DL (ref 31.5–35.7)
MCV RBC AUTO: 94.1 FL (ref 79–97)
MONOCYTES # BLD AUTO: 0.69 10*3/MM3 (ref 0.1–0.9)
MONOCYTES NFR BLD AUTO: 7.3 % (ref 5–12)
NEUTROPHILS NFR BLD AUTO: 8.01 10*3/MM3 (ref 1.7–7)
NEUTROPHILS NFR BLD AUTO: 84.3 % (ref 42.7–76)
NRBC BLD AUTO-RTO: 0 /100 WBC (ref 0–0.2)
PHOSPHATE SERPL-MCNC: 4.8 MG/DL (ref 2.5–4.5)
PLATELET # BLD AUTO: 195 10*3/MM3 (ref 140–450)
PMV BLD AUTO: 9.4 FL (ref 6–12)
POTASSIUM SERPL-SCNC: 4.5 MMOL/L (ref 3.5–5.2)
PROT SERPL-MCNC: 5.4 G/DL (ref 6–8.5)
RBC # BLD AUTO: 3.37 10*6/MM3 (ref 3.77–5.28)
SODIUM SERPL-SCNC: 136 MMOL/L (ref 136–145)
WBC NRBC COR # BLD AUTO: 9.5 10*3/MM3 (ref 3.4–10.8)

## 2024-05-11 PROCEDURE — 94799 UNLISTED PULMONARY SVC/PX: CPT

## 2024-05-11 PROCEDURE — 82948 REAGENT STRIP/BLOOD GLUCOSE: CPT | Performed by: STUDENT IN AN ORGANIZED HEALTH CARE EDUCATION/TRAINING PROGRAM

## 2024-05-11 PROCEDURE — 84100 ASSAY OF PHOSPHORUS: CPT | Performed by: STUDENT IN AN ORGANIZED HEALTH CARE EDUCATION/TRAINING PROGRAM

## 2024-05-11 PROCEDURE — 97165 OT EVAL LOW COMPLEX 30 MIN: CPT

## 2024-05-11 PROCEDURE — 83735 ASSAY OF MAGNESIUM: CPT | Performed by: STUDENT IN AN ORGANIZED HEALTH CARE EDUCATION/TRAINING PROGRAM

## 2024-05-11 PROCEDURE — 25810000003 LACTATED RINGERS PER 1000 ML: Performed by: STUDENT IN AN ORGANIZED HEALTH CARE EDUCATION/TRAINING PROGRAM

## 2024-05-11 PROCEDURE — 25010000002 CEFAZOLIN PER 500 MG: Performed by: STUDENT IN AN ORGANIZED HEALTH CARE EDUCATION/TRAINING PROGRAM

## 2024-05-11 PROCEDURE — 99024 POSTOP FOLLOW-UP VISIT: CPT | Performed by: STUDENT IN AN ORGANIZED HEALTH CARE EDUCATION/TRAINING PROGRAM

## 2024-05-11 PROCEDURE — 85025 COMPLETE CBC W/AUTO DIFF WBC: CPT | Performed by: STUDENT IN AN ORGANIZED HEALTH CARE EDUCATION/TRAINING PROGRAM

## 2024-05-11 PROCEDURE — 80048 BASIC METABOLIC PNL TOTAL CA: CPT | Performed by: STUDENT IN AN ORGANIZED HEALTH CARE EDUCATION/TRAINING PROGRAM

## 2024-05-11 PROCEDURE — 80076 HEPATIC FUNCTION PANEL: CPT | Performed by: STUDENT IN AN ORGANIZED HEALTH CARE EDUCATION/TRAINING PROGRAM

## 2024-05-11 PROCEDURE — 25010000002 KETOROLAC TROMETHAMINE PER 15 MG: Performed by: STUDENT IN AN ORGANIZED HEALTH CARE EDUCATION/TRAINING PROGRAM

## 2024-05-11 PROCEDURE — 97161 PT EVAL LOW COMPLEX 20 MIN: CPT

## 2024-05-11 PROCEDURE — 82948 REAGENT STRIP/BLOOD GLUCOSE: CPT

## 2024-05-11 RX ADMIN — Medication 10 ML: at 10:24

## 2024-05-11 RX ADMIN — OXYBUTYNIN CHLORIDE 10 MG: 5 TABLET, EXTENDED RELEASE ORAL at 08:07

## 2024-05-11 RX ADMIN — PANTOPRAZOLE SODIUM 40 MG: 40 TABLET, DELAYED RELEASE ORAL at 05:07

## 2024-05-11 RX ADMIN — ACETAMINOPHEN 1000 MG: 500 TABLET ORAL at 05:15

## 2024-05-11 RX ADMIN — ACETAMINOPHEN 1000 MG: 500 TABLET ORAL at 14:48

## 2024-05-11 RX ADMIN — FERROUS SULFATE TAB 325 MG (65 MG ELEMENTAL FE) 325 MG: 325 (65 FE) TAB at 08:07

## 2024-05-11 RX ADMIN — KETOROLAC TROMETHAMINE 15 MG: 30 INJECTION, SOLUTION INTRAMUSCULAR; INTRAVENOUS at 16:34

## 2024-05-11 RX ADMIN — GABAPENTIN 300 MG: 300 CAPSULE ORAL at 20:42

## 2024-05-11 RX ADMIN — ACETAMINOPHEN 1000 MG: 500 TABLET ORAL at 22:00

## 2024-05-11 RX ADMIN — KETOROLAC TROMETHAMINE 15 MG: 30 INJECTION, SOLUTION INTRAMUSCULAR; INTRAVENOUS at 05:06

## 2024-05-11 RX ADMIN — MONTELUKAST 10 MG: 10 TABLET, FILM COATED ORAL at 20:42

## 2024-05-11 RX ADMIN — SODIUM CHLORIDE 2000 MG: 9 INJECTION, SOLUTION INTRAVENOUS at 01:49

## 2024-05-11 RX ADMIN — KETOROLAC TROMETHAMINE 15 MG: 30 INJECTION, SOLUTION INTRAMUSCULAR; INTRAVENOUS at 10:23

## 2024-05-11 RX ADMIN — Medication 10 ML: at 20:43

## 2024-05-11 RX ADMIN — Medication 10 ML: at 16:35

## 2024-05-11 RX ADMIN — SENNOSIDES AND DOCUSATE SODIUM 2 TABLET: 50; 8.6 TABLET ORAL at 20:42

## 2024-05-11 RX ADMIN — Medication 600 MG: at 08:07

## 2024-05-11 RX ADMIN — POLYETHYLENE GLYCOL 3350 17 G: 17 POWDER, FOR SOLUTION ORAL at 08:08

## 2024-05-11 RX ADMIN — ASPIRIN 325 MG: 325 TABLET ORAL at 20:42

## 2024-05-11 RX ADMIN — SODIUM CHLORIDE, POTASSIUM CHLORIDE, SODIUM LACTATE AND CALCIUM CHLORIDE 100 ML/HR: 600; 310; 30; 20 INJECTION, SOLUTION INTRAVENOUS at 08:09

## 2024-05-11 RX ADMIN — PRAVASTATIN SODIUM 40 MG: 20 TABLET ORAL at 20:42

## 2024-05-11 RX ADMIN — GABAPENTIN 300 MG: 300 CAPSULE ORAL at 08:07

## 2024-05-11 RX ADMIN — SODIUM CHLORIDE 2000 MG: 9 INJECTION, SOLUTION INTRAVENOUS at 10:23

## 2024-05-11 RX ADMIN — ASPIRIN 325 MG: 325 TABLET ORAL at 08:07

## 2024-05-11 RX ADMIN — ESCITALOPRAM OXALATE 10 MG: 10 TABLET ORAL at 08:07

## 2024-05-11 RX ADMIN — Medication 10 ML: at 08:08

## 2024-05-11 RX ADMIN — SENNOSIDES AND DOCUSATE SODIUM 2 TABLET: 50; 8.6 TABLET ORAL at 08:06

## 2024-05-11 NOTE — PLAN OF CARE
Goal Outcome Evaluation:    AAO X4. TOLERATING DIET, ROOM AIR, & ACTIVITY WITH ASSIST S/P SURGERY.

## 2024-05-11 NOTE — PLAN OF CARE
Goal Outcome Evaluation:  Plan of Care Reviewed With: patient        Progress: no change  Outcome Evaluation: Patient presents with balance and endurance limitations that impede his/her ability to perform ADLS. The skills of a therapist are necessary to maximize independence with ADLs.      Anticipated Discharge Disposition (OT): inpatient rehabilitation facility, skilled nursing facility

## 2024-05-11 NOTE — PLAN OF CARE
Goal Outcome Evaluation:  Plan of Care Reviewed With: patient           Outcome Evaluation: Pt presents with decreased ambulatory function due to LLE weakness secondary to surgery. Pt will benefit from skilled physical therapy to address deficits and improve independence.      Anticipated Discharge Disposition (PT): home with home health

## 2024-05-11 NOTE — PROGRESS NOTES
[facility]   Hospitalist Progress Note  Date: 2024  Patient Name: Laura Cardenas  : 1940  MRN: [id number]  Date of admission: 2024      Subjective   Subjective     Chief complaint: Hip pain    Summary:  83-year-old female hospitalized after she fell and broke her hip, left femoral head and neck fracture, orthopedics consulted, plan for surgical intervention, status post left total hip arthroplasty    Interval follow-up: Seen and examined, no acute distress, no acute major night events, pain is tolerable, she is asking about when she can  the shower.  No fevers, chills, sweats.  Hemoglobin 10.1, white blood cell count 9000, blood sugar in the 100 range, potassium 4.5, sodium 136.    Review of systems:  All systems reviewed and negative except for left hip pain intermittently, generalized fatigue and weakness    Objective   Objective     Vitals:   Temp:  [97.3 °F (36.3 °C)-99 °F (37.2 °C)] 97.3 °F (36.3 °C)  Heart Rate:  [] 85  Resp:  [16-22] 18  BP: ()/(44-66) 101/50  Flow (L/min):  [2-3] 2  Physical Exam               Constitutional: Awake, alert, no acute distress              Eyes: Pupils equal, sclerae anicteric, no conjunctival injection              HENT: NCAT, mucous membranes moist              Neck: Supple, full range of motion              Respiratory: Clear to auscultation bilaterally, nonlabored respirations               Cardiovascular: RRR, no murmurs, rubs, or gallops, palpable pedal pulses bilaterally              Gastrointestinal: Positive bowel sounds, soft, nontender, nondistended              Musculoskeletal: Bilateral lower extremities equal length, distal extremities neurovascular intact, no edema              Psychiatric: Appropriate affect, cooperative              Neurologic: Oriented x 3, strength symmetric in all extremities, Cranial Nerves grossly intact to confrontation, speech clear              Skin: No rashes     Result Review    Result  Review:  I have personally reviewed the pertinent results from the past 24 hours to 5/11/2024 11:43 EDT and agree with these findings:  [x]  Laboratory   CBC          5/9/2024    18:11 5/10/2024    02:59 5/11/2024    03:10   CBC   WBC 11.83  10.18  9.50    RBC 4.51  4.27  3.37    Hemoglobin 13.9  13.0  10.1    Hematocrit 41.2  39.5  31.7    MCV 91.4  92.5  94.1    MCH 30.8  30.4  30.0    MCHC 33.7  32.9  31.9    RDW 12.8  12.9  12.8    Platelets 287  267  195      BMP          5/9/2024    18:11 5/10/2024    02:59 5/11/2024    03:10   BMP   BUN 22  23  21    Creatinine 0.76  0.70  0.87    Sodium 140  137  136    Potassium 3.7  4.2  4.5    Chloride 103  104  103    CO2 25.1  26.0  25.2    Calcium 9.7  9.4  8.4      LIVER FUNCTION TESTS:      Lab 05/11/24  0310 05/09/24  1811   TOTAL PROTEIN 5.4* 7.1   ALBUMIN 3.0* 4.1   GLOBULIN  --  3.0   ALT (SGPT) 11 14   AST (SGOT) 17 17   BILIRUBIN 0.3 0.4   BILIRUBIN DIRECT <0.2  --    ALK PHOS 76 99       [x]  Microbiology   Microbiology Results (last 10 days)       ** No results found for the last 240 hours. **              [x]  Radiology XR Hip With or Without Pelvis 2 - 3 View Left    Result Date: 5/10/2024  Impression: 1.  Status post left total hip arthroplasty without evidence of complication   Electronically Signed By-Ibrahima Rose MD On:5/10/2024 9:04 PM      CT Lower Extremity Left Without Contrast    Result Date: 5/9/2024  Impression: 1.  Comminuted, intra-articular fracture of the left femoral head which is suspected to be subacute to chronic in age. It is new since the pelvis radiograph dated 12/21/2023. No significant callus formation is noted. 2.  There are suspected mechanical erosions of the femoral head, femoral neck and anterior acetabular rim, as described above. 3.  Superior migration of the femoral neck with pseudoarticulation of the femoral neck and lateral acetabular rim. 4.  Complete loss of the superior joint space 5.  Severe degenerative disc disease  at L5-S1. 6.  Numerous foci of abnormal mineralization in the left hip likely related to the previously described mechanical erosion. Larger loose bodies around the joint measuring up to 1.1 cm are also noted.   Electronically Signed By-Ibrahima Rose MD On:5/9/2024 10:19 PM      XR Hip With or Without Pelvis 2 - 3 View Left    Result Date: 5/9/2024  Impression: 1.  Moderate deformity of the left femoral head and neck favored to be secondary to a subcapital femoral neck fracture with some extension into the femoral head. There is resulting malalignment of the femoral head and neck, as discussed above. The vagueness of the fracture line suggest the possibility that the fracture is subacute. It is new since 12/21/2023.   Electronically Signed By-Ibrahima Rose MD On:5/9/2024 7:42 PM      XR Chest 1 View    Result Date: 5/9/2024  Impression: 1.  No acute cardiopulmonary disease.   Electronically Signed ByKvng Rose MD On:5/9/2024 6:46 PM         []  EKG/Telemetry   No orders to display       []  Cardiology/Vascular   []  Pathology  [x]  Old records  []  Other:    Assessment & Plan   Assessment / Plan     Assessment/Plan:  Assessment:  Left femoral head and neck fracture likely secondary to traumatic fall with underlying osteoporosis  Chronic left lumbar radiculopathy  History of osteoporosis  Prediabetes  Dyslipidemia  Mixed stress and urge urinary incontinence     Plan:  Labs and imaging reviewed  PT/OT  Stop IV fluids  Insulin sliding scale coverage continue  Continue montelukast 10 mg nightly  Continue Lexapro 10 mg daily  Continue Neurontin 300 mg twice a day  Continue Ditropan daily  Pain control with morphine  A.m. labs  Full code  DVT prophylaxis with heparin  Clinical course dictate further management  Discussed with nurse at bedside  DVT prophylaxis:  Mechanical DVT prophylaxis orders are present.        CODE STATUS:   Level Of Support Discussed With: Patient  Code Status (Patient has no pulse and is not  breathing): CPR (Attempt to Resuscitate)  Medical Interventions (Patient has pulse or is breathing): Full Support        Electronically signed by Edmond Sheppard MD, 5/11/2024, 11:43 EDT.    Portions of this documentation were transcribed electronically from a voice recognition software.  I confirm all data accurately represents the service(s) I performed at today's visit.

## 2024-05-11 NOTE — PLAN OF CARE
Goal Outcome Evaluation:      No acute events overnight, VSS, Patient has IVF infusing, obregon in place. Patient denies pain at this time. Patient did not voice any concerns

## 2024-05-11 NOTE — THERAPY EVALUATION
Acute Care - Physical Therapy Initial Evaluation   [place]     Patient Name: Laura Cardenas  : 1940  MRN: [id number]  Today's Date: 2024      Visit Dx:     ICD-10-CM ICD-9-CM   1. Closed fracture of left hip, initial encounter  S72.002A 820.8   2. Closed fracture of neck of left femur, initial encounter  S72.002A 820.8   3. Impaired mobility and ADLs  Z74.09 V49.89    Z78.9    4. Difficulty walking  R26.2 719.7     Patient Active Problem List   Diagnosis    Gastro-esophageal reflux disease without esophagitis    Mixed hyperlipidemia    Impaired fasting glucose    Vitamin D deficiency    Primary osteoarthritis involving multiple joints    Age-related osteoporosis without current pathological fracture    Suprapubic fullness    Medicare annual wellness visit, subsequent    Mixed stress and urge urinary incontinence    Non-seasonal allergic rhinitis due to pollen    History of DVT of lower extremity    Left lumbar radiculopathy    Hip fx, left, closed, initial encounter    Closed fracture of left hip     Past Medical History:   Diagnosis Date    Acute embolism and thrombosis of deep vein of left lower extremity     Age-related osteoporosis without current pathological fracture     Disorder of bone, unspecified     Diverticulosis     DJD (degenerative joint disease)     GERD without esophagitis     History of transfusion     59 years ago for childbirth    Mixed hyperlipidemia     Primary generalized (osteo)arthritis     Spinal headache      Past Surgical History:   Procedure Laterality Date    BACK SURGERY      BACK SURGERY      S/P Lumbar    BREAST BIOPSY      Breast Bx (83 & 89)    CATARACT EXTRACTION      2015 L Cataract 2015 R Cataract    COLONOSCOPY  2017    DR. AWAD    COLONOSCOPY N/A 2021    Procedure: COLONOSCOPY, WITH HOT SNARE POLYPECTOMY,;  Surgeon: Tony Thomas MD;  Location: [facility] ENDOSCOPY;  Service: Gastroenterology;  Laterality: N/A;  DIVERTICULOSIS, COLON  POLYP    REPLACEMENT TOTAL KNEE Bilateral     04/10/2019 right knee replacement 01/2020 knee replacement left    TONSILLECTOMY      10 YEARS OLD     PT Assessment (Last 12 Hours)       PT Evaluation and Treatment       Row Name 05/11/24 1431          Physical Therapy Time and Intention    Subjective Information no complaints  -     Document Type evaluation  -     Mode of Treatment individual therapy;physical therapy  -     Patient Effort good  -     Symptoms Noted During/After Treatment none  -JH       Row Name 05/11/24 1431          General Information    Patient Profile Reviewed yes  -     Patient Observations alert;cooperative;agree to therapy  -     Prior Level of Function independent:;all household mobility;transfer  -     Barriers to Rehab none identified  -JH       Row Name 05/11/24 1431          Living Environment    Current Living Arrangements home  -     People in Home spouse  -     Primary Care Provided by self  -JH       Row Name 05/11/24 1431          Home Use of Assistive/Adaptive Equipment    Equipment Currently Used at Home cane, quad tip;wheelchair  -JH       Row Name 05/11/24 1431          Range of Motion (ROM)    Range of Motion bilateral lower extremities;ROM is [facility] 05/11/24 1431          Strength (Manual Muscle Testing)    Strength (Manual Muscle Testing) bilateral lower extremities;strength is [facility] 05/11/24 1431          Transfers    Transfers stand-sit transfer;sit-stand transfer  -JH       Row Name 05/11/24 1431          Sit-Stand Transfer    Sit-Stand [place] (Transfers) contact guard  -JH     Assistive Device (Sit-Stand Transfers) walker, front-wheeled  -JH       Row Name 05/11/24 1431          Stand-Sit Transfer    Stand-Sit [place] (Transfers) contact guard  -JH     Assistive Device (Stand-Sit Transfers) walker, front-wheeled  -JH       Row Name 05/11/24 1431          Gait/Stairs (Locomotion)    Gait/Stairs Locomotion  gait/ambulation assistive device  -     [place] Level (Gait) contact guard  -     Assistive Device (Gait) walker, front-wheeled  -     Patient was able to Ambulate yes  -     Distance in Feet (Gait) 250  -     Pattern (Gait) step-through  -       Row Name 05/11/24 1431          Safety Issues, Functional Mobility    Impairments Affecting Function (Mobility) balance;endurance/activity tolerance;pain  -       Row Name 05/11/24 1431          Balance    Balance Assessment standing dynamic balance  -     Dynamic Standing Balance contact guard;supervision  -     Position/Device Used, Standing Balance walker, front-wheeled  -       Row Name             Wound 05/10/24 1832 Left anterior greater trochanter Incision    Wound - Properties Group Placement Date: 05/10/24  -HK Placement Time: 1832  -HK Side: Left  -HK Orientation: anterior  -HK Location: greater trochanter  -HK Primary Wound Type: Incision  -HK    Retired Wound - Properties Group Placement Date: 05/10/24  -HK Placement Time: 1832  -HK Side: Left  -HK Orientation: anterior  -HK Location: greater trochanter  -HK Primary Wound Type: Incision  -HK    Retired Wound - Properties Group Date first assessed: 05/10/24  -HK Time first assessed: 1832  -HK Side: Left  -HK Location: greater trochanter  -HK Primary Wound Type: Incision  -HK      Row Name 05/11/24 1431          Plan of Care Review    Plan of Care Reviewed With patient  -     Outcome Evaluation Pt presents with decreased ambulatory function due to LLE weakness secondary to surgery. Pt will benefit from skilled physical therapy to address deficits and improve independence.  -       Row Name 05/11/24 1431          Positioning and Restraints    Pre-Treatment Position sitting in chair/recliner  -     Post Treatment Position chair  -     In Chair call light within reach;encouraged to call for assist;with nsg  -       Row Name 05/11/24 1431          Therapy Assessment/Plan (PT)     Patient/Family Therapy Goals Statement (PT) Return home  -     Rehab Potential (PT) good, to achieve stated therapy goals  -     Criteria for Skilled Interventions Met (PT) yes;skilled treatment is necessary  -     Therapy Frequency (PT) daily  -     Predicted Duration of Therapy Intervention (PT) 10  -     Problem List (PT) problems related to;balance;mobility;strength;pain  -     Activity Limitations Related to Problem List (PT) unable to ambulate safely;unable to transfer safely  -[facility] Name 05/11/24 1431          Therapy Plan Review/Discharge Plan (PT)    Therapy Plan Review (PT) evaluation/treatment results reviewed;participants voiced agreement with care plan;patient  -[facility] Name 05/11/24 1431          Physical Therapy Goals    Transfer Goal Selection (PT) transfer, PT goal 1  -     Gait Training Goal Selection (PT) gait training, PT goal 1  -[facility] Name 05/11/24 1431          Transfer Goal 1 (PT)    Activity/Assistive Device (Transfer Goal 1, PT) transfers, all;walker, rolling  -     [place] Level/Cues Needed (Transfer Goal 1, PT) modified independence  -JH     Time Frame (Transfer Goal 1, PT) long term goal (LTG);10 days  -[facility] Name 05/11/24 1431          Gait Training Goal 1 (PT)    Activity/Assistive Device (Gait Training Goal 1, PT) gait (walking locomotion);assistive device use;walker, rolling  -     [place] Level (Gait Training Goal 1, PT) modified independence  -     Distance (Gait Training Goal 1, PT) 300  -JH     Time Frame (Gait Training Goal 1, PT) long term goal (LTG);10 days  [facility]               User Key  (r) = Recorded By, (t) = Taken By, (c) = Cosigned By      Initials Name Provider Type    Starr Frost, RN Registered Nurse    Hernandez Sanz, PT Physical Therapist                    Physical Therapy Education       Title: PT OT SLP Therapies (Done)       Topic: Physical Therapy (Done)       Point: Mobility training (Done)        Learning Progress Summary             Patient Acceptance, E, VU by  at 5/11/2024 1436                         Point: Home exercise program (Done)       Learning Progress Summary             Patient Acceptance, E, VU by  at 5/11/2024 1436                         Point: Body mechanics (Done)       Learning Progress Summary             Patient Acceptance, E, VU by  at 5/11/2024 1436                         Point: Precautions (Done)       Learning Progress Summary             Patient Acceptance, E, VU by  at 5/11/2024 1436                                         User Key       Initials Effective Dates Name Provider Type Discipline     05/08/23 -  Hernandez Blount, PT Physical Therapist PT                  PT Recommendation and Plan  Anticipated Discharge Disposition (PT): home with home health  Planned Therapy Interventions (PT): balance training, bed mobility training, gait training, home exercise program, stair training, strengthening, transfer training  Therapy Frequency (PT): daily  Plan of Care Reviewed With: patient  Outcome Evaluation: Pt presents with decreased ambulatory function due to LLE weakness secondary to surgery. Pt will benefit from skilled physical therapy to address deficits and improve independence.   Outcome Measures       Row Name 05/11/24 1400             How much help from another person do you currently need...    Turning from your back to your side while in flat bed without using bedrails? 3  -JH      Moving from lying on back to sitting on the side of a flat bed without bedrails? 3  -JH      Moving to and from a bed to a chair (including a wheelchair)? 3  -JH      Standing up from a chair using your arms (e.g., wheelchair, bedside chair)? 3  -JH      Climbing 3-5 steps with a railing? 2  -JH      To walk in hospital room? 3  -      AM-PAC 6 Clicks Score (PT) 17  -      Highest Level of Mobility Goal 5 --> Static standing  -         Functional Assessment    Outcome Measure Options  AM-PAC 6 Clicks Basic Mobility (PT)  -                User Key  (r) = Recorded By, (t) = Taken By, (c) = Cosigned By      Initials Name Provider Type    Hernandez Sanz PT Physical Therapist                     Time Calculation:    PT Charges       Row Name 05/11/24 1431             Time Calculation    PT Received On 05/11/24  -      PT Goal Re-Cert Due Date 05/20/24  -         Untimed Charges    PT Eval/Re-eval Minutes 35  -JH         Total Minutes    Untimed Charges Total Minutes 35  -JH       Total Minutes 35  -JH                User Key  (r) = Recorded By, (t) = Taken By, (c) = Cosigned By      Initials Name Provider Type    Hernandez Sanz PT Physical Therapist                  Therapy Charges for Today       Code Description Service Date Service Provider Modifiers Qty    [id number] HC PT EVAL LOW COMPLEXITY 3 5/11/2024 Hernandez Blount PT GP 1            PT G-Codes  Outcome Measure Options: AM-PAC 6 Clicks Basic Mobility (PT)  AM-PAC 6 Clicks Score (PT): 17  AM-PAC 6 Clicks Score (OT): 21    Hernandez Blount PT  5/11/2024

## 2024-05-11 NOTE — THERAPY EVALUATION
Patient Name: Laura Cardenas  : 1940    MRN: [id number]                              Today's Date: 2024       Admit Date: 2024    Visit Dx:     ICD-10-CM ICD-9-CM   1. Closed fracture of left hip, initial encounter  S72.002A 820.8   2. Closed fracture of neck of left femur, initial encounter  S72.002A 820.8   3. Impaired mobility and ADLs  Z74.09 V49.89    Z78.9      Patient Active Problem List   Diagnosis    Gastro-esophageal reflux disease without esophagitis    Mixed hyperlipidemia    Impaired fasting glucose    Vitamin D deficiency    Primary osteoarthritis involving multiple joints    Age-related osteoporosis without current pathological fracture    Suprapubic fullness    Medicare annual wellness visit, subsequent    Mixed stress and urge urinary incontinence    Non-seasonal allergic rhinitis due to pollen    History of DVT of lower extremity    Left lumbar radiculopathy    Hip fx, left, closed, initial encounter    Closed fracture of left hip     Past Medical History:   Diagnosis Date    Acute embolism and thrombosis of deep vein of left lower extremity     Age-related osteoporosis without current pathological fracture     Disorder of bone, unspecified     Diverticulosis     DJD (degenerative joint disease)     GERD without esophagitis     History of transfusion     59 years ago for childbirth    Mixed hyperlipidemia     Primary generalized (osteo)arthritis     Spinal headache      Past Surgical History:   Procedure Laterality Date    BACK SURGERY      BACK SURGERY      S/P Lumbar    BREAST BIOPSY      Breast Bx (81 & 98)    CATARACT EXTRACTION      2015 L Cataract 2015 R Cataract    COLONOSCOPY  2017    DR. AWAD    COLONOSCOPY N/A 2021    Procedure: COLONOSCOPY, WITH HOT SNARE POLYPECTOMY,;  Surgeon: Tony Thomas MD;  Location: [facility] ENDOSCOPY;  Service: Gastroenterology;  Laterality: N/A;  DIVERTICULOSIS, COLON POLYP    REPLACEMENT TOTAL KNEE Bilateral      04/10/2019 right knee replacement 01/2020 knee replacement left    TONSILLECTOMY      10 YEARS OLD      General Information       Row Name 05/11/24 0945          OT Time and Intention    Document Type evaluation  -     Mode of Treatment individual therapy;occupational therapy  -       Row Name 05/11/24 0945          General Information    Patient Profile Reviewed yes  -     Prior Level of Function --  Patient reports she required some assist with lower body ADLs prior to hospitalization.  She states she had a tub shower with bench in place and has a raised toilet.  -     Existing Precautions/Restrictions fall;weight bearing  -     Barriers to Rehab none identified  -       Row Name 05/11/24 0945          Occupational Profile    Reason for Services/Referral (Occupational Profile) Pt. is a 83year old female admitted for the above diagnosis status post left total hip replacement on 5/10/2024. Pt. referred to OT services to assess independence with ADLs and adl transfers/fx'l mobility. No previous OT services for current condition.  -       Row Name 05/11/24 0945          Living Environment    People in Home spouse  -       Row Name 05/11/24 0945          Home Main Entrance    Number of Stairs, Main Entrance two  -       Row Name 05/11/24 0945          Cognition    Orientation Status (Cognition) oriented x 4  -       Row Name 05/11/24 0945          Safety Issues, Functional Mobility    Impairments Affecting Function (Mobility) balance;endurance/activity tolerance;pain  -               User Key  (r) = Recorded By, (t) = Taken By, (c) = Cosigned By      Initials Name Provider Type     Bianca Cheema, OT Occupational Therapist                     Mobility/ADL's       Row Name 05/11/24 0932          Bed Mobility    Bed Mobility bed mobility (all) activities  -     All Activities, [place] (Bed Mobility) contact guard;1 person assist  -     Assistive Device (Bed Mobility) head of bed  elevated;bed rails  -[facility] Name 05/11/24 0947          Transfers    Transfers sit-stand transfer;bed-chair transfer  -[facility] Name 05/11/24 0947          Bed-Chair Transfer    Bed-Chair [place] (Transfers) contact guard;1 person assist  -     Assistive Device (Bed-Chair Transfers) walker, front-wheeled  -AC       Row Name 05/11/24 0947          Sit-Stand Transfer    Sit-Stand [place] (Transfers) contact guard;1 person assist  -     Assistive Device (Sit-Stand Transfers) walker, front-wheeled  -       Row Name 05/11/24 0947          Activities of Daily Living    BADL Assessment/Intervention --  Patient is set up for self-feeding, set up for grooming, set up for upper body bathing/dressing, min/mod assist for lower body bathing/dressing, contact-guard assist for toileting.  -               User Key  (r) = Recorded By, (t) = Taken By, (c) = Cosigned By      Initials Name Provider Type     Bianca Cheema OT Occupational Therapist                   Obj/Interventions       [facility] Name 05/11/24 0948          Sensory Assessment (Somatosensory)    Sensory Assessment (Somatosensory) UE sensation intact  -AC       Row Name 05/11/24 0948          Vision Assessment/Intervention    Visual Impairment/Limitations WFL  -[facility] Name 05/11/24 0948          Range of Motion Comprehensive    General Range of Motion bilateral upper extremity ROM WNL  -AC       Row Name 05/11/24 0948          Strength Comprehensive (MMT)    General Manual Muscle Testing (MMT) Assessment no strength deficits identified  -AC       Row Name 05/11/24 0948          Motor Skills    Motor Skills coordination  -     Coordination [facility]  -AC       Row Name 05/11/24 0948          Balance    Balance Assessment standing dynamic balance  -     Dynamic Standing Balance contact guard;verbal cues;1-person assist  -     Position/Device Used, Standing Balance supported;walker, rolling  -AC     Balance Interventions standing;sit to  stand;supported;dynamic;minimal challenge;occupation based/functional task  -AC               User Key  (r) = Recorded By, (t) = Taken By, (c) = Cosigned By      Initials Name Provider Type    Bianca Delgado OT Occupational Therapist                   Goals/Plan       Row Name 05/11/24 0951          Bed Mobility Goal 1 (OT)    Activity/Assistive Device (Bed Mobility Goal 1, OT) bed mobility activities, all  -AC     [place] Level/Cues Needed (Bed Mobility Goal 1, OT) modified independence  -AC     Time Frame (Bed Mobility Goal 1, OT) long term goal (LTG);10 days  -AC       Row Name 05/11/24 0951          Transfer Goal 1 (OT)    Activity/Assistive Device (Transfer Goal 1, OT) transfers, all  -AC     [place] Level/Cues Needed (Transfer Goal 1, OT) modified independence  -AC     Time Frame (Transfer Goal 1, OT) long term goal (LTG);10 days  -       Row Name 05/11/24 0951          Bathing Goal 1 (OT)    Activity/Device (Bathing Goal 1, OT) bathing skills, all  -AC     [place] Level/Cues Needed (Bathing Goal 1, OT) modified independence  -AC     Time Frame (Bathing Goal 1, OT) long term goal (LTG);10 days  -       Row Name 05/11/24 0951          Dressing Goal 1 (OT)    Activity/Device (Dressing Goal 1, OT) dressing skills, all  -AC     [place]/Cues Needed (Dressing Goal 1, OT) modified independence  -AC     Time Frame (Dressing Goal 1, OT) long term goal (LTG);10 days  -       Row Name 05/11/24 0951          Toileting Goal 1 (OT)    Activity/Device (Toileting Goal 1, OT) toileting skills, all  -AC     [place] Level/Cues Needed (Toileting Goal 1, OT) modified independence  -AC     Time Frame (Toileting Goal 1, OT) long term goal (LTG);10 days  -AC       Row Name 05/11/24 0951          Problem Specific Goal 1 (OT)    Problem Specific Goal 1 (OT) Patient will demonstrate good activity tolerance for ADLs.  -AC     Time Frame (Problem Specific Goal 1, OT) long term goal (LTG);10 days  -AC        Row Name 05/11/24 0910          Therapy Assessment/Plan (OT)    Planned Therapy Interventions (OT) activity tolerance training;BADL retraining;functional balance retraining;occupation/activity based interventions;patient/caregiver education/training;ROM/therapeutic exercise;transfer/mobility retraining  -               User Key  (r) = Recorded By, (t) = Taken By, (c) = Cosigned By      Initials Name Provider Type     Bianca Cheema OT Occupational Therapist                   Clinical Impression       Row Name 05/11/24 0974          Pain Assessment    Pretreatment Pain Rating 1/10  -     Posttreatment Pain Rating 1/10  -     Pain Location - Side/Orientation Left  -     Pain Location - hip  -       Row Name 05/11/24 0931          Plan of Care Review    Plan of Care Reviewed With patient  -     Progress no change  -     Outcome Evaluation Patient presents with balance and endurance limitations that impede his/her ability to perform ADLS. The skills of a therapist are necessary to maximize independence with ADLs.  -       Row Name 05/11/24 0975          Therapy Assessment/Plan (OT)    Patient/Family Therapy Goal Statement (OT) Patient would like to maximize independence with adls.  -     Rehab Potential (OT) good, to achieve stated therapy goals  -     Criteria for Skilled Therapeutic Interventions Met (OT) yes;meets criteria;skilled treatment is necessary  -     Therapy Frequency (OT) 5 times/wk  -       Row Name 05/11/24 0970          Therapy Plan Review/Discharge Plan (OT)    Equipment Needs Upon Discharge (OT) walker, rolling;commode chair;tub bench  -     Anticipated Discharge Disposition (OT) inpatient rehabilitation facility;skilled nursing facility  -       Row Name 05/11/24 0996          Positioning and Restraints    Pre-Treatment Position in bed  -     Post Treatment Position chair  -     In Chair call light within reach;encouraged to call for assist;exit alarm on;legs  elevated  -AC               User Key  (r) = Recorded By, (t) = Taken By, (c) = Cosigned By      Initials Name Provider Type    Bianca Delgado OT Occupational Therapist                   Outcome Measures       Row Name 05/11/24 0952          How much help from another is currently needed...    Putting on and taking off regular lower body clothing? 3  -AC     Bathing (including washing, rinsing, and drying) 3  -AC     Toileting (which includes using toilet bed pan or urinal) 3  -AC     Putting on and taking off regular upper body clothing 4  -AC     Taking care of personal grooming (such as brushing teeth) 4  -AC     Eating meals 4  -AC     AM-PAC 6 Clicks Score (OT) 21  -AC       Row Name 05/10/24 4735          How much help from another person do you currently need...    Turning from your back to your side while in flat bed without using bedrails? 3  -TB     Moving from lying on back to sitting on the side of a flat bed without bedrails? 3  -TB     Moving to and from a bed to a chair (including a wheelchair)? 3  -TB     Standing up from a chair using your arms (e.g., wheelchair, bedside chair)? 3  -TB     Climbing 3-5 steps with a railing? 1  -TB     To walk in hospital room? 1  -TB     AM-PAC 6 Clicks Score (PT) 14  -TB     Highest Level of Mobility Goal 4 --> Transfer to chair/commode  -TB       Row Name 05/11/24 0952          Functional Assessment    Outcome Measure Options AM-PAC 6 Clicks Daily Activity (OT);Optimal Instrument  -AC       Row Name 05/11/24 0952          Optimal Instrument    Optimal Instrument Optimal - 3  -AC     Bending/Stooping 2  -AC     Standing 2  -AC     Reaching 1  -AC     From the list, choose the 3 activities you would most like to be able to do without any difficulty Bending/stooping;Standing;Reaching  -AC     Total Score Optimal - 3 5  -AC               User Key  (r) = Recorded By, (t) = Taken By, (c) = Cosigned By      Initials Name Provider Type    Bianca Delgado, OT  Occupational Therapist    Mitzy Quintero, RN Registered Nurse                    Occupational Therapy Education       Title: PT OT SLP Therapies (Done)       Topic: Occupational Therapy (Done)       Point: ADL training (Done)       Description:   Instruct learner(s) on proper safety adaptation and remediation techniques during self care or transfers.   Instruct in proper use of assistive devices.                  Learning Progress Summary             Patient Acceptance, E, VU by  at 5/11/2024 0953                         Point: Home exercise program (Done)       Description:   Instruct learner(s) on appropriate technique for monitoring, assisting and/or progressing therapeutic exercises/activities.                  Learning Progress Summary             Patient Acceptance, E, VU by  at 5/11/2024 0953                         Point: Precautions (Done)       Description:   Instruct learner(s) on prescribed precautions during self-care and functional transfers.                  Learning Progress Summary             Patient Acceptance, E, VU by  at 5/11/2024 0953                         Point: Body mechanics (Done)       Description:   Instruct learner(s) on proper positioning and spine alignment during self-care, functional mobility activities and/or exercises.                  Learning Progress Summary             Patient Acceptance, E, VU by  at 5/11/2024 0953                                         User Key       Initials Effective Dates Name Provider Type Discipline     06/16/21 -  Bianca Cheema OT Occupational Therapist OT                  OT Recommendation and Plan  Planned Therapy Interventions (OT): activity tolerance training, BADL retraining, functional balance retraining, occupation/activity based interventions, patient/caregiver education/training, ROM/therapeutic exercise, transfer/mobility retraining  Therapy Frequency (OT): 5 times/wk  Plan of Care Review  Plan of Care Reviewed With:  patient  Progress: no change  Outcome Evaluation: Patient presents with balance and endurance limitations that impede his/her ability to perform ADLS. The skills of a therapist are necessary to maximize independence with ADLs.     Time Calculation:   Evaluation Complexity (OT)  Review Occupational Profile/Medical/Therapy History Complexity: expanded/moderate complexity  Assessment, Occupational Performance/Identification of Deficit Complexity: 1-3 performance deficits  Clinical Decision Making Complexity (OT): problem focused assessment/low complexity  Overall Complexity of Evaluation (OT): low complexity     Time Calculation- OT       Row Name 05/11/24 0954             Time Calculation- OT    OT Received On 05/11/24  -AC      OT Goal Re-Cert Due Date 05/20/24  -AC         Untimed Charges    OT Eval/Re-eval Minutes 31  -AC         Total Minutes    Untimed Charges Total Minutes 31  -AC       Total Minutes 31  -AC                User Key  (r) = Recorded By, (t) = Taken By, (c) = Cosigned By      Initials Name Provider Type    AC Bianca Cheema OT Occupational Therapist                  Therapy Charges for Today       Code Description Service Date Service Provider Modifiers Qty    [id number]  OT EVAL LOW COMPLEXITY 3 5/11/2024 Bianca Cheema OT GO 1                 Bianca Cheema OT  5/11/2024

## 2024-05-11 NOTE — PROGRESS NOTES
[facility]     Progress Note    Patient Name: Laura Cardenas  : 1940  MRN: [id number]  Primary Care Physician:  Ari Brady MD  Date of admission: 2024    Subjective   Subjective     HPI:  No events overnight.  Patient reports her pain has significantly improved from prior to surgery.  She denies chest pain or shortness of breath.  She has not worked with PT at this time.    Review of Systems   See HPI    Objective   Objective     Vitals:   Temp:  [97.3 °F (36.3 °C)-99 °F (37.2 °C)] 97.3 °F (36.3 °C)  Heart Rate:  [] 82  Resp:  [16-22] 20  BP: ()/(44-66) 98/46  Flow (L/min):  [2-3] 2  Physical Exam    General: Alert, no acute distress   Left lower extremity: Aquacel in place.  No drainage.  Swelling is mild.  Calf nontender.  Leg lengths symmetric.  Distal neurovascular intact.  No pain with logroll.    Result Review      WBC   Date Value Ref Range Status   2024 9.50 3.40 - 10.80 10*3/mm3 Final     RBC   Date Value Ref Range Status   2024 3.37 (L) 3.77 - 5.28 10*6/mm3 Final     Hemoglobin   Date Value Ref Range Status   2024 10.1 (L) 12.0 - 15.9 g/dL Final     Hematocrit   Date Value Ref Range Status   2024 31.7 (L) 34.0 - 46.6 % Final     MCV   Date Value Ref Range Status   2024 94.1 79.0 - 97.0 fL Final     MCH   Date Value Ref Range Status   2024 30.0 26.6 - 33.0 pg Final     MCHC   Date Value Ref Range Status   2024 31.9 31.5 - 35.7 g/dL Final     RDW   Date Value Ref Range Status   2024 12.8 12.3 - 15.4 % Final     RDW-SD   Date Value Ref Range Status   2024 43.6 37.0 - 54.0 fl Final     MPV   Date Value Ref Range Status   2024 9.4 6.0 - 12.0 fL Final     Platelets   Date Value Ref Range Status   2024 195 140 - 450 10*3/mm3 Final     Neutrophil %   Date Value Ref Range Status   2024 84.3 (H) 42.7 - 76.0 % Final     Lymphocyte %   Date Value Ref Range Status   2024 7.8 (L) 19.6 - 45.3 % Final      Monocyte %   Date Value Ref Range Status   05/11/2024 7.3 5.0 - 12.0 % Final     Eosinophil %   Date Value Ref Range Status   05/11/2024 0.0 (L) 0.3 - 6.2 % Final     Basophil %   Date Value Ref Range Status   05/11/2024 0.1 0.0 - 1.5 % Final     Immature Grans %   Date Value Ref Range Status   05/11/2024 0.5 0.0 - 0.5 % Final     Neutrophils, Absolute   Date Value Ref Range Status   05/11/2024 8.01 (H) 1.70 - 7.00 10*3/mm3 Final     Lymphocytes, Absolute   Date Value Ref Range Status   05/11/2024 0.74 0.70 - 3.10 10*3/mm3 Final     Monocytes, Absolute   Date Value Ref Range Status   05/11/2024 0.69 0.10 - 0.90 10*3/mm3 Final     Eosinophils, Absolute   Date Value Ref Range Status   05/11/2024 0.00 0.00 - 0.40 10*3/mm3 Final     Basophils, Absolute   Date Value Ref Range Status   05/11/2024 0.01 0.00 - 0.20 10*3/mm3 Final     Immature Grans, Absolute   Date Value Ref Range Status   05/11/2024 0.05 0.00 - 0.05 10*3/mm3 Final     nRBC   Date Value Ref Range Status   05/11/2024 0.0 0.0 - 0.2 /100 WBC Final        Result Review:  I have personally reviewed the results from the time of this admission to 5/11/2024 06:47 EDT and agree with these findings:  [x]  Laboratory  []  Microbiology  [x]  Radiology  []  EKG/Telemetry   []  Cardiology/Vascular   []  Pathology  []  Old records  []  Other:      Assessment & Plan   Assessment / Plan     Brief Patient Summary:  Laura Cardenas is a 83 y.o. 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  If able to mobilize and medically stable, may be suitable for discharge home  tomorrow         DVT prophylaxis:  Mechanical DVT prophylaxis orders are present.        CODE STATUS:   Level Of Support Discussed With: Patient  Code Status (Patient has no pulse and is not breathing): CPR (Attempt to Resuscitate)  Medical Interventions (Patient has pulse or is breathing): Full Support      Electronically signed by Ivan Cruz MD, 05/11/24, 6:47 AM EDT.

## 2024-05-12 LAB
ALBUMIN SERPL-MCNC: 3 G/DL (ref 3.5–5.2)
ALP SERPL-CCNC: 71 U/L (ref 39–117)
ALT SERPL W P-5'-P-CCNC: 12 U/L (ref 1–33)
ANION GAP SERPL CALCULATED.3IONS-SCNC: 8 MMOL/L (ref 5–15)
AST SERPL-CCNC: 41 U/L (ref 1–32)
BASOPHILS # BLD AUTO: 0.02 10*3/MM3 (ref 0–0.2)
BASOPHILS NFR BLD AUTO: 0.2 % (ref 0–1.5)
BILIRUB CONJ SERPL-MCNC: <0.2 MG/DL (ref 0–0.3)
BILIRUB INDIRECT SERPL-MCNC: ABNORMAL MG/DL
BILIRUB SERPL-MCNC: 0.2 MG/DL (ref 0–1.2)
BILIRUB UR QL STRIP: NEGATIVE
BUN SERPL-MCNC: 21 MG/DL (ref 8–23)
BUN/CREAT SERPL: 27.3 (ref 7–25)
CALCIUM SPEC-SCNC: 8.9 MG/DL (ref 8.6–10.5)
CHLORIDE SERPL-SCNC: 100 MMOL/L (ref 98–107)
CLARITY UR: CLEAR
CO2 SERPL-SCNC: 26 MMOL/L (ref 22–29)
COLOR UR: YELLOW
CREAT SERPL-MCNC: 0.77 MG/DL (ref 0.57–1)
DEPRECATED RDW RBC AUTO: 42.4 FL (ref 37–54)
EGFRCR SERPLBLD CKD-EPI 2021: 77.6 ML/MIN/1.73
EOSINOPHIL # BLD AUTO: 0.12 10*3/MM3 (ref 0–0.4)
EOSINOPHIL NFR BLD AUTO: 1 % (ref 0.3–6.2)
ERYTHROCYTE [DISTWIDTH] IN BLOOD BY AUTOMATED COUNT: 12.7 % (ref 12.3–15.4)
GLUCOSE BLDC GLUCOMTR-MCNC: 120 MG/DL (ref 70–99)
GLUCOSE BLDC GLUCOMTR-MCNC: 149 MG/DL (ref 70–99)
GLUCOSE BLDC GLUCOMTR-MCNC: 149 MG/DL (ref 70–99)
GLUCOSE BLDC GLUCOMTR-MCNC: 99 MG/DL (ref 70–99)
GLUCOSE SERPL-MCNC: 135 MG/DL (ref 65–99)
GLUCOSE UR STRIP-MCNC: NEGATIVE MG/DL
HCT VFR BLD AUTO: 24.3 % (ref 34–46.6)
HGB BLD-MCNC: 8.1 G/DL (ref 12–15.9)
HGB UR QL STRIP.AUTO: NEGATIVE
IMM GRANULOCYTES # BLD AUTO: 0.06 10*3/MM3 (ref 0–0.05)
IMM GRANULOCYTES NFR BLD AUTO: 0.5 % (ref 0–0.5)
KETONES UR QL STRIP: NEGATIVE
LEUKOCYTE ESTERASE UR QL STRIP.AUTO: NEGATIVE
LYMPHOCYTES # BLD AUTO: 2.56 10*3/MM3 (ref 0.7–3.1)
LYMPHOCYTES NFR BLD AUTO: 22.4 % (ref 19.6–45.3)
MAGNESIUM SERPL-MCNC: 1.9 MG/DL (ref 1.6–2.4)
MCH RBC QN AUTO: 30.5 PG (ref 26.6–33)
MCHC RBC AUTO-ENTMCNC: 33.3 G/DL (ref 31.5–35.7)
MCV RBC AUTO: 91.4 FL (ref 79–97)
MONOCYTES # BLD AUTO: 0.84 10*3/MM3 (ref 0.1–0.9)
MONOCYTES NFR BLD AUTO: 7.3 % (ref 5–12)
NEUTROPHILS NFR BLD AUTO: 68.6 % (ref 42.7–76)
NEUTROPHILS NFR BLD AUTO: 7.85 10*3/MM3 (ref 1.7–7)
NITRITE UR QL STRIP: NEGATIVE
NRBC BLD AUTO-RTO: 0 /100 WBC (ref 0–0.2)
PH UR STRIP.AUTO: 5.5 [PH] (ref 5–8)
PHOSPHATE SERPL-MCNC: 3.4 MG/DL (ref 2.5–4.5)
PLATELET # BLD AUTO: 133 10*3/MM3 (ref 140–450)
PMV BLD AUTO: 9.5 FL (ref 6–12)
POTASSIUM SERPL-SCNC: 3.9 MMOL/L (ref 3.5–5.2)
PROT SERPL-MCNC: 5 G/DL (ref 6–8.5)
PROT UR QL STRIP: NEGATIVE
RBC # BLD AUTO: 2.66 10*6/MM3 (ref 3.77–5.28)
SODIUM SERPL-SCNC: 134 MMOL/L (ref 136–145)
SP GR UR STRIP: 1.01 (ref 1–1.03)
UROBILINOGEN UR QL STRIP: NORMAL
WBC NRBC COR # BLD AUTO: 11.45 10*3/MM3 (ref 3.4–10.8)

## 2024-05-12 PROCEDURE — 85025 COMPLETE CBC W/AUTO DIFF WBC: CPT | Performed by: FAMILY MEDICINE

## 2024-05-12 PROCEDURE — 97110 THERAPEUTIC EXERCISES: CPT

## 2024-05-12 PROCEDURE — 80076 HEPATIC FUNCTION PANEL: CPT | Performed by: FAMILY MEDICINE

## 2024-05-12 PROCEDURE — 82948 REAGENT STRIP/BLOOD GLUCOSE: CPT | Performed by: STUDENT IN AN ORGANIZED HEALTH CARE EDUCATION/TRAINING PROGRAM

## 2024-05-12 PROCEDURE — 94799 UNLISTED PULMONARY SVC/PX: CPT

## 2024-05-12 PROCEDURE — 84100 ASSAY OF PHOSPHORUS: CPT | Performed by: FAMILY MEDICINE

## 2024-05-12 PROCEDURE — 80048 BASIC METABOLIC PNL TOTAL CA: CPT | Performed by: FAMILY MEDICINE

## 2024-05-12 PROCEDURE — 99024 POSTOP FOLLOW-UP VISIT: CPT | Performed by: STUDENT IN AN ORGANIZED HEALTH CARE EDUCATION/TRAINING PROGRAM

## 2024-05-12 PROCEDURE — 83735 ASSAY OF MAGNESIUM: CPT | Performed by: FAMILY MEDICINE

## 2024-05-12 PROCEDURE — 82948 REAGENT STRIP/BLOOD GLUCOSE: CPT

## 2024-05-12 PROCEDURE — 97116 GAIT TRAINING THERAPY: CPT

## 2024-05-12 PROCEDURE — 97535 SELF CARE MNGMENT TRAINING: CPT

## 2024-05-12 PROCEDURE — 81003 URINALYSIS AUTO W/O SCOPE: CPT | Performed by: FAMILY MEDICINE

## 2024-05-12 RX ADMIN — PANTOPRAZOLE SODIUM 40 MG: 40 TABLET, DELAYED RELEASE ORAL at 06:03

## 2024-05-12 RX ADMIN — MONTELUKAST 10 MG: 10 TABLET, FILM COATED ORAL at 21:12

## 2024-05-12 RX ADMIN — ESCITALOPRAM OXALATE 10 MG: 10 TABLET ORAL at 08:55

## 2024-05-12 RX ADMIN — Medication 600 MG: at 17:14

## 2024-05-12 RX ADMIN — ASPIRIN 325 MG: 325 TABLET ORAL at 08:55

## 2024-05-12 RX ADMIN — Medication 10 ML: at 21:12

## 2024-05-12 RX ADMIN — PRAVASTATIN SODIUM 40 MG: 20 TABLET ORAL at 21:12

## 2024-05-12 RX ADMIN — FERROUS SULFATE TAB 325 MG (65 MG ELEMENTAL FE) 325 MG: 325 (65 FE) TAB at 08:55

## 2024-05-12 RX ADMIN — Medication 10 ML: at 21:13

## 2024-05-12 RX ADMIN — ASPIRIN 325 MG: 325 TABLET ORAL at 21:12

## 2024-05-12 RX ADMIN — SENNOSIDES AND DOCUSATE SODIUM 2 TABLET: 50; 8.6 TABLET ORAL at 08:55

## 2024-05-12 RX ADMIN — ACETAMINOPHEN 1000 MG: 500 TABLET ORAL at 06:03

## 2024-05-12 RX ADMIN — ACETAMINOPHEN 1000 MG: 500 TABLET ORAL at 13:16

## 2024-05-12 RX ADMIN — SENNOSIDES AND DOCUSATE SODIUM 2 TABLET: 50; 8.6 TABLET ORAL at 21:12

## 2024-05-12 RX ADMIN — Medication 600 MG: at 08:55

## 2024-05-12 RX ADMIN — Medication 10 ML: at 08:56

## 2024-05-12 RX ADMIN — GABAPENTIN 300 MG: 300 CAPSULE ORAL at 21:12

## 2024-05-12 RX ADMIN — GABAPENTIN 300 MG: 300 CAPSULE ORAL at 08:55

## 2024-05-12 RX ADMIN — OXYBUTYNIN CHLORIDE 10 MG: 5 TABLET, EXTENDED RELEASE ORAL at 08:55

## 2024-05-12 RX ADMIN — POLYETHYLENE GLYCOL 3350 17 G: 17 POWDER, FOR SOLUTION ORAL at 08:55

## 2024-05-12 RX ADMIN — OXYCODONE 10 MG: 5 TABLET ORAL at 13:19

## 2024-05-12 NOTE — THERAPY TREATMENT NOTE
Acute Care - Physical Therapy Progress Note   [place]     Patient Name: Laura Cardenas  : 1940  MRN: [id number]  Today's Date: 2024      Visit Dx:     ICD-10-CM ICD-9-CM   1. Closed fracture of left hip, initial encounter  S72.002A 820.8   2. Closed fracture of neck of left femur, initial encounter  S72.002A 820.8   3. Impaired mobility and ADLs  Z74.09 V49.89    Z78.9    4. Difficulty walking  R26.2 719.7     Patient Active Problem List   Diagnosis    Gastro-esophageal reflux disease without esophagitis    Mixed hyperlipidemia    Impaired fasting glucose    Vitamin D deficiency    Primary osteoarthritis involving multiple joints    Age-related osteoporosis without current pathological fracture    Suprapubic fullness    Medicare annual wellness visit, subsequent    Mixed stress and urge urinary incontinence    Non-seasonal allergic rhinitis due to pollen    History of DVT of lower extremity    Left lumbar radiculopathy    Hip fx, left, closed, initial encounter    Closed fracture of left hip     Past Medical History:   Diagnosis Date    Acute embolism and thrombosis of deep vein of left lower extremity     Age-related osteoporosis without current pathological fracture     Disorder of bone, unspecified     Diverticulosis     DJD (degenerative joint disease)     GERD without esophagitis     History of transfusion     59 years ago for childbirth    Mixed hyperlipidemia     Primary generalized (osteo)arthritis     Spinal headache      Past Surgical History:   Procedure Laterality Date    BACK SURGERY      BACK SURGERY      S/P Lumbar    BREAST BIOPSY      Breast Bx (83 & 98)    CATARACT EXTRACTION      2015 L Cataract 2015 R Cataract    COLONOSCOPY  2017    DR. AWAD    COLONOSCOPY N/A 2021    Procedure: COLONOSCOPY, WITH HOT SNARE POLYPECTOMY,;  Surgeon: Tony Thomas MD;  Location: [facility] ENDOSCOPY;  Service: Gastroenterology;  Laterality: N/A;  DIVERTICULOSIS, COLON  POLYP    REPLACEMENT TOTAL KNEE Bilateral     04/10/2019 right knee replacement 01/2020 knee replacement left    TONSILLECTOMY      10 YEARS OLD     PT Assessment (Last 12 Hours)       PT Evaluation and Treatment       Row Name 05/12/24 0800          Physical Therapy Time and Intention    Subjective Information no complaints  -CS     Document Type therapy note (daily note)  -CS     Mode of Treatment individual therapy;physical therapy  -CS     Patient Effort good  -CS     Symptoms Noted During/After Treatment none  -CS       Row Name 05/12/24 0800          Pain    Pretreatment Pain Rating 0/10 - no pain  -CS     Posttreatment Pain Rating 0/10 - no pain  -CS       Row Name 05/12/24 0800          Bed Mobility    Bed Mobility supine-sit;scooting/bridging  -CS     Scooting/Bridging Sawyer (Bed Mobility) contact guard  -CS     Supine-Sit Sawyer (Bed Mobility) contact guard  -CS     Assistive Device (Bed Mobility) bed rails  -CS       Row Name 05/12/24 0800          Transfers    Transfers car transfer  -CS       Row Name 05/12/24 0800          Sit-Stand Transfer    Sit-Stand Sawyer (Transfers) contact guard  -CS     Assistive Device (Sit-Stand Transfers) walker, front-wheeled  -CS       Row Name 05/12/24 0800          Stand-Sit Transfer    Stand-Sit Sawyer (Transfers) contact guard  -CS     Assistive Device (Stand-Sit Transfers) walker, front-wheeled  -CS       Row Name 05/12/24 0800          Car Transfer    Type (Car Transfer) sit-stand;stand-sit  -CS     Sawyer Level (Car Transfer) standby assist;1 person assist  -CS     Assistive Device (Car Transfer) walker, front-wheeled  -CS       Row Name 05/12/24 0800          Gait/Stairs (Locomotion)    Sawyer Level (Gait) contact guard  -CS     Assistive Device (Gait) walker, front-wheeled  -CS     Distance in Feet (Gait) 125  x 2 reps with brief seated rest break between reps  -CS     Pattern (Gait) 4-point;step-through  -CS     Sawyer  Level (Stairs) contact guard  -     Handrail Location (Stairs) both sides  -     Number of Steps (Stairs) 10  -     Ascending Technique (Stairs) step-to-step  -     Descending Technique (Stairs) step-to-step  -       Row Name 05/12/24 0800          Motor Skills    Therapeutic Exercise hip;knee;ankle  -       Row Name 05/12/24 0800          Hip (Therapeutic Exercise)    Hip (Therapeutic Exercise) AAROM (active assistive range of motion);isometric exercises  -     Hip AAROM (Therapeutic Exercise) left;aBduction;aDduction;sitting;supine;10 repetitions;2 sets  marches, heel slides  -     Hip Isometrics (Therapeutic Exercise) bilateral;gluteal sets;supine;10 repetitions;3 second hold;2 sets  -       Row Name 05/12/24 0800          Knee (Therapeutic Exercise)    Knee (Therapeutic Exercise) AROM (active range of motion);isometric exercises;AAROM (active assistive range of motion)  -     Knee AROM (Therapeutic Exercise) LAQ (long arc quad);SAQ (short arc quad);sitting;supine;20 repititions;left  -     Knee AAROM (Therapeutic Exercise) left;supine;10 repetitions;2 sets  SLR's  -     Knee Isometrics (Therapeutic Exercise) bilateral;quad sets;supine;10 repetitions;2 sets;3 second hold  -       Row Name 05/12/24 0800          Ankle (Therapeutic Exercise)    Ankle (Therapeutic Exercise) AROM (active range of motion)  -     Ankle AROM (Therapeutic Exercise) bilateral;dorsiflexion;plantarflexion;supine;20 repititions  -       Row Name             Wound 05/10/24 1832 Left anterior greater trochanter Incision    Wound - Properties Group Placement Date: 05/10/24  -HK Placement Time: 1832 -HK Side: Left  -HK Orientation: anterior  -HK Location: greater trochanter  -HK Primary Wound Type: Incision  -HK    Retired Wound - Properties Group Placement Date: 05/10/24  -HK Placement Time: 1832 -HK Side: Left  -HK Orientation: anterior  -HK Location: greater trochanter  -HK Primary Wound Type: Incision  -HK     Retired Wound - Properties Group Date first assessed: 05/10/24  -HK Time first assessed: 1832  -HK Side: Left  -HK Location: greater trochanter  -HK Primary Wound Type: Incision  -HK      Row Name 05/12/24 0800          Positioning and Restraints    Pre-Treatment Position in bed  -CS     Post Treatment Position chair  -CS     In Chair sitting;call light within reach;encouraged to call for assist;exit alarm on;notified nsg  -CS       Row Name 05/12/24 0800          Progress Summary (PT)    Progress Toward Functional Goals (PT) progress toward functional goals is good  -CS     Daily Progress Summary (PT) Prior to breakfast, pt. performed gait training in hallway, stair training and car T/F training before returning to room.  By this time, breakfast had arrived and pt. was allowed to sit up in chair for breakfast.  PTA returned after breakfast for ther-ex's.  Pt. provided HEP to perform in afternoon on her own and a blue leg  to assist with L LE.  Pt. was educated on HEP and all questions answered this morning.  -CS               User Key  (r) = Recorded By, (t) = Taken By, (c) = Cosigned By      Initials Name Provider Type    Starr Frost, RN Registered Nurse    Vinnie Alvarado PTA Physical Therapist Assistant                    Physical Therapy Education       Title: PT OT SLP Therapies (Done)       Topic: Physical Therapy (Done)       Point: Mobility training (Done)       Learning Progress Summary             Patient Acceptance, E, VU by  at 5/11/2024 1436                         Point: Home exercise program (Done)       Learning Progress Summary             Patient Acceptance, E, VU by  at 5/11/2024 1436                         Point: Body mechanics (Done)       Learning Progress Summary             Patient Acceptance, E, VU by  at 5/11/2024 1436                         Point: Precautions (Done)       Learning Progress Summary             Patient Acceptance, E, VU by  at 5/11/2024 1436                                          User Key       Initials Effective Dates Name Provider Type [facility] 05/08/23 -  Hernandez Blount, GERALDO Physical Therapist PT                  PT Recommendation and Plan     Progress Summary (PT)  Progress Toward Functional Goals (PT): progress toward functional goals is good  Daily Progress Summary (PT): Prior to breakfast, pt. performed gait training in hallway, stair training and car T/F training before returning to room.  By this time, breakfast had arrived and pt. was allowed to sit up in chair for breakfast.  PTA returned after breakfast for ther-ex's.  Pt. provided HEP to perform in afternoon on her own and a blue leg  to assist with L LE.  Pt. was educated on HEP and all questions answered this morning.   Outcome Measures       Row Name 05/12/24 0800 05/11/24 1400          How much help from another person do you currently need...    Turning from your back to your side while in flat bed without using bedrails? 4  -CS 3  -JH     Moving from lying on back to sitting on the side of a flat bed without bedrails? 3  -CS 3  -JH     Moving to and from a bed to a chair (including a wheelchair)? 3  -CS 3  -JH     Standing up from a chair using your arms (e.g., wheelchair, bedside chair)? 4  -CS 3  -JH     Climbing 3-5 steps with a railing? 3  -CS 2  -JH     To walk in hospital room? 3  -CS 3  -JH     AM-PAC 6 Clicks Score (PT) 20  -CS 17  -JH     Highest Level of Mobility Goal 6 --> Walk 10 steps or more  -CS 5 --> Static standing  -JH        Functional Assessment    Outcome Measure Options AM-PAC 6 Clicks Basic Mobility (PT)  - AM-PAC 6 Clicks Basic Mobility (PT)  -               User Key  (r) = Recorded By, (t) = Taken By, (c) = Cosigned By      Initials Name Provider Type    CS Vinnie Koo PTA Physical Therapist Assistant     Hernandez Blount, GERALDO Physical Therapist                     Time Calculation:    PT Charges       Row Name 05/12/24 1234 05/12/24 0839           Time Calculation    Start Time 0910  -CS 0746  -CS     PT Received On 05/12/24  -CS 05/12/24  -CS        Timed Charges    11026 - PT Therapeutic Exercise Minutes 25  -CS --     02272 - Gait Training Minutes  -- 12  -CS     10639 - PT Therapeutic Activity Minutes -- 5  -CS     42662 - PT Self Care/Mgmt Minutes -- 16  -CS        Total Minutes    Timed Charges Total Minutes 25  -CS 33  -CS      Total Minutes 25  -CS 33  -CS               User Key  (r) = Recorded By, (t) = Taken By, (c) = Cosigned By      Initials Name Provider Type     Vinnie Koo PTA Physical Therapist Assistant                  Therapy Charges for Today       Code Description Service Date Service Provider Modifiers Qty    40876985083 HC GAIT TRAINING EA 15 MIN 5/12/2024 Vinnie Koo PTA GP 1    59560140727 HC PT SELF CARE/MGMT/TRAIN EA 15 MIN 5/12/2024 Vinnie Koo PTA GP 1    70285852125 HC PT THER PROC EA 15 MIN 5/12/2024 Vinnie Koo PTA GP 2            PT G-Codes  Outcome Measure Options: AM-PAC 6 Clicks Basic Mobility (PT)  AM-PAC 6 Clicks Score (PT): 20  AM-PAC 6 Clicks Score (OT): 21    Vinnie Koo PTA  5/12/2024

## 2024-05-12 NOTE — PLAN OF CARE
Goal Outcome Evaluation:   VSS. Complaints of pain X1 today. Medicated per eMAR. Patient A/Ox4. Family at bedside. No acute changes at this time. Will continue plan of care.

## 2024-05-12 NOTE — PLAN OF CARE
Goal Outcome Evaluation:  Plan of Care Reviewed With: patient      Pt alert and oriented x 4. Sat up in chair for a while and ambulated to bathroom without difficulty. Pt did not need any pain medication this shift. Call light in reach and able to make needs known.

## 2024-05-12 NOTE — PROGRESS NOTES
[facility]   Hospitalist Progress Note  Date: 2024  Patient Name: Laura Cardenas  : 1940  MRN: [id number]  Date of admission: 2024      Subjective   Subjective     Chief complaint: Hip pain    Summary:  83-year-old female hospitalized after she fell and broke her hip, left femoral head and neck fracture, orthopedics consulted, plan for surgical intervention, status post left total hip arthroplasty    Interval follow-up: Seen and examined, no acute distress, no acute major night events, pain is tolerated, mobilizing some with therapy but has limitations, warranting rehab placement.  White blood cell count up to 11,000, hemoglobin 8.1, following these numbers closely.  AST 41, bicarb 26, sodium 134.  Urinalysis negative for suggested UTI.    Review of systems:  All systems reviewed and negative except for left hip pain intermittently, generalized fatigue and weakness    Objective   Objective     Vitals:   Temp:  [97.2 °F (36.2 °C)-98.6 °F (37 °C)] 98.6 °F (37 °C)  Heart Rate:  [80-96] 82  Resp:  [18] 18  BP: ()/(45-64) 105/50  Physical Exam                 Constitutional: Awake, alert, no acute distress lying in bed appearing comfortable              Eyes: Pupils equal, sclerae anicteric, no conjunctival injection              HENT: NCAT, mucous membranes moist              Neck: Supple, full range of motion              Respiratory: Clear to auscultation bilaterally, nonlabored respirations               Cardiovascular: RRR, no murmurs, rubs, or gallops, palpable pedal pulses bilaterally              Gastrointestinal: Positive bowel sounds, soft, nontender, nondistended              Musculoskeletal: Bilateral lower extremities equal length, distal extremities neurovascular intact, no edema              Psychiatric: Appropriate affect, cooperative              Neurologic: Oriented x 3, strength symmetric in all extremities, Cranial Nerves grossly intact to confrontation, speech clear               Skin: No rashes     Result Review    Result Review:  I have personally reviewed the pertinent results from the past 24 hours to 5/12/2024 09:20 EDT and agree with these findings:  [x]  Laboratory   CBC          5/10/2024    02:59 5/11/2024    03:10 5/12/2024    03:31   CBC   WBC 10.18  9.50  11.45    RBC 4.27  3.37  2.66    Hemoglobin 13.0  10.1  8.1    Hematocrit 39.5  31.7  24.3    MCV 92.5  94.1  91.4    MCH 30.4  30.0  30.5    MCHC 32.9  31.9  33.3    RDW 12.9  12.8  12.7    Platelets 267  195  133      BMP          5/10/2024    02:59 5/11/2024    03:10 5/12/2024    03:31   BMP   BUN 23  21  21    Creatinine 0.70  0.87  0.77    Sodium 137  136  134    Potassium 4.2  4.5  3.9    Chloride 104  103  100    CO2 26.0  25.2  26.0    Calcium 9.4  8.4  8.9      LIVER FUNCTION TESTS:      Lab 05/12/24  0331 05/11/24  0310 05/09/24  1811   TOTAL PROTEIN 5.0* 5.4* 7.1   ALBUMIN 3.0* 3.0* 4.1   GLOBULIN  --   --  3.0   ALT (SGPT) 12 11 14   AST (SGOT) 41* 17 17   BILIRUBIN 0.2 0.3 0.4   BILIRUBIN DIRECT <0.2 <0.2  --    ALK PHOS 71 76 99       [x]  Microbiology   Microbiology Results (last 10 days)       ** No results found for the last 240 hours. **              [x]  Radiology XR Hip With or Without Pelvis 2 - 3 View Left    Result Date: 5/10/2024  Impression: 1.  Status post left total hip arthroplasty without evidence of complication   Electronically Signed By-Ibrahima Rose MD On:5/10/2024 9:04 PM      CT Lower Extremity Left Without Contrast    Result Date: 5/9/2024  Impression: 1.  Comminuted, intra-articular fracture of the left femoral head which is suspected to be subacute to chronic in age. It is new since the pelvis radiograph dated 12/21/2023. No significant callus formation is noted. 2.  There are suspected mechanical erosions of the femoral head, femoral neck and anterior acetabular rim, as described above. 3.  Superior migration of the femoral neck with pseudoarticulation of the femoral neck and lateral  acetabular rim. 4.  Complete loss of the superior joint space 5.  Severe degenerative disc disease at L5-S1. 6.  Numerous foci of abnormal mineralization in the left hip likely related to the previously described mechanical erosion. Larger loose bodies around the joint measuring up to 1.1 cm are also noted.   Electronically Signed By-Ibrahima Rose MD On:5/9/2024 10:19 PM      XR Hip With or Without Pelvis 2 - 3 View Left    Result Date: 5/9/2024  Impression: 1.  Moderate deformity of the left femoral head and neck favored to be secondary to a subcapital femoral neck fracture with some extension into the femoral head. There is resulting malalignment of the femoral head and neck, as discussed above. The vagueness of the fracture line suggest the possibility that the fracture is subacute. It is new since 12/21/2023.   Electronically Signed By-Ibrahima Rose MD On:5/9/2024 7:42 PM      XR Chest 1 View    Result Date: 5/9/2024  Impression: 1.  No acute cardiopulmonary disease.   Electronically Signed ByKvng Rose MD On:5/9/2024 6:46 PM         []  EKG/Telemetry   No orders to display       []  Cardiology/Vascular   []  Pathology  [x]  Old records  []  Other:    Assessment & Plan   Assessment / Plan     Assessment/Plan:    Assessment:  Left femoral head and neck fracture likely secondary to traumatic fall with underlying osteoporosis  Chronic left lumbar radiculopathy  History of osteoporosis  Prediabetes  Dyslipidemia  Mixed stress and urge urinary incontinence     Plan:  Labs and imaging reviewed  PT/OT  Trending white blood cell count, liver enzymes, hemoglobin  Insulin sliding scale coverage continue  Continue montelukast 10 mg nightly  Continue Lexapro 10 mg daily  Continue Neurontin 300 mg twice a day  Continue Ditropan daily  Pain control with morphine  A.m. labs  Full code  DVT prophylaxis with heparin  Clinical course dictate further management  Discussed with nurse at bedside    DVT prophylaxis:  Mechanical  DVT prophylaxis orders are present.        CODE STATUS:   Level Of Support Discussed With: Patient  Code Status (Patient has no pulse and is not breathing): CPR (Attempt to Resuscitate)  Medical Interventions (Patient has pulse or is breathing): Full Support        Electronically signed by Edmond Sheppard MD, 5/12/2024, 09:20 EDT.    Portions of this documentation were transcribed electronically from a voice recognition software.  I confirm all data accurately represents the service(s) I performed at today's visit.

## 2024-05-12 NOTE — PROGRESS NOTES
[facility]     Progress Note    Patient Name: Laura Cardenas  : 1940  MRN: [id number]  Primary Care Physician:  Ari Brady MD  Date of admission: 2024    Subjective   Subjective     HPI:  No events overnight.  Up and ambulatory with PT yesterday.  Denies chest pain or shortness of breath.  Is interested in rehab placement.  Review of Systems   See HPI    Objective   Objective     Vitals:   Temp:  [97.2 °F (36.2 °C)-97.7 °F (36.5 °C)] 97.2 °F (36.2 °C)  Heart Rate:  [80-96] 96  Resp:  [18] 18  BP: ()/(45-64) 122/57  Physical Exam    General: Alert, no acute distress   Left lower extremity: Aquacel intact.  Minimal blood spotting.  Early bruising as expected.  Swelling is mild.  Calf nontender.  Leg lengths symmetric.  Distal neurovascular intact.  No pain with logroll.    Result Review      WBC   Date Value Ref Range Status   2024 11.45 (H) 3.40 - 10.80 10*3/mm3 Final     RBC   Date Value Ref Range Status   2024 2.66 (L) 3.77 - 5.28 10*6/mm3 Final     Hemoglobin   Date Value Ref Range Status   2024 8.1 (L) 12.0 - 15.9 g/dL Final     Hematocrit   Date Value Ref Range Status   2024 24.3 (L) 34.0 - 46.6 % Final     MCV   Date Value Ref Range Status   2024 91.4 79.0 - 97.0 fL Final     MCH   Date Value Ref Range Status   2024 30.5 26.6 - 33.0 pg Final     MCHC   Date Value Ref Range Status   2024 33.3 31.5 - 35.7 g/dL Final     RDW   Date Value Ref Range Status   2024 12.7 12.3 - 15.4 % Final     RDW-SD   Date Value Ref Range Status   2024 42.4 37.0 - 54.0 fl Final     MPV   Date Value Ref Range Status   2024 9.5 6.0 - 12.0 fL Final     Platelets   Date Value Ref Range Status   2024 133 (L) 140 - 450 10*3/mm3 Final     Neutrophil %   Date Value Ref Range Status   2024 68.6 42.7 - 76.0 % Final     Lymphocyte %   Date Value Ref Range Status   2024 22.4 19.6 - 45.3 % Final     Monocyte %   Date Value Ref Range Status    05/12/2024 7.3 5.0 - 12.0 % Final     Eosinophil %   Date Value Ref Range Status   05/12/2024 1.0 0.3 - 6.2 % Final     Basophil %   Date Value Ref Range Status   05/12/2024 0.2 0.0 - 1.5 % Final     Immature Grans %   Date Value Ref Range Status   05/12/2024 0.5 0.0 - 0.5 % Final     Neutrophils, Absolute   Date Value Ref Range Status   05/12/2024 7.85 (H) 1.70 - 7.00 10*3/mm3 Final     Lymphocytes, Absolute   Date Value Ref Range Status   05/12/2024 2.56 0.70 - 3.10 10*3/mm3 Final     Monocytes, Absolute   Date Value Ref Range Status   05/12/2024 0.84 0.10 - 0.90 10*3/mm3 Final     Eosinophils, Absolute   Date Value Ref Range Status   05/12/2024 0.12 0.00 - 0.40 10*3/mm3 Final     Basophils, Absolute   Date Value Ref Range Status   05/12/2024 0.02 0.00 - 0.20 10*3/mm3 Final     Immature Grans, Absolute   Date Value Ref Range Status   05/12/2024 0.06 (H) 0.00 - 0.05 10*3/mm3 Final     nRBC   Date Value Ref Range Status   05/12/2024 0.0 0.0 - 0.2 /100 WBC Final        Result Review:  I have personally reviewed the results from the time of this admission to 5/12/2024 07:22 EDT and agree with these findings:  [x]  Laboratory  []  Microbiology  [x]  Radiology  []  EKG/Telemetry   []  Cardiology/Vascular   []  Pathology  []  Old records  []  Other:      Assessment & Plan   Assessment / Plan     Brief Patient Summary:  Laura Cardenas is a 83 y.o. 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  2-week orthopedic follow-up after discharge.         DVT prophylaxis:  Mechanical DVT prophylaxis orders are  present.        CODE STATUS:   Level Of Support Discussed With: Patient  Code Status (Patient has no pulse and is not breathing): CPR (Attempt to Resuscitate)  Medical Interventions (Patient has pulse or is breathing): Full Support      Electronically signed by Ivan Cruz MD, 05/12/24, 7:22 AM EDT.

## 2024-05-13 ENCOUNTER — HOSPITAL ENCOUNTER (INPATIENT)
Facility: HOSPITAL | Age: 84
Discharge: HOME HEALTH | DRG: 948 | End: 2024-05-13
Attending: FAMILY MEDICINE | Admitting: FAMILY MEDICINE
Payer: MEDICARE

## 2024-05-13 VITALS
HEART RATE: 79 BPM | BODY MASS INDEX: 30.31 KG/M2 | TEMPERATURE: 98.2 F | SYSTOLIC BLOOD PRESSURE: 111 MMHG | OXYGEN SATURATION: 95 % | DIASTOLIC BLOOD PRESSURE: 49 MMHG | RESPIRATION RATE: 18 BRPM | HEIGHT: 62 IN | WEIGHT: 164.68 LBS

## 2024-05-13 DIAGNOSIS — R26.2 DIFFICULTY WALKING: ICD-10-CM

## 2024-05-13 DIAGNOSIS — Z78.9 DECREASED ACTIVITIES OF DAILY LIVING (ADL): Primary | ICD-10-CM

## 2024-05-13 PROBLEM — R53.81 PHYSICAL DEBILITY: Status: ACTIVE | Noted: 2024-05-13

## 2024-05-13 LAB
ALBUMIN SERPL-MCNC: 2.8 G/DL (ref 3.5–5.2)
ALP SERPL-CCNC: 78 U/L (ref 39–117)
ALT SERPL W P-5'-P-CCNC: 12 U/L (ref 1–33)
ANION GAP SERPL CALCULATED.3IONS-SCNC: 7.2 MMOL/L (ref 5–15)
AST SERPL-CCNC: 46 U/L (ref 1–32)
BASOPHILS # BLD AUTO: 0.03 10*3/MM3 (ref 0–0.2)
BASOPHILS NFR BLD AUTO: 0.3 % (ref 0–1.5)
BILIRUB CONJ SERPL-MCNC: <0.2 MG/DL (ref 0–0.3)
BILIRUB INDIRECT SERPL-MCNC: ABNORMAL MG/DL
BILIRUB SERPL-MCNC: 0.2 MG/DL (ref 0–1.2)
BUN SERPL-MCNC: 18 MG/DL (ref 8–23)
BUN/CREAT SERPL: 25 (ref 7–25)
CALCIUM SPEC-SCNC: 8.4 MG/DL (ref 8.6–10.5)
CHLORIDE SERPL-SCNC: 106 MMOL/L (ref 98–107)
CO2 SERPL-SCNC: 26.8 MMOL/L (ref 22–29)
CREAT SERPL-MCNC: 0.72 MG/DL (ref 0.57–1)
DEPRECATED RDW RBC AUTO: 43.6 FL (ref 37–54)
EGFRCR SERPLBLD CKD-EPI 2021: 84.1 ML/MIN/1.73
EOSINOPHIL # BLD AUTO: 0.2 10*3/MM3 (ref 0–0.4)
EOSINOPHIL NFR BLD AUTO: 2.3 % (ref 0.3–6.2)
ERYTHROCYTE [DISTWIDTH] IN BLOOD BY AUTOMATED COUNT: 13.1 % (ref 12.3–15.4)
GLUCOSE BLDC GLUCOMTR-MCNC: 121 MG/DL (ref 70–99)
GLUCOSE BLDC GLUCOMTR-MCNC: 122 MG/DL (ref 70–99)
GLUCOSE BLDC GLUCOMTR-MCNC: 122 MG/DL (ref 70–99)
GLUCOSE SERPL-MCNC: 122 MG/DL (ref 65–99)
HCT VFR BLD AUTO: 23.2 % (ref 34–46.6)
HGB BLD-MCNC: 7.6 G/DL (ref 12–15.9)
IMM GRANULOCYTES # BLD AUTO: 0.02 10*3/MM3 (ref 0–0.05)
IMM GRANULOCYTES NFR BLD AUTO: 0.2 % (ref 0–0.5)
LYMPHOCYTES # BLD AUTO: 1.88 10*3/MM3 (ref 0.7–3.1)
LYMPHOCYTES NFR BLD AUTO: 21.7 % (ref 19.6–45.3)
MAGNESIUM SERPL-MCNC: 1.9 MG/DL (ref 1.6–2.4)
MCH RBC QN AUTO: 30.5 PG (ref 26.6–33)
MCHC RBC AUTO-ENTMCNC: 32.8 G/DL (ref 31.5–35.7)
MCV RBC AUTO: 93.2 FL (ref 79–97)
MONOCYTES # BLD AUTO: 0.8 10*3/MM3 (ref 0.1–0.9)
MONOCYTES NFR BLD AUTO: 9.2 % (ref 5–12)
NEUTROPHILS NFR BLD AUTO: 5.74 10*3/MM3 (ref 1.7–7)
NEUTROPHILS NFR BLD AUTO: 66.3 % (ref 42.7–76)
NRBC BLD AUTO-RTO: 0.2 /100 WBC (ref 0–0.2)
PHOSPHATE SERPL-MCNC: 1.9 MG/DL (ref 2.5–4.5)
PLATELET # BLD AUTO: 151 10*3/MM3 (ref 140–450)
PMV BLD AUTO: 9.9 FL (ref 6–12)
POTASSIUM SERPL-SCNC: 4 MMOL/L (ref 3.5–5.2)
PROT SERPL-MCNC: 5.2 G/DL (ref 6–8.5)
RBC # BLD AUTO: 2.49 10*6/MM3 (ref 3.77–5.28)
SODIUM SERPL-SCNC: 140 MMOL/L (ref 136–145)
WBC NRBC COR # BLD AUTO: 8.67 10*3/MM3 (ref 3.4–10.8)

## 2024-05-13 PROCEDURE — 85025 COMPLETE CBC W/AUTO DIFF WBC: CPT | Performed by: FAMILY MEDICINE

## 2024-05-13 PROCEDURE — 80048 BASIC METABOLIC PNL TOTAL CA: CPT | Performed by: FAMILY MEDICINE

## 2024-05-13 PROCEDURE — 80076 HEPATIC FUNCTION PANEL: CPT | Performed by: FAMILY MEDICINE

## 2024-05-13 PROCEDURE — 97110 THERAPEUTIC EXERCISES: CPT

## 2024-05-13 PROCEDURE — 84100 ASSAY OF PHOSPHORUS: CPT | Performed by: FAMILY MEDICINE

## 2024-05-13 PROCEDURE — 25810000003 SODIUM CHLORIDE 0.9 % SOLUTION: Performed by: FAMILY MEDICINE

## 2024-05-13 PROCEDURE — 94799 UNLISTED PULMONARY SVC/PX: CPT

## 2024-05-13 PROCEDURE — 82948 REAGENT STRIP/BLOOD GLUCOSE: CPT

## 2024-05-13 PROCEDURE — 82948 REAGENT STRIP/BLOOD GLUCOSE: CPT | Performed by: STUDENT IN AN ORGANIZED HEALTH CARE EDUCATION/TRAINING PROGRAM

## 2024-05-13 PROCEDURE — 83735 ASSAY OF MAGNESIUM: CPT | Performed by: FAMILY MEDICINE

## 2024-05-13 PROCEDURE — 97116 GAIT TRAINING THERAPY: CPT

## 2024-05-13 PROCEDURE — 99024 POSTOP FOLLOW-UP VISIT: CPT | Performed by: STUDENT IN AN ORGANIZED HEALTH CARE EDUCATION/TRAINING PROGRAM

## 2024-05-13 RX ORDER — NICOTINE POLACRILEX 4 MG
15 LOZENGE BUCCAL
Status: CANCELLED | OUTPATIENT
Start: 2024-05-13

## 2024-05-13 RX ORDER — LACTULOSE 10 G/15ML
10 SOLUTION ORAL
Status: ACTIVE | OUTPATIENT
Start: 2024-05-13 | End: 2024-05-13

## 2024-05-13 RX ORDER — FENTANYL/ROPIVACAINE/NS/PF 2-625MCG/1
15 PLASTIC BAG, INJECTION (ML) EPIDURAL ONCE
Status: COMPLETED | OUTPATIENT
Start: 2024-05-13 | End: 2024-05-13

## 2024-05-13 RX ORDER — BISACODYL 10 MG
10 SUPPOSITORY, RECTAL RECTAL DAILY PRN
Status: CANCELLED | OUTPATIENT
Start: 2024-05-13

## 2024-05-13 RX ORDER — SODIUM CHLORIDE 0.9 % (FLUSH) 0.9 %
10 SYRINGE (ML) INJECTION AS NEEDED
Status: CANCELLED | OUTPATIENT
Start: 2024-05-13

## 2024-05-13 RX ORDER — OXYCODONE HYDROCHLORIDE 5 MG/1
10 TABLET ORAL EVERY 4 HOURS PRN
Status: DISCONTINUED | OUTPATIENT
Start: 2024-05-13 | End: 2024-05-21 | Stop reason: HOSPADM

## 2024-05-13 RX ORDER — BISACODYL 5 MG/1
5 TABLET, DELAYED RELEASE ORAL DAILY PRN
Status: DISCONTINUED | OUTPATIENT
Start: 2024-05-13 | End: 2024-05-21 | Stop reason: HOSPADM

## 2024-05-13 RX ORDER — ESCITALOPRAM OXALATE 10 MG/1
10 TABLET ORAL DAILY
Status: CANCELLED | OUTPATIENT
Start: 2024-05-14

## 2024-05-13 RX ORDER — GABAPENTIN 300 MG/1
300 CAPSULE ORAL EVERY 12 HOURS SCHEDULED
Status: DISCONTINUED | OUTPATIENT
Start: 2024-05-13 | End: 2024-05-21 | Stop reason: HOSPADM

## 2024-05-13 RX ORDER — GABAPENTIN 300 MG/1
300 CAPSULE ORAL EVERY 12 HOURS SCHEDULED
Status: CANCELLED | OUTPATIENT
Start: 2024-05-13

## 2024-05-13 RX ORDER — INSULIN LISPRO 100 [IU]/ML
2-7 INJECTION, SOLUTION INTRAVENOUS; SUBCUTANEOUS
Status: DISCONTINUED | OUTPATIENT
Start: 2024-05-13 | End: 2024-05-15

## 2024-05-13 RX ORDER — PROMETHAZINE HYDROCHLORIDE 25 MG/1
12.5 TABLET ORAL EVERY 6 HOURS PRN
Status: DISCONTINUED | OUTPATIENT
Start: 2024-05-13 | End: 2024-05-21 | Stop reason: HOSPADM

## 2024-05-13 RX ORDER — IBUPROFEN 600 MG/1
1 TABLET ORAL
Status: CANCELLED | OUTPATIENT
Start: 2024-05-13

## 2024-05-13 RX ORDER — AMOXICILLIN 250 MG
2 CAPSULE ORAL 2 TIMES DAILY
Status: CANCELLED | OUTPATIENT
Start: 2024-05-13

## 2024-05-13 RX ORDER — SODIUM CHLORIDE 9 MG/ML
40 INJECTION, SOLUTION INTRAVENOUS AS NEEDED
Status: DISCONTINUED | OUTPATIENT
Start: 2024-05-13 | End: 2024-05-14 | Stop reason: SDUPTHER

## 2024-05-13 RX ORDER — POLYETHYLENE GLYCOL 3350 17 G/17G
17 POWDER, FOR SOLUTION ORAL DAILY
Status: CANCELLED | OUTPATIENT
Start: 2024-05-14

## 2024-05-13 RX ORDER — PANTOPRAZOLE SODIUM 40 MG/1
40 TABLET, DELAYED RELEASE ORAL
Status: CANCELLED | OUTPATIENT
Start: 2024-05-14

## 2024-05-13 RX ORDER — ACETAMINOPHEN 325 MG/1
650 TABLET ORAL EVERY 6 HOURS PRN
Status: DISCONTINUED | OUTPATIENT
Start: 2024-05-13 | End: 2024-05-21 | Stop reason: HOSPADM

## 2024-05-13 RX ORDER — LACTULOSE 10 G/15ML
10 SOLUTION ORAL
Status: DISCONTINUED | OUTPATIENT
Start: 2024-05-13 | End: 2024-05-13

## 2024-05-13 RX ORDER — SODIUM CHLORIDE 9 MG/ML
40 INJECTION, SOLUTION INTRAVENOUS AS NEEDED
Status: CANCELLED | OUTPATIENT
Start: 2024-05-13

## 2024-05-13 RX ORDER — MONTELUKAST SODIUM 10 MG/1
10 TABLET ORAL NIGHTLY
Status: DISCONTINUED | OUTPATIENT
Start: 2024-05-13 | End: 2024-05-21 | Stop reason: HOSPADM

## 2024-05-13 RX ORDER — PROMETHAZINE HYDROCHLORIDE 25 MG/1
12.5 TABLET ORAL EVERY 6 HOURS PRN
Status: CANCELLED | OUTPATIENT
Start: 2024-05-13

## 2024-05-13 RX ORDER — ONDANSETRON 2 MG/ML
4 INJECTION INTRAMUSCULAR; INTRAVENOUS EVERY 6 HOURS PRN
Status: DISCONTINUED | OUTPATIENT
Start: 2024-05-13 | End: 2024-05-21 | Stop reason: HOSPADM

## 2024-05-13 RX ORDER — SODIUM CHLORIDE 9 MG/ML
40 INJECTION, SOLUTION INTRAVENOUS AS NEEDED
Status: DISCONTINUED | OUTPATIENT
Start: 2024-05-13 | End: 2024-05-21 | Stop reason: HOSPADM

## 2024-05-13 RX ORDER — ONDANSETRON 2 MG/ML
4 INJECTION INTRAMUSCULAR; INTRAVENOUS EVERY 6 HOURS PRN
Status: CANCELLED | OUTPATIENT
Start: 2024-05-13

## 2024-05-13 RX ORDER — BISACODYL 10 MG
10 SUPPOSITORY, RECTAL RECTAL DAILY PRN
Status: DISCONTINUED | OUTPATIENT
Start: 2024-05-13 | End: 2024-05-13 | Stop reason: SDUPTHER

## 2024-05-13 RX ORDER — POLYETHYLENE GLYCOL 3350 17 G/17G
17 POWDER, FOR SOLUTION ORAL DAILY PRN
Status: DISCONTINUED | OUTPATIENT
Start: 2024-05-13 | End: 2024-05-21 | Stop reason: HOSPADM

## 2024-05-13 RX ORDER — ONDANSETRON 4 MG/1
4 TABLET, ORALLY DISINTEGRATING ORAL EVERY 6 HOURS PRN
Status: DISCONTINUED | OUTPATIENT
Start: 2024-05-13 | End: 2024-05-21 | Stop reason: HOSPADM

## 2024-05-13 RX ORDER — PROMETHAZINE HYDROCHLORIDE 12.5 MG/1
12.5 SUPPOSITORY RECTAL EVERY 6 HOURS PRN
Status: DISCONTINUED | OUTPATIENT
Start: 2024-05-13 | End: 2024-05-21 | Stop reason: HOSPADM

## 2024-05-13 RX ORDER — SODIUM CHLORIDE 0.9 % (FLUSH) 0.9 %
10 SYRINGE (ML) INJECTION EVERY 12 HOURS SCHEDULED
Status: DISCONTINUED | OUTPATIENT
Start: 2024-05-13 | End: 2024-05-21 | Stop reason: HOSPADM

## 2024-05-13 RX ORDER — PRAVASTATIN SODIUM 20 MG
40 TABLET ORAL NIGHTLY
Status: DISCONTINUED | OUTPATIENT
Start: 2024-05-13 | End: 2024-05-21 | Stop reason: HOSPADM

## 2024-05-13 RX ORDER — ASPIRIN 325 MG
325 TABLET ORAL EVERY 12 HOURS SCHEDULED
Status: DISCONTINUED | OUTPATIENT
Start: 2024-05-13 | End: 2024-05-21 | Stop reason: HOSPADM

## 2024-05-13 RX ORDER — SODIUM CHLORIDE 0.9 % (FLUSH) 0.9 %
10 SYRINGE (ML) INJECTION EVERY 12 HOURS SCHEDULED
Status: CANCELLED | OUTPATIENT
Start: 2024-05-13

## 2024-05-13 RX ORDER — LACTULOSE 10 G/15ML
10 SOLUTION ORAL
Status: CANCELLED | OUTPATIENT
Start: 2024-05-13 | End: 2024-05-13

## 2024-05-13 RX ORDER — POLYETHYLENE GLYCOL 3350 17 G/17G
17 POWDER, FOR SOLUTION ORAL DAILY
Status: DISCONTINUED | OUTPATIENT
Start: 2024-05-14 | End: 2024-05-21 | Stop reason: HOSPADM

## 2024-05-13 RX ORDER — MONTELUKAST SODIUM 10 MG/1
10 TABLET ORAL NIGHTLY
Status: CANCELLED | OUTPATIENT
Start: 2024-05-13

## 2024-05-13 RX ORDER — AMOXICILLIN 250 MG
2 CAPSULE ORAL 2 TIMES DAILY
Status: DISCONTINUED | OUTPATIENT
Start: 2024-05-13 | End: 2024-05-21 | Stop reason: HOSPADM

## 2024-05-13 RX ORDER — ACETAMINOPHEN 325 MG/1
325 TABLET ORAL EVERY 4 HOURS PRN
Status: CANCELLED | OUTPATIENT
Start: 2024-05-13

## 2024-05-13 RX ORDER — SODIUM CHLORIDE 0.9 % (FLUSH) 0.9 %
10 SYRINGE (ML) INJECTION AS NEEDED
Status: DISCONTINUED | OUTPATIENT
Start: 2024-05-13 | End: 2024-05-21 | Stop reason: HOSPADM

## 2024-05-13 RX ORDER — OXYCODONE HYDROCHLORIDE 5 MG/1
5 TABLET ORAL EVERY 4 HOURS PRN
Status: DISCONTINUED | OUTPATIENT
Start: 2024-05-13 | End: 2024-05-21 | Stop reason: HOSPADM

## 2024-05-13 RX ORDER — OXYCODONE HYDROCHLORIDE 5 MG/1
10 TABLET ORAL EVERY 4 HOURS PRN
Status: CANCELLED | OUTPATIENT
Start: 2024-05-13 | End: 2024-05-17

## 2024-05-13 RX ORDER — OXYBUTYNIN CHLORIDE 5 MG/1
10 TABLET, EXTENDED RELEASE ORAL DAILY
Status: CANCELLED | OUTPATIENT
Start: 2024-05-14

## 2024-05-13 RX ORDER — PANTOPRAZOLE SODIUM 40 MG/1
40 TABLET, DELAYED RELEASE ORAL
Status: DISCONTINUED | OUTPATIENT
Start: 2024-05-14 | End: 2024-05-21 | Stop reason: HOSPADM

## 2024-05-13 RX ORDER — MAG HYDROX/ALUMINUM HYD/SIMETH 400-400-40
1 SUSPENSION, ORAL (FINAL DOSE FORM) ORAL ONCE
Status: DISCONTINUED | OUTPATIENT
Start: 2024-05-13 | End: 2024-05-21 | Stop reason: HOSPADM

## 2024-05-13 RX ORDER — NICOTINE POLACRILEX 4 MG
15 LOZENGE BUCCAL
Status: DISCONTINUED | OUTPATIENT
Start: 2024-05-13 | End: 2024-05-21 | Stop reason: HOSPADM

## 2024-05-13 RX ORDER — ESCITALOPRAM OXALATE 10 MG/1
10 TABLET ORAL DAILY
Status: DISCONTINUED | OUTPATIENT
Start: 2024-05-14 | End: 2024-05-21 | Stop reason: HOSPADM

## 2024-05-13 RX ORDER — DEXTROSE MONOHYDRATE 25 G/50ML
25 INJECTION, SOLUTION INTRAVENOUS
Status: CANCELLED | OUTPATIENT
Start: 2024-05-13

## 2024-05-13 RX ORDER — FERROUS SULFATE 325(65) MG
325 TABLET ORAL
Status: DISCONTINUED | OUTPATIENT
Start: 2024-05-14 | End: 2024-05-21 | Stop reason: HOSPADM

## 2024-05-13 RX ORDER — FERROUS SULFATE 325(65) MG
325 TABLET ORAL
Status: CANCELLED | OUTPATIENT
Start: 2024-05-14

## 2024-05-13 RX ORDER — MORPHINE SULFATE 2 MG/ML
2 INJECTION, SOLUTION INTRAMUSCULAR; INTRAVENOUS EVERY 4 HOURS PRN
Status: ACTIVE | OUTPATIENT
Start: 2024-05-13 | End: 2024-05-14

## 2024-05-13 RX ORDER — AMOXICILLIN 250 MG
2 CAPSULE ORAL 2 TIMES DAILY PRN
Status: DISCONTINUED | OUTPATIENT
Start: 2024-05-13 | End: 2024-05-21 | Stop reason: HOSPADM

## 2024-05-13 RX ORDER — ASPIRIN 325 MG
325 TABLET ORAL EVERY 12 HOURS SCHEDULED
Status: CANCELLED | OUTPATIENT
Start: 2024-05-13

## 2024-05-13 RX ORDER — BISACODYL 5 MG/1
5 TABLET, DELAYED RELEASE ORAL DAILY PRN
Status: CANCELLED | OUTPATIENT
Start: 2024-05-13

## 2024-05-13 RX ORDER — BISACODYL 10 MG
10 SUPPOSITORY, RECTAL RECTAL DAILY PRN
Status: DISCONTINUED | OUTPATIENT
Start: 2024-05-13 | End: 2024-05-21 | Stop reason: HOSPADM

## 2024-05-13 RX ORDER — MAG HYDROX/ALUMINUM HYD/SIMETH 400-400-40
1 SUSPENSION, ORAL (FINAL DOSE FORM) ORAL ONCE
Status: DISCONTINUED | OUTPATIENT
Start: 2024-05-13 | End: 2024-05-13

## 2024-05-13 RX ORDER — SODIUM CHLORIDE 0.9 % (FLUSH) 0.9 %
10 SYRINGE (ML) INJECTION EVERY 12 HOURS SCHEDULED
Status: DISCONTINUED | OUTPATIENT
Start: 2024-05-13 | End: 2024-05-14 | Stop reason: SDUPTHER

## 2024-05-13 RX ORDER — OXYCODONE HYDROCHLORIDE 5 MG/1
5 TABLET ORAL EVERY 4 HOURS PRN
Status: CANCELLED | OUTPATIENT
Start: 2024-05-13 | End: 2024-05-17

## 2024-05-13 RX ORDER — POLYETHYLENE GLYCOL 3350 17 G/17G
17 POWDER, FOR SOLUTION ORAL DAILY PRN
Status: CANCELLED | OUTPATIENT
Start: 2024-05-13

## 2024-05-13 RX ORDER — MORPHINE SULFATE 2 MG/ML
2 INJECTION, SOLUTION INTRAMUSCULAR; INTRAVENOUS EVERY 4 HOURS PRN
Status: CANCELLED | OUTPATIENT
Start: 2024-05-13 | End: 2024-05-14

## 2024-05-13 RX ORDER — ACETAMINOPHEN 325 MG/1
650 TABLET ORAL EVERY 6 HOURS PRN
Status: CANCELLED | OUTPATIENT
Start: 2024-05-13

## 2024-05-13 RX ORDER — AMOXICILLIN 250 MG
2 CAPSULE ORAL 2 TIMES DAILY PRN
Status: CANCELLED | OUTPATIENT
Start: 2024-05-13

## 2024-05-13 RX ORDER — PROMETHAZINE HYDROCHLORIDE 12.5 MG/1
12.5 SUPPOSITORY RECTAL EVERY 6 HOURS PRN
Status: CANCELLED | OUTPATIENT
Start: 2024-05-13

## 2024-05-13 RX ORDER — ONDANSETRON 4 MG/1
4 TABLET, ORALLY DISINTEGRATING ORAL EVERY 6 HOURS PRN
Status: CANCELLED | OUTPATIENT
Start: 2024-05-13

## 2024-05-13 RX ORDER — HYDRALAZINE HYDROCHLORIDE 20 MG/ML
10 INJECTION INTRAMUSCULAR; INTRAVENOUS EVERY 6 HOURS PRN
Status: DISCONTINUED | OUTPATIENT
Start: 2024-05-13 | End: 2024-05-21 | Stop reason: HOSPADM

## 2024-05-13 RX ORDER — INSULIN LISPRO 100 [IU]/ML
2-7 INJECTION, SOLUTION INTRAVENOUS; SUBCUTANEOUS
Status: CANCELLED | OUTPATIENT
Start: 2024-05-13

## 2024-05-13 RX ORDER — ACETAMINOPHEN 325 MG/1
325 TABLET ORAL EVERY 4 HOURS PRN
Status: DISCONTINUED | OUTPATIENT
Start: 2024-05-13 | End: 2024-05-21 | Stop reason: HOSPADM

## 2024-05-13 RX ORDER — DEXTROSE MONOHYDRATE 25 G/50ML
25 INJECTION, SOLUTION INTRAVENOUS
Status: DISCONTINUED | OUTPATIENT
Start: 2024-05-13 | End: 2024-05-21 | Stop reason: HOSPADM

## 2024-05-13 RX ORDER — SODIUM CHLORIDE 0.9 % (FLUSH) 0.9 %
10 SYRINGE (ML) INJECTION AS NEEDED
Status: DISCONTINUED | OUTPATIENT
Start: 2024-05-13 | End: 2024-05-14 | Stop reason: SDUPTHER

## 2024-05-13 RX ORDER — HYDRALAZINE HYDROCHLORIDE 20 MG/ML
10 INJECTION INTRAMUSCULAR; INTRAVENOUS EVERY 6 HOURS PRN
Status: CANCELLED | OUTPATIENT
Start: 2024-05-13

## 2024-05-13 RX ORDER — OXYBUTYNIN CHLORIDE 5 MG/1
10 TABLET, EXTENDED RELEASE ORAL DAILY
Status: DISCONTINUED | OUTPATIENT
Start: 2024-05-14 | End: 2024-05-21 | Stop reason: HOSPADM

## 2024-05-13 RX ORDER — IBUPROFEN 600 MG/1
1 TABLET ORAL
Status: DISCONTINUED | OUTPATIENT
Start: 2024-05-13 | End: 2024-05-21 | Stop reason: HOSPADM

## 2024-05-13 RX ORDER — MAG HYDROX/ALUMINUM HYD/SIMETH 400-400-40
1 SUSPENSION, ORAL (FINAL DOSE FORM) ORAL ONCE
Status: CANCELLED | OUTPATIENT
Start: 2024-05-13

## 2024-05-13 RX ORDER — PRAVASTATIN SODIUM 20 MG
40 TABLET ORAL NIGHTLY
Status: CANCELLED | OUTPATIENT
Start: 2024-05-13

## 2024-05-13 RX ADMIN — GABAPENTIN 300 MG: 300 CAPSULE ORAL at 21:41

## 2024-05-13 RX ADMIN — ESCITALOPRAM OXALATE 10 MG: 10 TABLET ORAL at 09:04

## 2024-05-13 RX ADMIN — Medication 600 MG: at 09:04

## 2024-05-13 RX ADMIN — Medication 10 ML: at 21:51

## 2024-05-13 RX ADMIN — PRAVASTATIN SODIUM 40 MG: 20 TABLET ORAL at 21:41

## 2024-05-13 RX ADMIN — Medication 10 ML: at 09:07

## 2024-05-13 RX ADMIN — POTASSIUM & SODIUM PHOSPHATES POWDER PACK 280-160-250 MG 1 PACKET: 280-160-250 PACK at 09:04

## 2024-05-13 RX ADMIN — FERROUS SULFATE TAB 325 MG (65 MG ELEMENTAL FE) 325 MG: 325 (65 FE) TAB at 09:06

## 2024-05-13 RX ADMIN — OXYBUTYNIN CHLORIDE 10 MG: 5 TABLET, EXTENDED RELEASE ORAL at 09:05

## 2024-05-13 RX ADMIN — ASPIRIN 325 MG: 325 TABLET ORAL at 09:05

## 2024-05-13 RX ADMIN — POTASSIUM & SODIUM PHOSPHATES POWDER PACK 280-160-250 MG 1 PACKET: 280-160-250 PACK at 13:16

## 2024-05-13 RX ADMIN — ASPIRIN 325 MG: 325 TABLET ORAL at 21:40

## 2024-05-13 RX ADMIN — MONTELUKAST 10 MG: 10 TABLET, FILM COATED ORAL at 21:40

## 2024-05-13 RX ADMIN — POTASSIUM PHOSPHATES 15 MMOL: 236; 224 INJECTION, SOLUTION INTRAVENOUS at 05:33

## 2024-05-13 RX ADMIN — SENNOSIDES AND DOCUSATE SODIUM 2 TABLET: 50; 8.6 TABLET ORAL at 09:03

## 2024-05-13 RX ADMIN — POLYETHYLENE GLYCOL 3350 17 G: 17 POWDER, FOR SOLUTION ORAL at 09:07

## 2024-05-13 RX ADMIN — TUBERCULIN PURIFIED PROTEIN DERIVATIVE 5 UNITS: 5 INJECTION, SOLUTION INTRADERMAL at 21:41

## 2024-05-13 RX ADMIN — PANTOPRAZOLE SODIUM 40 MG: 40 TABLET, DELAYED RELEASE ORAL at 05:28

## 2024-05-13 RX ADMIN — Medication 600 MG: at 21:41

## 2024-05-13 RX ADMIN — GABAPENTIN 300 MG: 300 CAPSULE ORAL at 09:06

## 2024-05-13 NOTE — PLAN OF CARE
Problem: Adult Inpatient Plan of Care  Goal: Absence of Hospital-Acquired Illness or Injury  Intervention: Identify and Manage Fall Risk  Recent Flowsheet Documentation  Taken 5/13/2024 0256 by Natty Brown LPN  Safety Promotion/Fall Prevention: safety round/check completed  Taken 5/13/2024 0109 by Natty Brown LPN  Safety Promotion/Fall Prevention: safety round/check completed  Taken 5/12/2024 2130 by Natty Brown LPN  Safety Promotion/Fall Prevention: safety round/check completed  Taken 5/12/2024 1930 by Natty Brown LPN  Safety Promotion/Fall Prevention: safety round/check completed  Intervention: Prevent Skin Injury  Recent Flowsheet Documentation  Taken 5/12/2024 2130 by Natty Brown LPN  Body Position: position changed independently  Skin Protection: adhesive use limited  Intervention: Prevent and Manage VTE (Venous Thromboembolism) Risk  Recent Flowsheet Documentation  Taken 5/12/2024 2130 by Natty Brown LPN  Activity Management: activity encouraged  VTE Prevention/Management: other (see comments)  Range of Motion: active ROM (range of motion) encouraged  Intervention: Prevent Infection  Recent Flowsheet Documentation  Taken 5/12/2024 2130 by Natty Brown LPN  Infection Prevention:   hand hygiene promoted   rest/sleep promoted  Goal: Optimal Comfort and Wellbeing  Intervention: Monitor Pain and Promote Comfort  Recent Flowsheet Documentation  Taken 5/12/2024 2130 by Natty Brown LPN  Pain Management Interventions:   see MAR   relaxation techniques promoted   quiet environment facilitated  Intervention: Provide Person-Centered Care  Recent Flowsheet Documentation  Taken 5/12/2024 2130 by Natty Brown LPN  Trust Relationship/Rapport:   care explained   choices provided   Goal Outcome Evaluation:      Pt A&O, pain controlled, VSS. Pt resting in bed, is able to make needs known, call light in reach, will continue current POC

## 2024-05-13 NOTE — DISCHARGE SUMMARY
[facility]         HOSPITALIST  DISCHARGE SUMMARY    Patient Name: Laura Cardenas  : 1940  MRN: [id number]    Date of Admission: 2024  Date of Discharge:  2024    Primary Care Physician: Ari Brady MD    Consults       Date and Time Order Name Status Description    2024  8:26 PM Inpatient Orthopedic Surgery Consult Completed     2024  8:02 PM Inpatient Hospitalist Consult              Active and Resolved Hospital Problems:  Left femoral head and neck fracture likely secondary to traumatic fall with underlying osteoporosis  Chronic left lumbar radiculopathy  History of osteoporosis  Prediabetes  Dyslipidemia  Mixed stress and urge urinary incontinence     Active Hospital Problems    Diagnosis POA    **Hip fx, left, closed, initial encounter [S72.002A] Yes    Closed fracture of left hip [S72.002A] Unknown      Resolved Hospital Problems   No resolved problems to display.       Hospital Course     Hospital Course:  83-year-old female hospitalized after she fell and broke her hip, left femoral head and neck fracture, orthopedics consulted, plan for surgical intervention, status post left total hip arthroplasty.  Remains weak and fatigued postsurgery, qualified for rehab.  Discharged to skilled nursing facility [facility] on 2024 pending bowel movement.  Hospitalist service team will follow patient on skilled nursing facility.      Day of Discharge     Vital Signs:  Temp:  [98.2 °F (36.8 °C)-98.8 °F (37.1 °C)] 98.2 °F (36.8 °C)  Heart Rate:  [] 79  Resp:  [18] 18  BP: (101-129)/(48-55) 111/49  Review of systems:  All systems reviewed and negative except for left hip pain intermittently, generalized fatigue and weakness    Physical Exam                            Constitutional: Awake, alert, no acute distress lying in bed appearing comfortable              Eyes: Pupils equal, sclerae anicteric, no conjunctival injection              HENT: NCAT, mucous  membranes moist              Neck: Supple, full range of motion              Respiratory: Clear to auscultation bilaterally, nonlabored respirations               Cardiovascular: RRR, no murmurs, rubs, or gallops, palpable pedal pulses bilaterally              Gastrointestinal: Positive bowel sounds, soft, nontender, nondistended              Musculoskeletal: Bilateral lower extremities equal length, distal extremities neurovascular intact, no edema              Psychiatric: Appropriate affect, cooperative              Neurologic: Oriented x 3, strength symmetric in all extremities, Cranial Nerves grossly intact to confrontation, speech clear              Skin: No rashes          Discharge Medications        ASK your doctor about these medications        Instructions Start Date   BIOTIN PO   1 capsule, Oral, Daily, OTC      Calcium Carbonate 1500 (600 Ca) MG tablet   600 mg, Oral, 2 Times Daily With Meals      diclofenac 75 MG EC tablet  Commonly known as: VOLTAREN  Ask about: Which instructions should I use?   75 mg, Oral, 2 Times Daily PRN      escitalopram 10 MG tablet  Commonly known as: LEXAPRO   10 mg, Oral, Daily      esomeprazole 20 MG packet  Commonly known as: NexIUM   20 mg, Oral, Every Morning Before Breakfast      estradiol 0.1 MG/GM vaginal cream  Commonly known as: ESTRACE   Insert 1 g into the vagina 3 (Three) Times a Week.      gabapentin 300 MG capsule  Commonly known as: NEURONTIN   Take 1 capsule every a.m., take 2 capsules every p.m.      Gemtesa 75 MG tablet  Generic drug: Vibegron   1 tablet, Oral, Daily      MAGNESIUM PO   1 capsule, Oral, Daily, OTC      meloxicam 7.5 MG tablet  Commonly known as: Mobic   Take 2 tablets once a day for 2 weeks, then 1 tablet once a day.      multivitamin tablet tablet   1 tablet, Oral, Nightly      pravastatin 40 MG tablet  Commonly known as: PRAVACHOL   TAKE ONE TABLET BY MOUTH ONCE NIGHTLY               Allergies   Allergen Reactions    Apixaban Hives        Discharge Disposition:  Skilled Nursing Facility ([facility])    Diet:  Hospital:  Diet Order   Procedures    Diet: Regular/House; Fluid Consistency: Thin (IDDSI 0)       Discharge Activity:       CODE STATUS:  Code Status and Medical Interventions:   Ordered at: 05/10/24 0702     Level Of Support Discussed With:    Patient     Code Status (Patient has no pulse and is not breathing):    CPR (Attempt to Resuscitate)     Medical Interventions (Patient has pulse or is breathing):    Full Support         Future Appointments   Date Time Provider Department Center   5/24/2024  8:45 AM Ivan Cruz MD [facility] ORS WOOD [facility]   9/26/2024  9:00 AM Ari Brady MD [facility] PC MEMCT [facility]       Additional Instructions for the Follow-ups that You Need to Schedule       Discharge Follow-up with PCP   As directed       Currently Documented PCP:    Ari Brady MD    PCP Phone Number:    [phone]     Follow Up Details: 3 to 7 days                Pertinent  and/or Most Recent Results     PROCEDURES:   XR Hip With or Without Pelvis 2 - 3 View Left    Result Date: 5/10/2024  XR HIP W OR WO PELVIS 2-3 VIEW LEFT-  Date of Exam: 5/10/2024 8:10 PM  Indication: Post-Op Hip Arthroplasty; S72.002A-Fracture of unspecified part of neck of left femur, initial encounter for closed fracture; S72.002A-Fracture of unspecified part of neck of left femur, initial encounter for closed fracture  Comparison: None available.  Findings: A left hip bipolar hemiarthroplasty has been performed. Prosthetic components are in anatomic alignment. No fracture is seen. Expected postoperative changes are noted in the soft tissues.      Impression: 1.  Status post left total hip arthroplasty without evidence of complication   Electronically Signed By-Ibrahima oRse MD On:5/10/2024 9:04 PM      FL Surgery Fluoro    Result Date: 5/10/2024  This procedure was auto-finalized with no dictation required.    CT Lower Extremity Left Without Contrast    Result  Date: 5/9/2024  CT LOWER EXTREMITY LEFT WO CONTRAST-  Date of Exam: 5/9/2024 8:30 PM  Indication: hip fx; S72.002A-Fracture of unspecified part of neck of left femur, initial encounter for closed fracture; S72.002A-Fracture of unspecified part of neck of left femur, initial encounter for closed fracture.  Comparison: CT pelvis dated 4/11/2023  Technique: Axial CT images were obtained of the left lower extremity without contrast administration.  Reconstructed coronal and sagittal images were also obtained. Automated exposure control and iterative construction methods were used.   Findings: There is a comminuted, intra-articular fracture of the left femoral head. The femoral head appears smaller than typical. This may be secondary to mechanical erosion of the femoral head within the acetabulum. There is a vertically oriented fracture line extending into the femoral neck laterally. Superior to this, there is a rounded defect within the lateral femoral neck suspected to represent a mechanical erosion secondary to articulation with the acetabular rim. The femoral neck/shaft is displaced superiorly in relation to the femoral head with the lateral femoral neck abutting the lateral acetabular rim. There is a small mechanical erosion involving the anterior acetabular roof measuring 0.7 cm transverse. Multiple ossific fragments/loose bodies are noted within the hip joint measuring up to 1.1 cm. There is complete loss of joint space superiorly.  Severe degenerative disc changes are noted at L5-S1.  Visualized pelvic viscera appear normal.      Impression: 1.  Comminuted, intra-articular fracture of the left femoral head which is suspected to be subacute to chronic in age. It is new since the pelvis radiograph dated 12/21/2023. No significant callus formation is noted. 2.  There are suspected mechanical erosions of the femoral head, femoral neck and anterior acetabular rim, as described above. 3.  Superior migration of the  femoral neck with pseudoarticulation of the femoral neck and lateral acetabular rim. 4.  Complete loss of the superior joint space 5.  Severe degenerative disc disease at L5-S1. 6.  Numerous foci of abnormal mineralization in the left hip likely related to the previously described mechanical erosion. Larger loose bodies around the joint measuring up to 1.1 cm are also noted.   Electronically Signed By-Ibrahima Rose MD On:5/9/2024 10:19 PM      XR Hip With or Without Pelvis 2 - 3 View Left    Result Date: 5/9/2024  XR HIP W OR WO PELVIS 2-3 VIEW LEFT-  Date of Exam: 5/9/2024 6:34 PM  Indication: pain  Comparison: 2 view right hip dated 2/22/2024  Findings: There has been a previous fracture of the left subcapital femoral neck with some extension into the femoral head. With relation to the acetabulum and femoral head, the femoral neck is displaced superiorly approximately 1.9 cm. The fracture line is vaguely seen.  There is complete loss of the joint space in the superior left hip joint.             There are mild degenerative changes of the right hip joint. Osseous structures otherwise appear unremarkable . Moderate to severe degenerative disc changes are noted in the lower lumbar spine.      Impression: 1.  Moderate deformity of the left femoral head and neck favored to be secondary to a subcapital femoral neck fracture with some extension into the femoral head. There is resulting malalignment of the femoral head and neck, as discussed above. The vagueness of the fracture line suggest the possibility that the fracture is subacute. It is new since 12/21/2023.   Electronically Signed By-Ibrahima Rose MD On:5/9/2024 7:42 PM      XR Chest 1 View    Result Date: 5/9/2024  XR CHEST 1 VW-  Date of Exam: 5/9/2024 5:43 PM  Indication: pre op  Comparison: Chest AP dated 11/27/2022  Findings: The lungs are clear bilaterally. The cardiac and mediastinal silhouettes appear normal. No effusion is seen.      Impression: 1.   No acute cardiopulmonary disease.   Electronically Signed By-Ibrahima Rose MD On:5/9/2024 6:46 PM         LAB RESULTS:      Lab 05/13/24  0319 05/12/24  0331 05/11/24  0310 05/10/24  0259 05/09/24  1811   WBC 8.67 11.45* 9.50 10.18 11.83*   HEMOGLOBIN 7.6* 8.1* 10.1* 13.0 13.9   HEMATOCRIT 23.2* 24.3* 31.7* 39.5 41.2   PLATELETS 151 133* 195 267 287   NEUTROS ABS 5.74 7.85* 8.01* 6.63 6.79   IMMATURE GRANS (ABS) 0.02 0.06* 0.05 0.04 0.03   LYMPHS ABS 1.88 2.56 0.74 2.46 3.82*   MONOS ABS 0.80 0.84 0.69 0.95* 1.01*   EOS ABS 0.20 0.12 0.00 0.07 0.16   MCV 93.2 91.4 94.1 92.5 91.4   PROTIME  --   --   --   --  14.1   APTT  --   --   --   --  27.1         Lab 05/13/24  0319 05/12/24  0331 05/11/24  0310 05/10/24  0259 05/09/24  1811   SODIUM 140 134* 136 137 140   POTASSIUM 4.0 3.9 4.5 4.2 3.7   CHLORIDE 106 100 103 104 103   CO2 26.8 26.0 25.2 26.0 25.1   ANION GAP 7.2 8.0 7.8 7.0 11.9   BUN 18 21 21 23 22   CREATININE 0.72 0.77 0.87 0.70 0.76   EGFR 84.1 77.6 67.0 87.0 78.8   GLUCOSE 122* 135* 163* 127* 116*   CALCIUM 8.4* 8.9 8.4* 9.4 9.7   MAGNESIUM 1.9 1.9 1.9  --   --    PHOSPHORUS 1.9* 3.4 4.8*  --   --          Lab 05/13/24 0319 05/12/24 0331 05/11/24 0310 05/09/24  1811   TOTAL PROTEIN 5.2* 5.0* 5.4* 7.1   ALBUMIN 2.8* 3.0* 3.0* 4.1   GLOBULIN  --   --   --  3.0   ALT (SGPT) 12 12 11 14   AST (SGOT) 46* 41* 17 17   BILIRUBIN 0.2 0.2 0.3 0.4   BILIRUBIN DIRECT <0.2 <0.2 <0.2  --    ALK PHOS 78 71 76 99         Lab 05/09/24  1811   PROTIME 14.1   INR 1.07                 Brief Urine Lab Results  (Last result in the past 365 days)        Color   Clarity   Blood   Leuk Est   Nitrite   Protein   CREAT   Urine HCG        05/12/24 0834 Yellow   Clear   Negative   Negative   Negative   Negative                 Microbiology Results (last 10 days)       ** No results found for the last 240 hours. **            XR Hip With or Without Pelvis 2 - 3 View Left    Result Date: 5/10/2024  Impression: Impression: 1.  Status  post left total hip arthroplasty without evidence of complication   Electronically Signed By-Ibrahima Rose MD On:5/10/2024 9:04 PM      CT Lower Extremity Left Without Contrast    Result Date: 5/9/2024  Impression: Impression: 1.  Comminuted, intra-articular fracture of the left femoral head which is suspected to be subacute to chronic in age. It is new since the pelvis radiograph dated 12/21/2023. No significant callus formation is noted. 2.  There are suspected mechanical erosions of the femoral head, femoral neck and anterior acetabular rim, as described above. 3.  Superior migration of the femoral neck with pseudoarticulation of the femoral neck and lateral acetabular rim. 4.  Complete loss of the superior joint space 5.  Severe degenerative disc disease at L5-S1. 6.  Numerous foci of abnormal mineralization in the left hip likely related to the previously described mechanical erosion. Larger loose bodies around the joint measuring up to 1.1 cm are also noted.   Electronically Signed By-Ibrahima Rose MD On:5/9/2024 10:19 PM      XR Hip With or Without Pelvis 2 - 3 View Left    Result Date: 5/9/2024  Impression: Impression: 1.  Moderate deformity of the left femoral head and neck favored to be secondary to a subcapital femoral neck fracture with some extension into the femoral head. There is resulting malalignment of the femoral head and neck, as discussed above. The vagueness of the fracture line suggest the possibility that the fracture is subacute. It is new since 12/21/2023.   Electronically Signed By-Ibrahima Rose MD On:5/9/2024 7:42 PM      XR Chest 1 View    Result Date: 5/9/2024  Impression: Impression: 1.  No acute cardiopulmonary disease.   Electronically Signed By-Ibrahima Rose MD On:5/9/2024 6:46 PM                   Labs Pending at Discharge:  Pending Labs       Order Current Status    Tissue Pathology Exam In process              Time spent on Discharge including face to face service:  35  minutes    Electronically signed by Edmond Sheppard MD, 05/13/24, 4:49 PM EDT.    Portions of this documentation were transcribed electronically from a voice recognition software.  I confirm all data accurately represents the service(s) I performed at today's visit.

## 2024-05-13 NOTE — PLAN OF CARE
Goal Outcome Evaluation:         Pt is transferring to SNF Pt is alert and oriented. Pt is assist x 1. Family is at bedside. No other issues to note

## 2024-05-13 NOTE — THERAPY TREATMENT NOTE
Acute Care - Physical Therapy Treatment Note   [place]     Patient Name: Laura Cardenas  : 1940  MRN: [id number]  Today's Date: 2024      Visit Dx:     ICD-10-CM ICD-9-CM   1. Closed fracture of left hip, initial encounter  S72.002A 820.8   2. Closed fracture of neck of left femur, initial encounter  S72.002A 820.8   3. Impaired mobility and ADLs  Z74.09 V49.89    Z78.9    4. Difficulty walking  R26.2 719.7     Patient Active Problem List   Diagnosis    Gastro-esophageal reflux disease without esophagitis    Mixed hyperlipidemia    Impaired fasting glucose    Vitamin D deficiency    Primary osteoarthritis involving multiple joints    Age-related osteoporosis without current pathological fracture    Suprapubic fullness    Medicare annual wellness visit, subsequent    Mixed stress and urge urinary incontinence    Non-seasonal allergic rhinitis due to pollen    History of DVT of lower extremity    Left lumbar radiculopathy    Hip fx, left, closed, initial encounter    Closed fracture of left hip    Physical debility     Past Medical History:   Diagnosis Date    Acute embolism and thrombosis of deep vein of left lower extremity     Age-related osteoporosis without current pathological fracture     Disorder of bone, unspecified     Diverticulosis     DJD (degenerative joint disease)     GERD without esophagitis     History of transfusion     61 years ago for childbirth    Mixed hyperlipidemia     Primary generalized (osteo)arthritis     Spinal headache      Past Surgical History:   Procedure Laterality Date    BACK SURGERY      BACK SURGERY      S/P Lumbar    BREAST BIOPSY      Breast Bx (83 & 98)    CATARACT EXTRACTION      2015 L Cataract 2015 R Cataract    COLONOSCOPY  2017    DR. AWAD    COLONOSCOPY N/A 2021    Procedure: COLONOSCOPY, WITH HOT SNARE POLYPECTOMY,;  Surgeon: Tony Thomas MD;  Location: [facility] ENDOSCOPY;  Service: Gastroenterology;  Laterality: N/A;   DIVERTICULOSIS, COLON POLYP    REPLACEMENT TOTAL KNEE Bilateral     04/10/2019 right knee replacement 01/2020 knee replacement left    TONSILLECTOMY      10 YEARS OLD    TOTAL HIP ARTHROPLASTY Left 5/10/2024    Procedure: TOTAL HIP ARTHROPLASTY;  Surgeon: Ivan Cruz MD;  Location: [facility] MAIN OR;  Service: Orthopedics;  Laterality: Left;     PT Assessment (Last 12 Hours)       PT Evaluation and Treatment       Row Name 05/13/24 1028          Physical Therapy Time and Intention    Subjective Information no complaints  -VK     Document Type therapy note (daily note)  -VK     Mode of Treatment individual therapy;physical therapy  -VK     Patient Effort good  -VK       Row Name 05/13/24 1028          General Information    Patient Profile Reviewed yes  -VK     Existing Precautions/Restrictions fall;weight bearing  -VK       [facility] Name 05/13/24 1028          Cognition    Affect/Mental Status (Cognition) WNL  -VK     Orientation Status (Cognition) oriented x 3  -VK     Personal Safety Interventions nonskid shoes/slippers when out of bed;gait belt;fall prevention program maintained  -VK       [facility] Name 05/13/24 1028          Mobility    Extremity Weight-bearing Status left lower extremity  -VK     Left Lower Extremity (Weight-bearing Status) weight-bearing as tolerated (WBAT)  -VK       [facility] Name 05/13/24 1028          Bed Mobility    Supine-Sit [place] (Bed Mobility) contact guard  -VK     Assistive Device (Bed Mobility) bed rails;head of bed elevated  -VK       [facility] Name 05/13/24 1028          Sit-Stand Transfer    Sit-Stand [place] (Transfers) standby assist;contact guard  -VK     Assistive Device (Sit-Stand Transfers) walker, front-wheeled  -VK       [facility] Name 05/13/24 1028          Stand-Sit Transfer    Stand-Sit [place] (Transfers) standby assist;contact guard  -VK     Assistive Device (Stand-Sit Transfers) walker, front-wheeled  -VK       [facility] Name 05/13/24 1028          Gait/Stairs (Locomotion)     [place] (Gait) contact guard  -VK     Assistive Device (Gait) walker, front-wheeled  -VK     Patient was able to Ambulate yes  -VK     Distance in Feet (Gait) --  011aca6  -VK     Pattern (Gait) 4-point;step-through  -VK     Deviations/Abnormal Patterns (Gait) gait speed decreased;stride length decreased  -VK     Bilateral Gait Deviations forward flexed posture  -VK     Left Sided Gait Deviations weight shift ability decreased;heel strike decreased  -VK       Row Name 05/13/24 1028          Safety Issues, Functional Mobility    Impairments Affecting Function (Mobility) balance;endurance/activity tolerance;pain  -VK       Row Name 05/13/24 1028          Balance    Dynamic Standing Balance contact guard  -VK     Position/Device Used, Standing Balance walker, front-wheeled  -VK       Row Name 05/13/24 1028          Hip (Therapeutic Exercise)    Hip AAROM (Therapeutic Exercise) bilateral;flexion;10 repetitions;2 sets  -VK       Row Name 05/13/24 1028          Knee (Therapeutic Exercise)    Knee (Therapeutic Exercise) strengthening exercise  -VK     Knee Strengthening (Therapeutic Exercise) bilateral;LAQ (long arc quad);10 repetitions;2 sets  -VK       Row Name 05/13/24 1028          Ankle (Therapeutic Exercise)    Ankle AROM (Therapeutic Exercise) bilateral;dorsiflexion;20 repititions  -VK       Row Name             Wound 05/10/24 1832 Left anterior greater trochanter Incision    Wound - Properties Group Placement Date: 05/10/24  -HK Placement Time: 1832 -HK Side: Left  -HK Orientation: anterior  -HK Location: greater trochanter  -HK Primary Wound Type: Incision  -HK    Retired Wound - Properties Group Placement Date: 05/10/24  -HK Placement Time: 1832 -HK Side: Left  -HK Orientation: anterior  -HK Location: greater trochanter  -HK Primary Wound Type: Incision  -HK    Retired Wound - Properties Group Date first assessed: 05/10/24  -HK Time first assessed: 1832 -HK Side: Left  -HK Location: greater  trochanter  -HK Primary Wound Type: Incision  -HK      Row Name 05/13/24 1028          Positioning and Restraints    Pre-Treatment Position in bed  -VK     Post Treatment Position chair  -VK     In Chair reclined;call light within reach;encouraged to call for assist;exit alarm on;with family/caregiver  -VK       Row Name 05/13/24 1028          Progress Summary (PT)    Progress Toward Functional Goals (PT) progress toward functional goals is good  -               User Key  (r) = Recorded By, (t) = Taken By, (c) = Cosigned By      Initials Name Provider Type    Starr Frost, RN Registered Nurse    Kat Fernandez PTA Physical Therapist Assistant                    Physical Therapy Education       Title: PT OT SLP Therapies (Done)       Topic: Physical Therapy (Done)       Point: Mobility training (Done)       Learning Progress Summary             Patient Acceptance, E, VU by  at 5/11/2024 1436                         Point: Home exercise program (Done)       Learning Progress Summary             Patient Acceptance, E, VU by  at 5/11/2024 1436                         Point: Body mechanics (Done)       Learning Progress Summary             Patient Acceptance, E, VU by  at 5/11/2024 1436                         Point: Precautions (Done)       Learning Progress Summary             Patient Acceptance, E, VU by  at 5/11/2024 1436                                         User Key       Initials Effective Dates Name Provider Type [facility] 05/08/23 -  Hernandez Blount, PT Physical Therapist PT                  PT Recommendation and Plan     Progress Summary (PT)  Progress Toward Functional Goals (PT): progress toward functional goals is good   Outcome Measures       Row Name 05/13/24 1200 05/12/24 0800 05/11/24 1400       How much help from another person do you currently need...    Turning from your back to your side while in flat bed without using bedrails? 4  -VK 4  -CS 3  -JH    Moving from lying on  back to sitting on the side of a flat bed without bedrails? 3  -VK 3  -CS 3  -JH    Moving to and from a bed to a chair (including a wheelchair)? 3  -VK 3  -CS 3  -JH    Standing up from a chair using your arms (e.g., wheelchair, bedside chair)? 4  -VK 4  -CS 3  -JH    Climbing 3-5 steps with a railing? 3  -VK 3  -CS 2  -JH    To walk in hospital room? 3  -VK 3  -CS 3  -JH    AM-PAC 6 Clicks Score (PT) 20  -VK 20  -CS 17  -JH    Highest Level of Mobility Goal 6 --> Walk 10 steps or more  -VK 6 --> Walk 10 steps or more  -CS 5 --> Static standing  -JH       Functional Assessment    Outcome Measure Options -- AM-PAC 6 Clicks Basic Mobility (PT)  -CS AM-PAC 6 Clicks Basic Mobility (PT)  -              User Key  (r) = Recorded By, (t) = Taken By, (c) = Cosigned By      Initials Name Provider Type     Vinnie Koo PTA Physical Therapist Assistant     Hernandez Blount, PT Physical Therapist     Kat Price PTA Physical Therapist Assistant                     Time Calculation:    PT Charges       Row Name 05/13/24 1217             Time Calculation    PT Received On 05/13/24  -VK         Timed Charges    11734 - PT Therapeutic Exercise Minutes 10  -VK      58001 - Gait Training Minutes  10  -VK      68364 - PT Therapeutic Activity Minutes 4  -VK         Total Minutes    Timed Charges Total Minutes 24  -VK       Total Minutes 24  -VK                User Key  (r) = Recorded By, (t) = Taken By, (c) = Cosigned By      Initials Name Provider Type     Kat Price PTA Physical Therapist Assistant                  Therapy Charges for Today       Code Description Service Date Service Provider Modifiers Qty    74945439943 HC PT THER PROC EA 15 MIN 5/13/2024 Kat Price PTA GP 1    66121802806 HC GAIT TRAINING EA 15 MIN 5/13/2024 Kat Price PTA GP 1            PT G-Codes  Outcome Measure Options: AM-PAC 6 Clicks Basic Mobility (PT)  AM-PAC 6 Clicks Score (PT): 20  AM-PAC 6 Clicks Score (OT): 21    Kat  Albert, PTA  5/13/2024

## 2024-05-13 NOTE — PROGRESS NOTES
[facility]     Progress Note    Patient Name: Laura Cardenas  : 1940  MRN: [id number]  Primary Care Physician:  Ari Brady MD  Date of admission: 2024    Subjective   Subjective     HPI:  Pain well-controlled.  Up and ambulatory with PT.  Denies chest pain or shortness of breath.  She is interested in rehab placement.  Review of Systems   See HPI    Objective   Objective     Vitals:   Temp:  [98.2 °F (36.8 °C)-98.8 °F (37.1 °C)] 98.8 °F (37.1 °C)  Heart Rate:  [] 108  Resp:  [18] 18  BP: ()/(48-69) 129/54  Physical Exam    General: Alert, no acute distress   Left lower extremity: Aquacel in place.  Mild blood spotting.  Stable appearing bruising and swelling.  Calf nontender.  Leg lengths symmetric.  Distal neurovascular intact.  No pain with logroll.    Result Review      WBC   Date Value Ref Range Status   2024 8.67 3.40 - 10.80 10*3/mm3 Final     RBC   Date Value Ref Range Status   2024 2.49 (L) 3.77 - 5.28 10*6/mm3 Final     Hemoglobin   Date Value Ref Range Status   2024 7.6 (L) 12.0 - 15.9 g/dL Final     Hematocrit   Date Value Ref Range Status   2024 23.2 (L) 34.0 - 46.6 % Final     MCV   Date Value Ref Range Status   2024 93.2 79.0 - 97.0 fL Final     MCH   Date Value Ref Range Status   2024 30.5 26.6 - 33.0 pg Final     MCHC   Date Value Ref Range Status   2024 32.8 31.5 - 35.7 g/dL Final     RDW   Date Value Ref Range Status   2024 13.1 12.3 - 15.4 % Final     RDW-SD   Date Value Ref Range Status   2024 43.6 37.0 - 54.0 fl Final     MPV   Date Value Ref Range Status   2024 9.9 6.0 - 12.0 fL Final     Platelets   Date Value Ref Range Status   2024 151 140 - 450 10*3/mm3 Final     Neutrophil %   Date Value Ref Range Status   2024 66.3 42.7 - 76.0 % Final     Lymphocyte %   Date Value Ref Range Status   2024 21.7 19.6 - 45.3 % Final     Monocyte %   Date Value Ref Range Status   2024 9.2 5.0 -  12.0 % Final     Eosinophil %   Date Value Ref Range Status   05/13/2024 2.3 0.3 - 6.2 % Final     Basophil %   Date Value Ref Range Status   05/13/2024 0.3 0.0 - 1.5 % Final     Immature Grans %   Date Value Ref Range Status   05/13/2024 0.2 0.0 - 0.5 % Final     Neutrophils, Absolute   Date Value Ref Range Status   05/13/2024 5.74 1.70 - 7.00 10*3/mm3 Final     Lymphocytes, Absolute   Date Value Ref Range Status   05/13/2024 1.88 0.70 - 3.10 10*3/mm3 Final     Monocytes, Absolute   Date Value Ref Range Status   05/13/2024 0.80 0.10 - 0.90 10*3/mm3 Final     Eosinophils, Absolute   Date Value Ref Range Status   05/13/2024 0.20 0.00 - 0.40 10*3/mm3 Final     Basophils, Absolute   Date Value Ref Range Status   05/13/2024 0.03 0.00 - 0.20 10*3/mm3 Final     Immature Grans, Absolute   Date Value Ref Range Status   05/13/2024 0.02 0.00 - 0.05 10*3/mm3 Final     nRBC   Date Value Ref Range Status   05/13/2024 0.2 0.0 - 0.2 /100 WBC Final        Result Review:  I have personally reviewed the results from the time of this admission to 5/13/2024 09:03 EDT and agree with these findings:  [x]  Laboratory  []  Microbiology  [x]  Radiology  []  EKG/Telemetry   []  Cardiology/Vascular   []  Pathology  []  Old records  []  Other:      Assessment & Plan   Assessment / Plan     Brief Patient Summary:  Laura Cardenas is a 83 y.o. 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  2-week orthopedic follow-up after discharge.         DVT prophylaxis:  Mechanical DVT prophylaxis orders are  present.        CODE STATUS:   Level Of Support Discussed With: Patient  Code Status (Patient has no pulse and is not breathing): CPR (Attempt to Resuscitate)  Medical Interventions (Patient has pulse or is breathing): Full Support      Electronically signed by Ivan Cruz MD, 05/13/24, 9:04 AM EDT.

## 2024-05-14 LAB
ALBUMIN SERPL-MCNC: 2.9 G/DL (ref 3.5–5.2)
ALP SERPL-CCNC: 86 U/L (ref 39–117)
ALT SERPL W P-5'-P-CCNC: 15 U/L (ref 1–33)
ANION GAP SERPL CALCULATED.3IONS-SCNC: 6.1 MMOL/L (ref 5–15)
AST SERPL-CCNC: 51 U/L (ref 1–32)
BASOPHILS # BLD AUTO: 0.04 10*3/MM3 (ref 0–0.2)
BASOPHILS NFR BLD AUTO: 0.4 % (ref 0–1.5)
BILIRUB CONJ SERPL-MCNC: <0.2 MG/DL (ref 0–0.3)
BILIRUB INDIRECT SERPL-MCNC: ABNORMAL MG/DL
BILIRUB SERPL-MCNC: 0.4 MG/DL (ref 0–1.2)
BUN SERPL-MCNC: 13 MG/DL (ref 8–23)
BUN/CREAT SERPL: 21.3 (ref 7–25)
CALCIUM SPEC-SCNC: 8.8 MG/DL (ref 8.6–10.5)
CHLORIDE SERPL-SCNC: 105 MMOL/L (ref 98–107)
CO2 SERPL-SCNC: 26.9 MMOL/L (ref 22–29)
CREAT SERPL-MCNC: 0.61 MG/DL (ref 0.57–1)
CYTO UR: NORMAL
DEPRECATED RDW RBC AUTO: 46.8 FL (ref 37–54)
EGFRCR SERPLBLD CKD-EPI 2021: 89.9 ML/MIN/1.73
EOSINOPHIL # BLD AUTO: 0.31 10*3/MM3 (ref 0–0.4)
EOSINOPHIL NFR BLD AUTO: 2.8 % (ref 0.3–6.2)
ERYTHROCYTE [DISTWIDTH] IN BLOOD BY AUTOMATED COUNT: 13.5 % (ref 12.3–15.4)
GLUCOSE BLDC GLUCOMTR-MCNC: 115 MG/DL (ref 70–99)
GLUCOSE BLDC GLUCOMTR-MCNC: 119 MG/DL (ref 70–99)
GLUCOSE BLDC GLUCOMTR-MCNC: 124 MG/DL (ref 70–99)
GLUCOSE BLDC GLUCOMTR-MCNC: 129 MG/DL (ref 70–99)
GLUCOSE SERPL-MCNC: 120 MG/DL (ref 65–99)
HCT VFR BLD AUTO: 23.7 % (ref 34–46.6)
HGB BLD-MCNC: 7.6 G/DL (ref 12–15.9)
IMM GRANULOCYTES # BLD AUTO: 0.05 10*3/MM3 (ref 0–0.05)
IMM GRANULOCYTES NFR BLD AUTO: 0.5 % (ref 0–0.5)
LAB AP CASE REPORT: NORMAL
LAB AP CLINICAL INFORMATION: NORMAL
LYMPHOCYTES # BLD AUTO: 3.49 10*3/MM3 (ref 0.7–3.1)
LYMPHOCYTES NFR BLD AUTO: 31.9 % (ref 19.6–45.3)
MAGNESIUM SERPL-MCNC: 2 MG/DL (ref 1.6–2.4)
MCH RBC QN AUTO: 30.5 PG (ref 26.6–33)
MCHC RBC AUTO-ENTMCNC: 32.1 G/DL (ref 31.5–35.7)
MCV RBC AUTO: 95.2 FL (ref 79–97)
MONOCYTES # BLD AUTO: 1.03 10*3/MM3 (ref 0.1–0.9)
MONOCYTES NFR BLD AUTO: 9.4 % (ref 5–12)
NEUTROPHILS NFR BLD AUTO: 55 % (ref 42.7–76)
NEUTROPHILS NFR BLD AUTO: 6.02 10*3/MM3 (ref 1.7–7)
NRBC BLD AUTO-RTO: 0.2 /100 WBC (ref 0–0.2)
PATH REPORT.FINAL DX SPEC: NORMAL
PATH REPORT.GROSS SPEC: NORMAL
PHOSPHATE SERPL-MCNC: 2.6 MG/DL (ref 2.5–4.5)
PLATELET # BLD AUTO: 182 10*3/MM3 (ref 140–450)
PMV BLD AUTO: 9.5 FL (ref 6–12)
POTASSIUM SERPL-SCNC: 3.8 MMOL/L (ref 3.5–5.2)
PROT SERPL-MCNC: 5.5 G/DL (ref 6–8.5)
RBC # BLD AUTO: 2.49 10*6/MM3 (ref 3.77–5.28)
SODIUM SERPL-SCNC: 138 MMOL/L (ref 136–145)
WBC NRBC COR # BLD AUTO: 10.94 10*3/MM3 (ref 3.4–10.8)

## 2024-05-14 PROCEDURE — 97116 GAIT TRAINING THERAPY: CPT

## 2024-05-14 PROCEDURE — 82948 REAGENT STRIP/BLOOD GLUCOSE: CPT | Performed by: FAMILY MEDICINE

## 2024-05-14 PROCEDURE — 97535 SELF CARE MNGMENT TRAINING: CPT

## 2024-05-14 PROCEDURE — 99306 1ST NF CARE HIGH MDM 50: CPT | Performed by: PHYSICIAN ASSISTANT

## 2024-05-14 PROCEDURE — 80076 HEPATIC FUNCTION PANEL: CPT | Performed by: FAMILY MEDICINE

## 2024-05-14 PROCEDURE — 97161 PT EVAL LOW COMPLEX 20 MIN: CPT

## 2024-05-14 PROCEDURE — 80048 BASIC METABOLIC PNL TOTAL CA: CPT | Performed by: FAMILY MEDICINE

## 2024-05-14 PROCEDURE — 97110 THERAPEUTIC EXERCISES: CPT

## 2024-05-14 PROCEDURE — 83735 ASSAY OF MAGNESIUM: CPT | Performed by: FAMILY MEDICINE

## 2024-05-14 PROCEDURE — 85025 COMPLETE CBC W/AUTO DIFF WBC: CPT | Performed by: FAMILY MEDICINE

## 2024-05-14 PROCEDURE — 97166 OT EVAL MOD COMPLEX 45 MIN: CPT

## 2024-05-14 PROCEDURE — 82948 REAGENT STRIP/BLOOD GLUCOSE: CPT

## 2024-05-14 PROCEDURE — 94760 N-INVAS EAR/PLS OXIMETRY 1: CPT

## 2024-05-14 PROCEDURE — 94799 UNLISTED PULMONARY SVC/PX: CPT

## 2024-05-14 PROCEDURE — 84100 ASSAY OF PHOSPHORUS: CPT | Performed by: FAMILY MEDICINE

## 2024-05-14 PROCEDURE — 97530 THERAPEUTIC ACTIVITIES: CPT

## 2024-05-14 RX ADMIN — SENNOSIDES AND DOCUSATE SODIUM 2 TABLET: 50; 8.6 TABLET ORAL at 08:59

## 2024-05-14 RX ADMIN — ESCITALOPRAM OXALATE 10 MG: 10 TABLET ORAL at 08:59

## 2024-05-14 RX ADMIN — PANTOPRAZOLE SODIUM 40 MG: 40 TABLET, DELAYED RELEASE ORAL at 06:19

## 2024-05-14 RX ADMIN — Medication 600 MG: at 08:59

## 2024-05-14 RX ADMIN — GABAPENTIN 300 MG: 300 CAPSULE ORAL at 08:59

## 2024-05-14 RX ADMIN — MONTELUKAST 10 MG: 10 TABLET, FILM COATED ORAL at 20:41

## 2024-05-14 RX ADMIN — FERROUS SULFATE TAB 325 MG (65 MG ELEMENTAL FE) 325 MG: 325 (65 FE) TAB at 08:59

## 2024-05-14 RX ADMIN — PRAVASTATIN SODIUM 40 MG: 20 TABLET ORAL at 20:41

## 2024-05-14 RX ADMIN — GABAPENTIN 300 MG: 300 CAPSULE ORAL at 20:40

## 2024-05-14 RX ADMIN — ASPIRIN 325 MG: 325 TABLET ORAL at 08:59

## 2024-05-14 RX ADMIN — Medication 600 MG: at 17:39

## 2024-05-14 RX ADMIN — OXYBUTYNIN CHLORIDE 10 MG: 5 TABLET, EXTENDED RELEASE ORAL at 08:59

## 2024-05-14 RX ADMIN — ASPIRIN 325 MG: 325 TABLET ORAL at 20:41

## 2024-05-14 NOTE — H&P
[facility]   HOSPITALIST HISTORY AND PHYSICAL  Date: 2024   Patient Name: Laura Cardenas  : 1940  MRN: [id number]  Primary Care Physician:  Ari Brady MD  Date of admission: 2024    Subjective   Subjective     Chief Complaint: Weakness    HPI:    Laura Cardenas is a 83 y.o. female past medical history significant for mixed stress and urge incontinence, hyperlipidemia, prediabetes, history of osteoporosis that initially presents to the ED with hip pain patient was noted to have left hip fracture admitted to the hospitalist service orthopedic surgery consulted patient underwent left total hip arthroplasty on 5/10/2024 by Dr. Cruz without complications postoperative course unremarkable seen by PT and OT deemed a good candidate for inpatient rehab is been transferred to skilled nursing facility at [facility]      Personal History     Past Medical History:  Past Medical History:   Diagnosis Date    Acute embolism and thrombosis of deep vein of left lower extremity     Age-related osteoporosis without current pathological fracture     Disorder of bone, unspecified     Diverticulosis     DJD (degenerative joint disease)     GERD without esophagitis     History of transfusion     61 years ago for childbirth    Mixed hyperlipidemia     Primary generalized (osteo)arthritis     Spinal headache        Past Surgical History:  Past Surgical History:   Procedure Laterality Date    BACK SURGERY      BACK SURGERY      S/P Lumbar    BREAST BIOPSY      Breast Bx (83 & 98)    CATARACT EXTRACTION      2015 L Cataract 2015 R Cataract    COLONOSCOPY      DR. AWAD    COLONOSCOPY N/A 2021    Procedure: COLONOSCOPY, WITH HOT SNARE POLYPECTOMY,;  Surgeon: Tony Thomas MD;  Location: [facility] ENDOSCOPY;  Service: Gastroenterology;  Laterality: N/A;  DIVERTICULOSIS, COLON POLYP    REPLACEMENT TOTAL KNEE Bilateral     04/10/2019 right knee replacement 2020 knee replacement left     TONSILLECTOMY      10 YEARS OLD    TOTAL HIP ARTHROPLASTY Left 5/10/2024    Procedure: TOTAL HIP ARTHROPLASTY;  Surgeon: Ivan Cruz MD;  Location: [facility] MAIN OR;  Service: Orthopedics;  Laterality: Left;        Family History:   Family History   Problem Relation Age of Onset    Cancer Father     Cancer Brother     [place] Hyperthermia Neg Hx         Social History:   Social History     Socioeconomic History    Marital status:    Tobacco Use    Smoking status: Former     Current packs/day: 0.00     Average packs/day: 0.5 packs/day for 39.0 years (19.5 ttl pk-yrs)     Types: Cigarettes     Start date: 1957     Quit date: 1996     Years since quittin.6    Smokeless tobacco: Never   Vaping Use    Vaping status: Never Used   Substance and Sexual Activity    Alcohol use: Never    Drug use: Never    Sexual activity: Defer        Home Medications:  Biotin, Calcium Carbonate, Magnesium, Vibegron, diclofenac, escitalopram, esomeprazole, estradiol, gabapentin, meloxicam, multivitamin, and pravastatin    Allergies:  Allergies   Allergen Reactions    Apixaban Hives       Review of Systems   All systems were reviewed and negative except for: Generalized weakness fatigue    Objective   Objective     Vitals:   Temp:  [98.6 °F (37 °C)-98.9 °F (37.2 °C)] 98.6 °F (37 °C)  Heart Rate:  [90-93] 93  Resp:  [16-18] 16  BP: (120-133)/(44-45) 120/45  Flow (L/min):  [2] 2    Physical Exam   Constitutional: Awake alert oriented no acute distress  Respiratory: Clear breath sounds noted bilaterally  Cardiovascular: RRR  GI: Abdomen soft nontender bowel sounds present  Extremities left lower extremity neurovascularly intact    Result Review    Result Review:  I have personally reviewed the results from the time of this admission to 2024 15:27 EDT and agree with these findings:  []  Laboratory  []  Microbiology  []  Radiology  []  EKG/Telemetry   []  Cardiology/Vascular   []  Pathology  []  Old records  []   Other:      Assessment & Plan   Assessment / Plan   Assessment:    Hospital-acquired weakness  Left femoral head and neck fracture likely secondary to traumatic    fall  with underlying osteoporosis  Chronic left lumbar radiculopathy  History of osteoporosis  Prediabetes  Dyslipidemia  Mixed stress and urge urinary incontinence     Plan    Continue daily PT and OT per protocol  /Scale insulin per protocol  Aspirin 325 mg daily for DVT prophylaxis  Continue with PPI for GI prophylaxis  Continuing with Ditropan for stress/urge incontinence  Further treatment contingent upon her hospital course  Discussed with RN      DVT prophylaxis:  No DVT prophylaxis order currently exists.        CODE STATUS:    Level Of Support Discussed With: Patient  Code Status (Patient has no pulse and is not breathing): CPR (Attempt to Resuscitate)  Medical Interventions (Patient has pulse or is breathing): Full Support        Electronically signed by BIANCA Zamora, 05/14/24, 3:27 PM EDT.

## 2024-05-14 NOTE — PLAN OF CARE
Goal Outcome Evaluation:  Plan of Care Reviewed With: patient        Progress: no change  Outcome Evaluation: AOx4, call light and personal items within reach. Able to make needs known to staffing. No c/o pain during the shift. OT/PT eval done today. 1x to the BR with walker. Postional changes done independently. Con't plan of care.

## 2024-05-14 NOTE — THERAPY EVALUATION
SNF - Occupational Therapy Initial Evaluation and Treatment Note  MARCELINO Canela    Patient Name: Laura Cardenas  : 1940    MRN: [id number]                              Today's Date: 2024       Admit Date: 2024    Visit Dx:     ICD-10-CM ICD-9-CM   1. Decreased activities of daily living (ADL)  Z78.9 V49.89     Patient Active Problem List   Diagnosis    Gastro-esophageal reflux disease without esophagitis    Mixed hyperlipidemia    Impaired fasting glucose    Vitamin D deficiency    Primary osteoarthritis involving multiple joints    Age-related osteoporosis without current pathological fracture    Suprapubic fullness    Medicare annual wellness visit, subsequent    Mixed stress and urge urinary incontinence    Non-seasonal allergic rhinitis due to pollen    History of DVT of lower extremity    Left lumbar radiculopathy    Hip fx, left, closed, initial encounter    Closed fracture of left hip    Physical debility     Past Medical History:   Diagnosis Date    Acute embolism and thrombosis of deep vein of left lower extremity     Age-related osteoporosis without current pathological fracture     Disorder of bone, unspecified     Diverticulosis     DJD (degenerative joint disease)     GERD without esophagitis     History of transfusion     59 years ago for childbirth    Mixed hyperlipidemia     Primary generalized (osteo)arthritis     Spinal headache      Past Surgical History:   Procedure Laterality Date    BACK SURGERY      BACK SURGERY      S/P Lumbar    BREAST BIOPSY      Breast Bx (81 & 98)    CATARACT EXTRACTION      2015 L Cataract 2015 R Cataract    COLONOSCOPY      DR. AWAD    COLONOSCOPY N/A 2021    Procedure: COLONOSCOPY, WITH HOT SNARE POLYPECTOMY,;  Surgeon: Tony Thomas MD;  Location: [facility] ENDOSCOPY;  Service: Gastroenterology;  Laterality: N/A;  DIVERTICULOSIS, COLON POLYP    REPLACEMENT TOTAL KNEE Bilateral     04/10/2019 right knee replacement  01/2020 knee replacement left    TONSILLECTOMY      10 YEARS OLD    TOTAL HIP ARTHROPLASTY Left 5/10/2024    Procedure: TOTAL HIP ARTHROPLASTY;  Surgeon: Ivan Cruz MD;  Location: [facility] MAIN OR;  Service: Orthopedics;  Laterality: Left;      General Information       Row Name 05/14/24 1152 05/14/24 1142       OT Time and Intention    Document Type therapy note (daily note)  -GC evaluation  -GC    Mode of Treatment individual therapy;occupational therapy  - occupational therapy  -GC      Row Name 05/14/24 1152 05/14/24 1142       General Information    Patient Profile Reviewed -- yes  -GC    Prior Level of Function -- independent:;ADL's;all household mobility;community mobility  Pt. has a tub/shower combo with tub bench  -    Existing Precautions/Restrictions fall  -GC fall  -GC    Barriers to Rehab -- none identified  -GC      Row Name 05/14/24 1142          Occupational Profile    Reason for Services/Referral (Occupational Profile) Pt. is an 82 y/o WF admitted to [facility] for LTHR on 5/10/24 and the above diagnosis.  OT referral received to evaluate adl concerns and rehab to home.  -GC       Row Name 05/14/24 1142          Living Environment    People in Home spouse  -GC       Row Name 05/14/24 1142          Home Main Entrance    Number of Stairs, Main Entrance two  -GC       Row Name 05/14/24 1142          Cognition    Orientation Status (Cognition) oriented to;person;place;situation  -       Row Name 05/14/24 1152 05/14/24 1142       Safety Issues, Functional Mobility    Impairments Affecting Function (Mobility) balance;endurance/activity tolerance;pain  -GC balance;endurance/activity tolerance;pain  -GC              User Key  (r) = Recorded By, (t) = Taken By, (c) = Cosigned By      Initials Name Provider Type    GC Sharmlia Morelos OT Occupational Therapist                     Mobility/ADL's       Row Name 05/14/24 1152 05/14/24 1145       Transfers    Transfers bed-chair transfer;sit-stand transfer;stand-sit  transfer;toilet transfer  - bed-chair transfer;sit-stand transfer;stand-sit transfer;toilet transfer  -      Row Name 05/14/24 1152 05/14/24 1145       Bed-Chair Transfer    Bed-Chair [place] (Transfers) standby assist  - standby assist  -    Assistive Device (Bed-Chair Transfers) walker, front-wheeled  -GC walker, front-wheeled  -GC      Row Name 05/14/24 1152 05/14/24 1145       Sit-Stand Transfer    Sit-Stand [place] (Transfers) supervision  - supervision  -    Assistive Device (Sit-Stand Transfers) walker, front-wheeled  -GC --      Row Name 05/14/24 1152 05/14/24 1145       Stand-Sit Transfer    Stand-Sit [place] (Transfers) standby assist  - standby assist  -    Assistive Device (Stand-Sit Transfers) walker, front-wheeled  - walker, front-wheeled  -GC      Row Name 05/14/24 1152 05/14/24 1145       Toilet Transfer    [place] Level (Toilet Transfer) standby assist  - standby assist  -    Assistive Device (Toilet Transfer) raised toilet seat  - raised toilet seat  -      Row Name 05/14/24 1152 05/14/24 1145       Functional Mobility    Functional Mobility- Ind. Level supervision required;contact guard assist  - supervision required;contact guard assist  -    Functional Mobility- Comment -- Pt. is ambulating to/from BR with CG/SBA using RW  -      Row Name 05/14/24 1152 05/14/24 1145       Activities of Daily Living    BADL Assessment/Intervention bathing;upper body dressing;lower body dressing;grooming;toileting  - bathing;lower body dressing;upper body dressing;grooming;toileting  -      Row Name 05/14/24 1152 05/14/24 1145       Bathing Assessment/Intervention    [place] Level (Bathing) standby assist;contact guard assist  - standby assist;contact guard assist  -      Row Name 05/14/24 1152 05/14/24 1145       Lower Body Dressing Assessment/Training    [place] Level (Lower Body Dressing) minimum assist (75% patient effort)  - minimum  assist (75% patient effort)  -      Row Name 05/14/24 1152 05/14/24 1145       Upper Body Dressing Assessment/Training    [place] Level (Upper Body Dressing) upper body dressing skills;set up  - upper body dressing skills;set up  -      Row Name 05/14/24 1152 05/14/24 1145       Grooming Assessment/Training    [place] Level (Grooming) grooming skills;set up  - grooming skills;set up  -      Row Name 05/14/24 1152          Toileting Assessment/Training    [place] Level (Toileting) toileting skills;standby assist  -               User Key  (r) = Recorded By, (t) = Taken By, (c) = Cosigned By      Initials Name Provider Type     Sharmila Morelos, TIFFANIE Occupational Therapist                   Obj/Interventions       Row Name 05/14/24 1146          Sensory Assessment (Somatosensory)    Sensory Assessment (Somatosensory) sensation intact  -[facility] Name 05/14/24 1146          Vision Assessment/Intervention    Visual Impairment/Limitations WFL  -       Row Name 05/14/24 1146          Range of Motion Comprehensive    General Range of Motion bilateral upper extremity ROM WNL  -[facility] Name 05/14/24 1146          Strength Comprehensive (MMT)    General Manual Muscle Testing (MMT) Assessment no strength deficits identified  -[facility] Name 05/14/24 1146          Motor Skills    Motor Skills coordination;functional endurance  -     Coordination WFL  -     Functional Endurance fair activity tolerance to support adls  -               User Key  (r) = Recorded By, (t) = Taken By, (c) = Cosigned By      Initials Name Provider Type     Sharmila Morelos, OT Occupational Therapist                   Goals/Plan       Row Name 05/14/24 1148          Transfer Goal 1 (OT)    Activity/Assistive Device (Transfer Goal 1, OT) transfers, all  -     [place] Level/Cues Needed (Transfer Goal 1, OT) modified independence  -     Time Frame (Transfer Goal 1, OT) long term goal (LTG);30 days  -        Row Name 05/14/24 1148          Bathing Goal 1 (OT)    Activity/Device (Bathing Goal 1, OT) bathing skills, all  -GC     [place] Level/Cues Needed (Bathing Goal 1, OT) modified independence  -GC     Time Frame (Bathing Goal 1, OT) long term goal (LTG);30 days  -       Row Name 05/14/24 1148          Dressing Goal 1 (OT)    Activity/Device (Dressing Goal 1, OT) dressing skills, all  -GC     [place]/Cues Needed (Dressing Goal 1, OT) modified independence  -GC     Time Frame (Dressing Goal 1, OT) long term goal (LTG);30 days  -       Row Name 05/14/24 1148          Toileting Goal 1 (OT)    Activity/Device (Toileting Goal 1, OT) toileting skills, all  -GC     [place] Level/Cues Needed (Toileting Goal 1, OT) modified independence  -GC     Time Frame (Toileting Goal 1, OT) long term goal (LTG);30 days  -       Row Name 05/14/24 1148          Problem Specific Goal 1 (OT)    Problem Specific Goal 1 (OT) Patient will demonstrate good activity tolerance for ADLs.  -GC     Time Frame (Problem Specific Goal 1, OT) long term goal (LTG);30 days  -       Row Name 05/14/24 1148          Therapy Assessment/Plan (OT)    Planned Therapy Interventions (OT) activity tolerance training;adaptive equipment training;BADL retraining;functional balance retraining;neuromuscular control/coordination retraining;occupation/activity based interventions;patient/caregiver education/training;ROM/therapeutic exercise;strengthening exercise;transfer/mobility retraining  -               User Key  (r) = Recorded By, (t) = Taken By, (c) = Cosigned By      Initials Name Provider Type    GC Sharmila Morelos OT Occupational Therapist                   Clinical Impression       Row Name 05/14/24 1146          Plan of Care Review    Plan of Care Reviewed With patient  -     Progress no change  -     Outcome Evaluation OT evaluation completed with POC established.  Performance barriers include: pain, endurance, balance and general  safety.  Continued skilled OT services required to maximize independence in adls to return home to [facility].  -       Row Name 05/14/24 1146          Therapy Assessment/Plan (OT)    Rehab Potential (OT) good, to achieve stated therapy goals  -     Criteria for Skilled Therapeutic Interventions Met (OT) yes;meets criteria;skilled treatment is necessary  -     Therapy Frequency (OT) 5 times/wk  -     Predicted Duration of Therapy Intervention (OT) x30 days  -       Row Name 05/14/24 1146          Therapy Plan Review/Discharge Plan (OT)    Anticipated Discharge Disposition (OT) home with home health  -               User Key  (r) = Recorded By, (t) = Taken By, (c) = Cosigned By      Initials Name Provider Type    GC Sharmila Morelos OT Occupational Therapist                   Outcome Measures       Row Name 05/14/24 114          How much help from another is currently needed...    Putting on and taking off regular lower body clothing? 3  -GC     Bathing (including washing, rinsing, and drying) 3  -GC     Toileting (which includes using toilet bed pan or urinal) 4  -GC     Putting on and taking off regular upper body clothing 4  -GC     Taking care of personal grooming (such as brushing teeth) 4  -GC     Eating meals 4  -GC     AM-PAC 6 Clicks Score (OT) 22  -       Row Name 05/14/24 0900          How much help from another person do you currently need...    Turning from your back to your side while in flat bed without using bedrails? 4  -AA     Moving from lying on back to sitting on the side of a flat bed without bedrails? 3  -AA     Moving to and from a bed to a chair (including a wheelchair)? 3  -AA     Standing up from a chair using your arms (e.g., wheelchair, bedside chair)? 4  -AA     Climbing 3-5 steps with a railing? 3  -AA     To walk in hospital room? 3  -AA     AM-PAC 6 Clicks Score (PT) 20  -AA     Highest Level of Mobility Goal 6 --> Walk 10 steps or more  -       Row Name 05/14/24 6196           Functional Assessment    Outcome Measure Options AM-PAC 6 Clicks Daily Activity (OT)  -GC       Row Name 05/14/24 1149          Optimal Instrument    Optimal Instrument Optimal - 3  -GC     Bending/Stooping 2  -GC     Standing 1  -GC     Reaching 1  -GC               User Key  (r) = Recorded By, (t) = Taken By, (c) = Cosigned By      Initials Name Provider Type    AA Bianca Sullivan, RN Registered Nurse    Sharmila Meneses OT Occupational Therapist                  Section G  Mobility  Dressing - self performance: limited assistance (staff provide guided maneuvering of limbs or other non-weight bearing assistance)  Dressing support/assistance: One person assist  Toileting - self performance: independent  Toileting support/assistance: No setup or physical help  Personal hygiene - self performance: supervision (oversight, encourage)  Personal hygiene support/assistance: Setup help only  Bathing  Bathing - self performance: Supervision  Bathing support/assistance: Setup only     Range of Motion  Upper Extremity: No impairment  Section GG  SectionGG: Functional Ability/Goals, Adm  Self Care, Prior Functioning (FH9145A): 3. Independent  Functional Cognition, Prior Functioning (KH5130B): 3. Independent  Upper Extremity Range of Motion (PT6482N): No impairment  Self Care, Admission (Section GG)  Eating: Self-Care Admission Performance (SL5530P9): independent (06)  Oral Hygiene: Self-Care Admission Performance (AB9272S3): setup or clean-up assistance (05)  Toileting Hygiene: Self-Care Admission Performance (NE0065D5): supervision or touching assistance (04)  Shower/Bathe Self: Self-Care Admission Performance (JP3406G3): supervision or touching assistance (04)  Upper Body Dressing: Self-Care Admission Performance (LT4962B1): setup or clean-up assistance (05)  Lower Body Dressing: Self-Care Admission Performance (SQ0713P6): partial/moderate assistance (03)  Putting On/Taking Off Footwear: Self-Care Admission Performance  (OH9819T9): partial/moderate assistance (03)  Personal Hygiene: Self-Care Admission Performance (QV2738B9): supervision or touching assistance (04)  Mobility, Admission Performance (JM4458)  Toilet Transfer: Mobility Admission Performance (ZJ2732I6): supervision or touching assistance (04)  Tub/shower Transfer: Mobility Admission Performance (XG4211NV8): supervision or touching assistance (04)  Section GG: Functional Ability/Goals, DC  Eating: Self-Care Discharge Goal (SC7029G7): independent (06)  Oral Hygiene: Self-Care Discharge Goal (AL5275M8): independent (06)  Toileting Hygiene: Self-Care Discharge Goal (YY0639Z3): independent (06)  Shower/Bathe Self: Self-Care Discharge Goal (ZS5719O7): independent (06)  Upper Body Dressing: Self-Care Discharge Goal (IP9179J2): independent (06)  Lower Body Dressing: Self-Care Discharge Goal (IP3789T7): independent (06)  Putting On/Taking Off Footwear: Self-Care Discharge Goal (HH7182B4): independent (06)  Personal Hygiene: Self-Care Discharge Goal (IK7306X1): independent (06)  Mobility (GG), Discharge Goal (HS7584)  Toilet Transfer: Mobility Discharge Goal (HJ6686K5): independent (06)  Tub/shower Transfer: Mobility Discharge Goal (RC1010BI9): independent (06)          Occupational Therapy Education       Title: PT OT SLP Therapies (Done)       Topic: Occupational Therapy (Done)       Point: ADL training (Done)       Description:   Instruct learner(s) on proper safety adaptation and remediation techniques during self care or transfers.   Instruct in proper use of assistive devices.                  Learning Progress Summary             Patient Acceptance, E, VU,NR by  at 5/14/2024 6229                         Point: Home exercise program (Resolved)       Description:   Instruct learner(s) on appropriate technique for monitoring, assisting and/or progressing therapeutic exercises/activities.                  Learner Progress:  Not documented in this visit.              Point:  Precautions (Done)       Description:   Instruct learner(s) on prescribed precautions during self-care and functional transfers.                  Learning Progress Summary             Patient Acceptance, E, VU,NR by  at 5/14/2024 1151                         Point: Body mechanics (Done)       Description:   Instruct learner(s) on proper positioning and spine alignment during self-care, functional mobility activities and/or exercises.                  Learning Progress Summary             Patient Acceptance, E, VU,NR by  at 5/14/2024 1151                                         User Key       Initials Effective Dates Name Provider Type Discipline     06/16/21 -  Sharmila Morelos OT Occupational Therapist OT                  OT Recommendation and Plan  Planned Therapy Interventions (OT): activity tolerance training, adaptive equipment training, BADL retraining, functional balance retraining, neuromuscular control/coordination retraining, occupation/activity based interventions, patient/caregiver education/training, ROM/therapeutic exercise, strengthening exercise, transfer/mobility retraining  Therapy Frequency (OT): 5 times/wk  Plan of Care Review  Plan of Care Reviewed With: patient  Progress: no change  Outcome Evaluation: OT evaluation completed with POC established.  Performance barriers include: pain, endurance, balance and general safety.  Continued skilled OT services required to maximize independence in adls to return home to [facility].     Time Calculation:   Evaluation Complexity (OT)  Review Occupational Profile/Medical/Therapy History Complexity: expanded/moderate complexity  Assessment, Occupational Performance/Identification of Deficit Complexity: 3-5 performance deficits  Clinical Decision Making Complexity (OT): detailed assessment/moderate complexity  Overall Complexity of Evaluation (OT): moderate complexity     Time Calculation- OT       Row Name 05/14/24 6493             Time Calculation- OT    OT  Received On 05/14/24  -GC      OT Goal Re-Cert Due Date 06/13/24  -GC         Timed Charges    27376 - OT Therapeutic Activity Minutes 8  -GC      33026 - OT Self Care/Mgmt Minutes 31  -GC         Untimed Charges    OT Eval/Re-eval Minutes 35  -GC         SNF Occupational Therapy Minutes    Skilled Minutes- OT 39 min  -GC         Total Minutes    Timed Charges Total Minutes 39  -GC      Untimed Charges Total Minutes 35  -GC       Total Minutes 74  -GC                User Key  (r) = Recorded By, (t) = Taken By, (c) = Cosigned By      Initials Name Provider Type     Sharmila Morelos OT Occupational Therapist                  Therapy Charges for Today       Code Description Service Date Service Provider Modifiers Qty    58224233426 HC OT THERAPEUTIC ACT EA 15 MIN 5/14/2024 Sharmila Morelos OT GO 1    36854469158 HC OT SELF CARE/MGMT/TRAIN EA 15 MIN 5/14/2024 Sharmila Morelos OT GO 2    09381195187 HC OT EVAL MOD COMPLEXITY 3 5/14/2024 Sharmila Morelos OT GO 1                 Sharmila Morelos OT  5/14/2024

## 2024-05-14 NOTE — PROGRESS NOTES
"Nursing Facility Medication Regimen Review    Potentially Clinically Significant Medication Issues during this review: [x]Identified   []Not identified    Provider acknowledgement required?   [x]Yes   []No    Laura Cardenas is a 83 y.o.female admitted by Edmond Sheppard MD , on 5/13/2024  6:31 PM , for Physical debility [R53.81]    Visit Vitals  /45 (BP Location: Right arm, Patient Position: Lying)   Pulse 93   Temp 98.6 °F (37 °C) (Oral)   Resp 16   Ht 157.5 cm (62\")   Wt 78 kg (171 lb 15.3 oz)   SpO2 96%   BMI 31.45 kg/m²        Lab Results   Component Value Date    GLUCOSE 120 (H) 05/14/2024    BUN 13 05/14/2024    CREATININE 0.61 05/14/2024    EGFRIFNONA 75 10/04/2021    BCR 21.3 05/14/2024    K 3.8 05/14/2024    CO2 26.9 05/14/2024    CALCIUM 8.8 05/14/2024    ALBUMIN 2.9 (L) 05/14/2024    LABIL2 1.4 03/04/2021    AST 51 (H) 05/14/2024    ALT 15 05/14/2024    WBC 10.94 (H) 05/14/2024    HGB 7.6 (L) 05/14/2024    HCT 23.7 (L) 05/14/2024    MCV 95.2 05/14/2024     05/14/2024      Lab Results   Component Value Date    CALCIUM 8.8 05/14/2024    PHOS 2.6 05/14/2024     No results found for: \"IRON\", \"TIBC\", \"FERRITIN\"      Active Ambulatory Problems     Diagnosis Date Noted    Gastro-esophageal reflux disease without esophagitis 10/27/2015    Mixed hyperlipidemia 10/03/2021    Impaired fasting glucose 10/27/2015    Vitamin D deficiency 10/27/2015    Primary osteoarthritis involving multiple joints 10/03/2021    Age-related osteoporosis without current pathological fracture 10/03/2021    Suprapubic fullness 03/21/2023    Medicare annual wellness visit, subsequent 03/26/2023    Mixed stress and urge urinary incontinence 03/28/2023    Non-seasonal allergic rhinitis due to pollen 03/28/2023    History of DVT of lower extremity 09/26/2023    Left lumbar radiculopathy 03/26/2024    Hip fx, left, closed, initial encounter 05/09/2024    Closed fracture of left hip 05/09/2024     Resolved Ambulatory Problems "     Diagnosis Date Noted    Encounter for screening for malignant neoplasm of breast 10/03/2021    Iron deficiency anemia 10/03/2021    Primary osteoarthritis of both knees 08/31/2015    Rectal bleeding 10/03/2021    Venous thromboembolism (VTE) 10/03/2021    Positive colorectal cancer screening using Cologuard test 10/06/2021    Osteopenia of necks of both femurs 03/28/2022     Past Medical History:   Diagnosis Date    Acute embolism and thrombosis of deep vein of left lower extremity     Disorder of bone, unspecified     Diverticulosis     DJD (degenerative joint disease)     GERD without esophagitis     History of transfusion     Primary generalized (osteo)arthritis     Spinal headache         Hospital Medications (active)      Indication   Dose Frequency Start End   Fever acetaminophen (TYLENOL) tablet 325 mg 325 mg Every 4 Hours PRN 5/13/2024 --    Admin Instructions: Based on patient request - if ordered for moderate or severe pain, provider allows for administration of a medication prescribed for a lower pain scale.    Do not exceed 4 grams of acetaminophen in a 24 hr period. Max dose of 2gm for AST/ALT greater than 120 units/L.    If given for pain, use the following pain scale:   Mild Pain = Pain Score of 1-3, CPOT 1-2  Moderate Pain = Pain Score of 4-6, CPOT 3-4  Severe Pain = Pain Score of 7-10, CPOT 5-8    Route: Oral   Mild pain acetaminophen (TYLENOL) tablet 650 mg 650 mg Every 6 Hours PRN 5/13/2024 --    Admin Instructions: If given for fever, use fever parameter: fever greater than 100.4 °F  Based on patient request - if ordered for moderate or severe pain, provider allows for administration of a medication prescribed for a lower pain scale.    Do not exceed 4 grams of acetaminophen in a 24 hr period. Max dose of 2gm for AST/ALT greater than 120 units/L.    If given for pain, use the following pain scale:   Mild Pain = Pain Score of 1-3, CPOT 1-2  Moderate Pain = Pain Score of 4-6, CPOT 3-4  Severe  "Pain = Pain Score of 7-10, CPOT 5-8    Route: Oral   Arthritis aspirin tablet 325 mg 325 mg Every 12 Hours Scheduled 5/13/2024 --    Admin Instructions: Based on patient request - if ordered for moderate or severe pain, provider allows for administration of a medication prescribed for a lower pain scale.  Do not exceed 4 grams of aspirin in a 24 hr period.    If given for pain, use the following pain scale:   Mild Pain = Pain Score of 1-3, CPOT 1-2  Moderate Pain = Pain Score of 4-6, CPOT 3-4  Severe Pain = Pain Score of 7-10, CPOT 5-8    Route: Oral   Constipation bisacodyl (DULCOLAX) EC tablet 5 mg 5 mg Daily PRN 5/13/2024 --    Admin Instructions: Use if no bowel movement after 12 hours.  Swallow whole. Do not crush, split, or chew tablet.    Route: Oral    Linked Group 1: Placed in \"And\" Linked Group       Constipation bisacodyl (DULCOLAX) suppository 10 mg 10 mg Daily PRN 5/13/2024 --    Admin Instructions: Use if no bowel movement after 12 hours.  Hold for diarrhea    Route: Rectal    Linked Group 1: Placed in \"And\" Linked Group       Osteoporosis Calcium Carbonate 600 mg 600 mg 2 Times Daily With Meals 5/13/2024 --    Admin Instructions: Calcium carbonate 1000 mg = 400 mg elemental calcium.    Route: Oral   Hypoglycemia dextrose (D50W) (25 g/50 mL) IV injection 25 g 25 g Every 15 Minutes PRN 5/13/2024 --    Admin Instructions: Blood sugar less than 70; patient has IV access - Unresponsive, NPO or Unable To Safely Swallow    Route: Intravenous   Hypoglycemia dextrose (GLUTOSE) oral gel 15 g 15 g Every 15 Minutes PRN 5/13/2024 --    Admin Instructions: BS<70, Patient Alert, Is not NPO, Can safely swallow.    Route: Oral   Depression escitalopram (LEXAPRO) tablet 10 mg 10 mg Daily 5/14/2024 --    Admin Instructions: Caution: Look alike/sound alike drug alert.    Route: Oral   Iron deficiency anemia ferrous sulfate tablet 325 mg 325 mg Daily With Breakfast 5/14/2024 --    Admin Instructions: Swallow whole. Do not " crush, split, or chew. Take with food if GI upset occurs.    Route: Oral   Anxiety   gabapentin (NEURONTIN) capsule 300 mg 300 mg Every 12 Hours Scheduled 5/13/2024 --    Admin Instructions:     Route: Oral   Hypoglycemia glucagon (GLUCAGEN) injection 1 mg 1 mg Every 15 Minutes PRN 5/13/2024 --    Admin Instructions: Blood Glucose Less Than 70 - Patient Without IV Access - Unresponsive, NPO or Unable To Safely Swallow  Reconstitute powder for injection by adding 1 mL of -supplied sterile diluent or sterile water for injection to a vial containing 1 mg of the drug, to provide solutions containing 1 mg/mL. Shake vial gently to dissolve.    Route: Intramuscular   Constipation glycerin adult 1 suppository 1 suppository Once 5/13/2024 --    Route: Rectal   High blood pressure hydrALAZINE (APRESOLINE) injection 10 mg 10 mg Every 6 Hours PRN 5/13/2024 --    Admin Instructions: Hold for SBP less than 100, DBP less than 60.  Caution: Look alike/sound alike drug alert    Route: Intravenous   Pre-diabetes Insulin Lispro (humaLOG) injection 2-7 Units 2-7 Units 4 Times Daily Before Meals & Nightly 5/13/2024 --    Admin Instructions: Correction Insulin - Low Dose - Total Insulin Dose Less Than 40 units/day (Lean, Elderly or Renal Patients)    Blood Glucose 150-199 mg/dL - 2 units  Blood Glucose 200-249 mg/dL - 3 units  Blood Glucose 250-299 mg/dL - 4 units  Blood Glucose 300-349 mg/dL - 5 units  Blood Glucose 350-400 mg/dL - 6 units  Blood Glucose Greater Than 400 mg/dL - 7 units & Call Provider   Caution: Look alike/sound alike drug alert    Route: Subcutaneous   Constipation magnesium hydroxide (MILK OF MAGNESIA) suspension 10 mL 10 mL Daily PRN 5/13/2024 --    Route: Oral   Allergic rhinitis montelukast (SINGULAIR) tablet 10 mg 10 mg Nightly 5/13/2024 --    Route: Oral   Severe pain morphine injection 2 mg 2 mg Every 4 Hours PRN 5/13/2024 5/14/2024    Admin Instructions: Based on patient request - if ordered for  "moderate or severe pain, provider allows for administration of a medication prescribed for a lower pain scale.  If given for pain, use the following pain scale:  Mild Pain = Pain Score of 1-3, CPOT 1-2  Moderate Pain = Pain Score of 4-6, CPOT 3-4  Severe Pain = Pain Score of 7-10, CPOT 5-8    Route: Intravenous   Nausea, vomiting ondansetron (ZOFRAN) injection 4 mg 4 mg Every 6 Hours PRN 5/13/2024 --    Admin Instructions: If BOTH ondansetron (ZOFRAN) and promethazine (PHENERGAN) are ordered use ondansetron first and THEN promethazine IF ondansetron is ineffective.    Route: Intravenous    Linked Group 2: Placed in \"Or\" Linked Group       Nausea, vomiting ondansetron ODT (ZOFRAN-ODT) disintegrating tablet 4 mg 4 mg Every 6 Hours PRN 5/13/2024 --    Admin Instructions: If BOTH ondansetron (ZOFRAN) and promethazine (PHENERGAN) are ordered use ondansetron first and THEN promethazine IF ondansetron is ineffective.  Place on tongue and allow to dissolve.    Route: Translingual    Linked Group 2: Placed in \"Or\" Linked Group       Urinary incontenance oxybutynin XL (DITROPAN-XL) 24 hr tablet 10 mg 10 mg Daily 5/14/2024 --    Admin Instructions: Do not crush or chew the capsules or tablets. The drug may not work as designed if the capsule or tablet is crushed or chewed. Swallow whole.    Route: Oral   Severe pain oxyCODONE (ROXICODONE) immediate release tablet 10 mg 10 mg Every 4 Hours PRN 5/13/2024 5/17/2024    Admin Instructions:     If given for pain, use the following pain scale:  Mild Pain = Pain Score of 1-3, CPOT 1-2  Moderate Pain = Pain Score of 4-6, CPOT 3-4  Severe Pain = Pain Score of 7-10, CPOT 5-8    Route: Oral   Moderate pain oxyCODONE (ROXICODONE) immediate release tablet 5 mg 5 mg Every 4 Hours PRN 5/13/2024 5/17/2024    Admin Instructions:     If given for pain, use the following pain scale:  Mild Pain = Pain Score of 1-3, CPOT 1-2  Moderate Pain = Pain Score of 4-6, CPOT 3-4  Severe Pain = Pain Score of " "7-10, CPOT 5-8    Route: Oral   GERD pantoprazole (PROTONIX) EC tablet 40 mg 40 mg Every Early Morning 5/14/2024 --    Admin Instructions: Swallow whole; do not crush, split, or chew.    Route: Oral   Constipation polyethylene glycol (MIRALAX) packet 17 g 17 g Daily PRN 5/13/2024 --    Admin Instructions: Use if no bowel movement after 12 hours. Mix in 6-8 ounces of water.  Use 4-8 ounces of water, tea, or juice for each 17 gram dose.    Route: Oral    Linked Group 1: Placed in \"And\" Linked Group       Constipation polyethylene glycol (MIRALAX) packet 17 g 17 g Daily 5/14/2024 --    Admin Instructions: Use 4-8 ounces of water, tea, or juice for each 17 gram dose.    Route: Oral   Hyperlipidemia pravastatin (PRAVACHOL) tablet 40 mg 40 mg Nightly 5/13/2024 --    Admin Instructions: Avoid grapefruit juice.    Route: Oral   Nausea, vomiting promethazine (PHENERGAN) suppository 12.5 mg 12.5 mg Every 6 Hours PRN 5/13/2024 --    Admin Instructions: If BOTH ondansetron (ZOFRAN) and promethazine (PHENERGAN) are ordered use ondansetron first and THEN promethazine IF ondansetron is ineffective.    Route: Rectal    Linked Group 3: Placed in \"Or\" Linked Group       Nausea, vomiting promethazine (PHENERGAN) tablet 12.5 mg 12.5 mg Every 6 Hours PRN 5/13/2024 --    Admin Instructions: If BOTH ondansetron (ZOFRAN) and promethazine (PHENERGAN) are ordered use ondansetron first and THEN promethazine IF ondansetron is ineffective.      Route: Oral    Linked Group 3: Placed in \"Or\" Linked Group       Constipation sennosides-docusate (PERICOLACE) 8.6-50 MG per tablet 2 tablet 2 tablet 2 Times Daily PRN 5/13/2024 --    Admin Instructions: Start bowel management regimen if patient has not had a bowel movement after 12 hours.    Route: Oral    Linked Group 1: Placed in \"And\" Linked Group       Constipation sennosides-docusate (PERICOLACE) 8.6-50 MG per tablet 2 tablet 2 tablet 2 Times Daily 5/13/2024 --    Route: Oral   Line care sodium " chloride 0.9 % flush 10 mL 10 mL Every 12 Hours Scheduled 5/13/2024 --    Route: Intravenous   Line care sodium chloride 0.9 % flush 10 mL 10 mL As Needed 5/13/2024 --    Route: Intravenous   Line care sodium chloride 0.9 % flush 10 mL 10 mL Every 12 Hours Scheduled 5/13/2024 --    Route: Intravenous   Line care sodium chloride 0.9 % flush 10 mL 10 mL As Needed 5/13/2024 --    Route: Intravenous   Line care sodium chloride 0.9 % infusion 40 mL 40 mL As Needed 5/13/2024 --    Admin Instructions: Following administration of an IV intermittent medication, flush line with 40mL NS at 100mL/hr.    Route: Intravenous   Line care sodium chloride 0.9 % infusion 40 mL 40 mL As Needed 5/13/2024 --    Admin Instructions: Following administration of an IV intermittent medication, flush line with 40mL NS at 100mL/hr.    Route: Intravenous   Diagnostic tuberculin injection 5 Units 5 Units Once 5/20/2024 --    Admin Instructions: 2nd Step    Route: Intradermal               Psychotropic Medications  OXYCODONE  PROMETHAZINE  MORPHINE  GABAPENTIN  OXYBUTYNIN  ESCITALOPRAM    Potentially Clinically Significant Medication Issues/PharmD Recommendations:   Psychotropic Medication: OXYCODONE (BEERS LIST): AVOID IN > 66 YO WITH DELIRIUM, HO FALLS, FRACTURES, EXCEPT WHEN USING FOR SEVERE ACUTE PAIN; PROMETHAZINE (BEERS LIST): AVOID IN > 66 YO DUE TO INCREASED FALLS RISK, DELIRIUM, DEMENTIA; MORPHINE (BEERS LIST): AVOID IN > 66 YO WITH DELIRIUM OR HO FALLS, FRACTURES, UNLESS USED FOR SEVERE ACUTE PAIN; GABAPENTIN (BEERS LIST):  REDUCED DOSES IN > 66 YO WITH IMPAIRED RENAL FUNCTION DUE TO INCREASED CNS EFFECTS; OXYBUTYNIN (BEERS LIST): AVOID IN > 66 YO WITH DELIRIUM, DEMENTIA OR LUTS DUE TO INCREASED FALLS RISK; ESCITALOPRAM (BEERS LIST): AVOID IN > 66 YO WITH HO FALLS, FRACTURES; CAN EXACERBATE SIADH, HYPONATREMIA  Opioid Use Review: MME: 0  YESTERDAY   Medication without an appropriate indication: NA  Untreated indication: NA  Missing  Duration: NA  Excessive or Inadequate Dose: NA  Ineffective Drug Therapy: NA  Medication Adverse Reactions/Consequences: NA  Medication Monitoring: APAP: LFTs; CALCIUM CARBONATE: CA; FERROUS SULFATE: IRON PROFILE; HYDRALAZINE: BP; INSULIN: BLOOD GLUCOSE  Drug Interactions: ASA-ESCITALOPRAM: INCREASED RISK OF GI BLEED; CALCIUM CARBONATE-PANTOPRAZOLE: DECREASED ABSORPTION OF CALCIUM; ONDANSETRON-ESCITALOPRAM: QT-PROLONGATION ; MORPHINE-ESCITALOPRAM: ENHANCED SEROTONERGIC EFFECTS; ESCITALOPRAM-OXYCODOEN: ENHANCED SEROTONERGIC EFFECTS  Duplicate Therapy: NA  PTA Medication Omissions (not currently ordered): HOME MEDS HAVE BEEN ADDRESSED  Safety: NA      Additional notes: NA      In completing this Drug Regimen Review for KELTON Henderson Linda Boswell, Pharm.D., have reviewed the electronic medical chart contents, including but not limited to the following: Medication Administration Records (MAR), Prescriber's orders, Progress, nursing and consultants' notes, Laboratory and diagnostic test results, Other sources of information about documented expressions or indications of distress and/or changes in condition.

## 2024-05-14 NOTE — PLAN OF CARE
Goal Outcome Evaluation:  Plan of Care Reviewed With: patient        Progress: no change  Outcome Evaluation: Patient presents with deficits in balance, endurance, transfers, and ambulation. Patient will benefit from skilled PT services to address these mobility deficits and decrease risk of falls.      Anticipated Discharge Disposition (PT): home, home with home health

## 2024-05-14 NOTE — PROGRESS NOTES
"Chief Complaint  Follow-up of the Left Hip    Subjective      Laura Cardenas presents to [facility] for follow-up of left hip pain, osteoarthritis, and left SI joint pain.  She was recently seen in office on 5/7/2024 with complaint of localized pain to the left buttock/SI joint with radiation towards her knee.  She received an IM steroid injection and was advised return to PT.  Patient presents today in wheelchair reporting progressive symptoms.  She states as of today she is \"starting to have a little groin pain\".  Patient denies any falls, traumas, or injuries.  She has had progressive difficulties with weightbearing.    Objective   Allergies   Allergen Reactions    Apixaban Hives       Vital Signs:   Ht 157.5 cm (62\")   Wt 73.9 kg (163 lb)   BMI 29.81 kg/m²       Physical Exam    Constitutional: Awake, alert. Well nourished appearance.    Integumentary: Warm, dry, intact. No obvious rashes.    HENT: Atraumatic, normocephalic.   Respiratory: Non labored respirations .   Cardiovascular: Intact peripheral pulses.    Psychiatric: Normal mood and affect. A&O X3    Ortho Exam  Left hip: Skin is warm, dry, and intact.  Mild tenderness to palpation of posterior left hip.  Pain reported with attempts at active or passive hip ROM in all planes.  Full knee flexion and extension.  Full plantarflexion dorsiflexion ankle.  Sensation and distal neurovascular intact.    Imaging:  Narrative & Impression   X-Ray Report:  Study: X-rays ordered, taken in the office, and reviewed today.   Site: Left hip Xray  Indication: Pain  View: AP/Lateral view(s)  Findings: Collapse of the left femoral head.  Abnormal alignment of the femoral head and neck.   Prior studies available for comparison: yes               Assessment and Plan   Problem List Items Addressed This Visit    None  Visit Diagnoses       Left hip pain    -  Primary    Relevant Orders    XR Hip With or Without Pelvis 2 - 3 View Left (Completed) "    Avascular necrosis of left femur        Other fracture of left femur, initial encounter for closed fracture              Follow Up   No follow-ups on file.    Tobacco Use: Medium Risk (5/13/2024)    Patient History     Smoking Tobacco Use: Former     Smokeless Tobacco Use: Never     Passive Exposure: Not on file     Patient is a former smoker.  Encouraged continued tobacco cessation.  Did not discuss options for smoking cessation.    Patient Instructions   X-rays were taken and reviewed revealing significant abnormality concern for collapsed femoral head.  Prior x-rays were reviewed and compared, showing significant interval change.  Did discuss in office with Dr. Bower.     Discussed with Dr. Cruz, on-call.  Patient sent to ER for admission via POV.  Discussed at length with the patient and her daughter.  All questions answered.     Patient was given instructions and counseling regarding her condition or for health maintenance advice. Please see specific information pulled into the AVS if appropriate.

## 2024-05-14 NOTE — PATIENT INSTRUCTIONS
X-rays were taken and reviewed revealing significant abnormality concern for collapsed femoral head.  Prior x-rays were reviewed and compared, showing significant interval change.  Did discuss in office with Dr. Bower.     Discussed with Dr. Cruz, on-call.  Patient sent to ER for admission via POV.  Discussed at length with the patient and her daughter.  All questions answered.

## 2024-05-14 NOTE — PLAN OF CARE
Goal Outcome Evaluation:  Plan of Care Reviewed With: patient        Progress: no change  Outcome Evaluation: OT evaluation completed with POC established.  Performance barriers include: pain, endurance, balance and general safety.  Continued skilled OT services required to maximize independence in adls to return home to [facility].      Anticipated Discharge Disposition (OT): home with home health

## 2024-05-14 NOTE — PLAN OF CARE
Goal Outcome Evaluation:  Plan of Care Reviewed With: patient           Outcome Evaluation: Pt is a new admit to SNF. She is AO x 4 and VSS. She is a 1 assist to the bathroom with a walker. Blood sugar was 122. No c/o pain or discomfort. Left hip dressing dry and intact and will continue to monitor. Will continue with her plan of care. Call light and personal items within reach.

## 2024-05-14 NOTE — THERAPY EVALUATION
SNF - Physical Therapy Initial Evaluation and Treatment Note  MARCELINO Canela     Patient Name: Laura Cardenas  : 1940  MRN: [id number]  Today's Date: 2024      Visit Dx:    ICD-10-CM ICD-9-CM   1. Decreased activities of daily living (ADL)  Z78.9 V49.89   2. Difficulty walking  R26.2 719.7     Patient Active Problem List   Diagnosis    Gastro-esophageal reflux disease without esophagitis    Mixed hyperlipidemia    Impaired fasting glucose    Vitamin D deficiency    Primary osteoarthritis involving multiple joints    Age-related osteoporosis without current pathological fracture    Suprapubic fullness    Medicare annual wellness visit, subsequent    Mixed stress and urge urinary incontinence    Non-seasonal allergic rhinitis due to pollen    History of DVT of lower extremity    Left lumbar radiculopathy    Hip fx, left, closed, initial encounter    Closed fracture of left hip    Physical debility     Past Medical History:   Diagnosis Date    Acute embolism and thrombosis of deep vein of left lower extremity     Age-related osteoporosis without current pathological fracture     Disorder of bone, unspecified     Diverticulosis     DJD (degenerative joint disease)     GERD without esophagitis     History of transfusion     59 years ago for childbirth    Mixed hyperlipidemia     Primary generalized (osteo)arthritis     Spinal headache      Past Surgical History:   Procedure Laterality Date    BACK SURGERY      BACK SURGERY      S/P Lumbar    BREAST BIOPSY      Breast Bx (81 & 98)    CATARACT EXTRACTION      2015 L Cataract 2015 R Cataract    COLONOSCOPY      DR. AWAD    COLONOSCOPY N/A 2021    Procedure: COLONOSCOPY, WITH HOT SNARE POLYPECTOMY,;  Surgeon: Tony Thomas MD;  Location: [facility] ENDOSCOPY;  Service: Gastroenterology;  Laterality: N/A;  DIVERTICULOSIS, COLON POLYP    REPLACEMENT TOTAL KNEE Bilateral     04/10/2019 right knee replacement 2020 knee replacement  left    TONSILLECTOMY      10 YEARS OLD    TOTAL HIP ARTHROPLASTY Left 5/10/2024    Procedure: TOTAL HIP ARTHROPLASTY;  Surgeon: Ivan Cruz MD;  Location: [facility] MAIN OR;  Service: Orthopedics;  Laterality: Left;       PT Assessment (Last 12 Hours)       PT Evaluation and Treatment       Row Name 05/14/24 1050          Physical Therapy Time and Intention    Subjective Information no complaints  -AV     Document Type evaluation;therapy note (daily note)  -AV     Mode of Treatment individual therapy;physical therapy  -AV     Symptoms Noted During/After Treatment fatigue  -AV       Row Name 05/14/24 1050          General Information    Patient Profile Reviewed yes  -AV     Prior Level of Function independent:;all household mobility;gait;transfer;ADL's  Typically ambulated without an assistive device but had recently begun using RW due L hip pain, prior to justin. No home O2. No recent falls.  -AV     Equipment Currently Used at Home walker, rolling  -AV     Existing Precautions/Restrictions fall;weight bearing  WBAT LLE  -AV       Row Name 05/14/24 1050          Living Environment    Current Living Arrangements home  -AV     Home Accessibility stairs to enter home  -AV     People in Home spouse  -AV       Row Name 05/14/24 1050          Home Main Entrance    Number of Stairs, Main Entrance two  -AV     Stair Railings, Main Entrance --  Reports having assist from family to ascend/descend stairs  -AV       Row Name 05/14/24 1050          Pain    Pretreatment Pain Rating 0/10 - no pain  -AV     Posttreatment Pain Rating 0/10 - no pain  -AV       Row Name 05/14/24 1050          Cognition    Orientation Status (Cognition) oriented x 4  -AV       Row Name 05/14/24 1050          Range of Motion (ROM)    Range of Motion bilateral lower extremities;ROM is WFL  -AV       Row Name 05/14/24 1050          Strength (Manual Muscle Testing)    Strength (Manual Muscle Testing) right lower extremity strength;left lower extremity  strength  -AV     Left Lower Extremity Strength hip;knee;ankle  -AV     Hip, Left (Strength) 2+/5  -AV     Knee, Left (Strength) 4/5  -AV     Ankle, Left (Strength) 4/5  -AV     Right Lower Extremity Strength hip;knee;ankle  -AV     Hip, Right (Strength) 4-/5  -AV     Knee, Right (Strength) 4/5  -AV     Ankle, Right (Strength) 4/5  -AV       Row Name 05/14/24 1050          Mobility    Extremity Weight-bearing Status left lower extremity  -AV     Left Lower Extremity (Weight-bearing Status) weight-bearing as tolerated (WBAT)  -AV       Row Name 05/14/24 1050          Bed Mobility    Comment, (Bed Mobility) Patient seated upright in recliner upon therapist entry.  -AV       Row Name 05/14/24 1050          Transfers    Transfers sit-stand transfer;stand-sit transfer  -AV       Row Name 05/14/24 1050          Sit-Stand Transfer    Sit-Stand [place] (Transfers) standby assist  -AV     Assistive Device (Sit-Stand Transfers) walker, front-wheeled  -AV       Row Name 05/14/24 1050          Stand-Sit Transfer    Stand-Sit [place] (Transfers) standby assist  -AV     Assistive Device (Stand-Sit Transfers) walker, front-wheeled  -AV       Row Name 05/14/24 1050          Gait/Stairs (Locomotion)    Gait/Stairs Locomotion gait/ambulation independence;gait/ambulation assistive device;distance ambulated  -AV     [place] Level (Gait) contact guard;standby assist  -AV     Assistive Device (Gait) walker, front-wheeled  -AV     Distance in Feet (Gait) --  100, 150  -AV     Pattern (Gait) step-through  -AV     Deviations/Abnormal Patterns (Gait) gait speed decreased;stride length decreased  -AV     [place] Level (Stairs) contact guard  -AV     Assistive Device (Stairs) --  bilateral handrails  -AV     Handrail Location (Stairs) both sides  -AV     Number of Steps (Stairs) 2 x2  -AV     Ascending Technique (Stairs) step-over-step  -AV     Descending Technique (Stairs) step-over-step  -AV     Stairs, Impairments  impaired balance  -       Row Name 05/14/24 1050          Safety Issues, Functional Mobility    Impairments Affecting Function (Mobility) balance;endurance/activity tolerance;strength  -       Row Name 05/14/24 1050          Balance    Balance Assessment standing dynamic balance  -AV     Dynamic Standing Balance contact guard;standby assist  -AV     Position/Device Used, Standing Balance supported;walker, front-wheeled  -AV       Row Name 05/14/24 1050          Motor Skills    Therapeutic Exercise hip;knee;ankle;aerobic  -       Row Name 05/14/24 1050          Hip (Therapeutic Exercise)    Hip (Therapeutic Exercise) AROM (active range of motion);AAROM (active assistive range of motion)  -     Hip AROM (Therapeutic Exercise) right;flexion;extension;left;aBduction;aDduction;20 repititions;sitting  -     Hip AAROM (Therapeutic Exercise) left;flexion;extension;sitting;2 sets;10 repetitions  -       Row Name 05/14/24 1050          Knee (Therapeutic Exercise)    Knee (Therapeutic Exercise) AROM (active range of motion)  -     Knee AROM (Therapeutic Exercise) bilateral;LAQ (long arc quad);heel slides;sitting;2 sets;10 repetitions  -       Row Name 05/14/24 1050          Ankle (Therapeutic Exercise)    Ankle (Therapeutic Exercise) AROM (active range of motion)  -     Ankle AROM (Therapeutic Exercise) bilateral;dorsiflexion;plantarflexion;sitting;20 repititions  -       Row Name 05/14/24 1050          Aerobic Exercise    Comment, Aerobic Exercise (Therapeutic Exercise) Nustep, load 2 x10 minutes  -       Row Name             Wound 05/10/24 1832 Left anterior greater trochanter Incision    Wound - Properties Group Placement Date: 05/10/24  -HK Placement Time: 1832 -HK Side: Left  -HK Orientation: anterior  -HK Location: greater trochanter  -HK Primary Wound Type: Incision  -HK    Retired Wound - Properties Group Placement Date: 05/10/24  -HK Placement Time: 1832  -HK Side: Left  -HK Orientation:  anterior  -HK Location: greater trochanter  -HK Primary Wound Type: Incision  -HK    Retired Wound - Properties Group Date first assessed: 05/10/24  -HK Time first assessed: 1832  -HK Side: Left  -HK Location: greater trochanter  -HK Primary Wound Type: Incision  -HK      Row Name 05/14/24 1050          Plan of Care Review    Plan of Care Reviewed With patient  -AV     Progress no change  -AV     Outcome Evaluation Patient presents with deficits in balance, endurance, transfers, and ambulation. Patient will benefit from skilled PT services to address these mobility deficits and decrease risk of falls.  -AV       Row Name 05/14/24 1050          Vital Signs    O2 Delivery Pre Treatment room air  -AV     O2 Delivery Intra Treatment room air  -AV     O2 Delivery Post Treatment room air  -AV       Row Name 05/14/24 1050          Positioning and Restraints    Pre-Treatment Position in bed  -AV     Post Treatment Position chair  -AV     In Chair sitting;call light within reach;encouraged to call for assist  -AV       Row Name 05/14/24 1050          Therapy Assessment/Plan (PT)    Rehab Potential (PT) good, to achieve stated therapy goals  -AV     Criteria for Skilled Interventions Met (PT) yes;skilled treatment is necessary  -AV     Therapy Frequency (PT) 2 times/day  -AV     Predicted Duration of Therapy Intervention (PT) 30 days  -AV     Problem List (PT) problems related to;balance;mobility;strength;pain  -AV     Activity Limitations Related to Problem List (PT) unable to transfer safely;unable to ambulate safely  -AV       Row Name 05/14/24 1050          PT Evaluation Complexity    History, PT Evaluation Complexity no personal factors and/or comorbidities  -AV     Examination of Body Systems (PT Eval Complexity) total of 4 or more elements  -AV     Clinical Presentation (PT Evaluation Complexity) stable  -AV     Clinical Decision Making (PT Evaluation Complexity) low complexity  -AV     Overall Complexity (PT  Evaluation Complexity) low complexity  -AV       Row Name 05/14/24 1050          Therapy Plan Review/Discharge Plan (PT)    Therapy Plan Review (PT) evaluation/treatment results reviewed;patient  -AV       Row Name 05/14/24 1050          Physical Therapy Goals    Bed Mobility Goal Selection (PT) bed mobility, PT goal 1  -AV     Transfer Goal Selection (PT) transfer, PT goal 1  -AV     Gait Training Goal Selection (PT) gait training, PT goal 1  -AV     Strength Goal Selection (PT) strength, PT goal 1  -AV     Stairs Goal Selection (PT) stairs, PT goal 1  -AV       Row Name 05/14/24 1050          Bed Mobility Goal 1 (PT)    Activity/Assistive Device (Bed Mobility Goal 1, PT) sit to supine/supine to sit  -AV     [place] Level/Cues Needed (Bed Mobility Goal 1, PT) independent  -AV     Time Frame (Bed Mobility Goal 1, PT) 30 days  -AV       Row Name 05/14/24 1050          Transfer Goal 1 (PT)    Activity/Assistive Device (Transfer Goal 1, PT) sit-to-stand/stand-to-sit;bed-to-chair/chair-to-bed;walker, rolling  -AV     [place] Level/Cues Needed (Transfer Goal 1, PT) modified independence  -AV     Time Frame (Transfer Goal 1, PT) 30 days  -AV       Row Name 05/14/24 1050          Gait Training Goal 1 (PT)    Activity/Assistive Device (Gait Training Goal 1, PT) gait (walking locomotion);assistive device use;walker, rolling  -AV     [place] Level (Gait Training Goal 1, PT) modified independence  -AV     Distance (Gait Training Goal 1, PT) 300  -AV     Time Frame (Gait Training Goal 1, PT) 30 days  -AV       Row Name 05/14/24 1050          Strength Goal 1 (PT)    Strength Goal 1 (PT) Patient will demonstrate improvement in gross left hip strength to 4/5.  -AV     Time Frame (Strength Goal 1, PT) 30 days  -AV       Row Name 05/14/24 1050          Stairs Goal 1 (PT)    Activity/Assistive Device (Stairs Goal 1, PT) ascending stairs;descending stairs;step-over step  -AV     [place] Level/Cues Needed (Stairs  Goal 1, PT) contact guard required  -AV     Number of Stairs (Stairs Goal 1, PT) 2  -AV     Time Frame (Stairs Goal 1, PT) 30 days  -AV               User Key  (r) = Recorded By, (t) = Taken By, (c) = Cosigned By      Initials Name Provider Type    Starr Frost, RN Registered Nurse    Shimon Hurst, PT Physical Therapist                  Section G  Mobility  Bed mobility - self performance: limited assistance (staff provide guided maneuvering of limbs or other non-weight bearing assistance)  Bed mobility support/assistance: One person assist  Transfer - self performance: limited assistance (staff provide guided maneuvering of limbs or other non-weight bearing assistance)  Transfer support/assistance: One person assist  Walking in room - self performance: limited assistance (staff provide guided maneuvering of limbs or other non-weight bearing assistance)  Walking in room support/assistance: One person assist  Walking in corridors/hallway - self performance: limited assistance (staff provide guided maneuvering of limbs or other non-weight bearing assistance)  Walking in corridors/hallway support/assistance: One person assist  Locomotion on unit - self performance: limited assistance (staff provide guided maneuvering of limbs or other non-weight bearing assistance)  Locomotion on unit support/assistance: One person assist  Locomotion off unit - self performance: activity did not occur  Locomotion off unit support/assistance: Activity did not occur     Balance  Balance during transitions & walking: Not steady but able to stabilize without assist  Moving from seated to standing position: Not steady but able to stabilize without assist  Walking: Not steady but able to stabilize without assist  Turning around while walking: Not steady but able to stabilize without assist  Surface-to-surface transfer: Activity did not occur  Mobility devices: Walker  Range of Motion  Lower Extremity: No impairment  Section  GG  SectionGG: Functional Ability/Goals, Adm  Indoor Mobility - Ambulation, Prior Function (AH4205Z): 3. Independent  Stairs, Prior Function (JR8912W): 2. Needed Some Help  Prior Device Use (ZQ3164): walker (D)  Lower Extremity Range of Motion (WE3180T): No impairment     Mobility, Admission Performance (FE9944)  Roll Left & Right: Mobility Admission Performance (AK0941T4): supervision or touching assistance (04)  Sit to Lying: Mobility Admission Performance (GD4557W9): supervision or touching assistance (04)  Lying to Sitting, Side of Bed: Mobility Admission Performance (KE8815B4): supervision or touching assistance (04)  Sit to Stand: Mobility Admission Performance (KY8354R7): supervision or touching assistance (04)  Chair/Bed-Chair Transfer: Mobility Admission Performance (CO5778O5): supervision or touching assistance (04)  Car Transfer: Mobility Admission Performance (XD8570X1): not attempted due to environmental limitations (10)  Walk 10 Feet: Mobility Admission Performance (TX7817X2): supervision or touching assistance (04)  Walk 50 Feet With Two Turns: Mobility Admission Performance (YZ4450I7): supervision or touching assistance (04)  Walk 150 Feet: Mobility Admission Performance (QL4803H3): supervision or touching assistance (04)  Walk 10 Ft, Uneven Surfaces: Mobility Admission Performance (LU0526J2): not attempted, medical condition/safety concern (88)  1 Step/Curb: Mobility Admission Performance (IV5198S1): supervision or touching assistance (04)  4 Steps: Mobility Admission Performance (US6707J4): not applicable (09)  12 Steps: Mobility Admission Performance (ET0945O0): not applicable (09)  Picking up object: Mobility Admission Performance (PG4079J5): not attempted, medical condition/safety concern (88)  Wheel 50 Ft Two Turns: Mobility Admission Performance (AQ4722H5): not applicable (09)  Wheel 150 Feet: Mobility Admission Performance (UK0853T9): not applicable (09)     Mobility (GG), Discharge Goal  (XJ3453)  Roll Left & Right: Mobility Discharge Goal (LH5612M5): independent (06)  Sit to Lying: Mobility Discharge Goal (LZ3047N3): independent (06)  Lying to Sitting, Side of Bed: Mobility Discharge Goal (DA2504I1): independent (06)  Sit to Stand: Mobility Discharge Goal (FL7657F2): independent (06)  Chair/Bed-Chair Transfer: Mobility Discharge Goal (JF0419Y7): independent (06)  Car Transfer: Mobility Discharge Goal (DZ9643P1): not attempted due to environmental limitations (10)  Walk 10 Feet: Mobility Discharge Goal (CX3994P3): independent (06)  Walk 50 Feet With Two Turns: Mobility Discharge Goal (BA7317E4): independent (06)  Walk 150 Feet: Mobility Discharge Goal (UA8802D1): independent (06)  Walk 10 Ft, Uneven Surfaces: Mobility Discharge Goal (IC8006A5): independent (06)  1 Step/Curb: Mobility Discharge Goal (QV4120K2): supervision or touching assistance (04)  4 Steps: Mobility Discharge Goal (IV4202L4): not applicable (09)  12 Steps: Mobility Discharge Goal (ES5125D9): not applicable (09)  Picking up object: Mobility Discharge Goal (KN0149I2): supervision or touching assistance (04)  Wheel 50 Ft Two Turns: Mobility Discharge Goal (FB5093X8): not applicable (09)  Wheel 150 Feet: Mobility Discharge Goal (RO2002T4): not applicable (09)        Physical Therapy Education       Title: PT OT SLP Therapies (In Progress)       Topic: Physical Therapy (Done)       Point: Mobility training (Done)       Learning Progress Summary             Patient Acceptance, E,TB, VU by AV at 2024 133                         Point: Home exercise program (Done)       Learning Progress Summary             Patient Acceptance, E,TB, VU by AV at 2024 1335                         Point: Body mechanics (Done)       Learning Progress Summary             Patient Acceptance, E,TB, VU by AV at 2024 133                         Point: Precautions (Done)       Learning Progress Summary             Patient Acceptance, E,TB, VU by AV  at 5/14/2024 1335                                         User Key       Initials Effective Dates Name Provider Type Discipline    AV 06/11/21 -  Shimon Navarro, GERALDO Physical Therapist PT                  PT Recommendation and Plan  Anticipated Discharge Disposition (PT): home, home with home health  Planned Therapy Interventions (PT): balance training, bed mobility training, gait training, home exercise program, neuromuscular re-education, stair training, strengthening, transfer training  Therapy Frequency (PT): 2 times/day  Plan of Care Reviewed With: patient  Progress: no change  Outcome Evaluation: Patient presents with deficits in balance, endurance, transfers, and ambulation. Patient will benefit from skilled PT services to address these mobility deficits and decrease risk of falls.   Outcome Measures       Row Name 05/14/24 1050             How much help from another person do you currently need...    Turning from your back to your side while in flat bed without using bedrails? 4  -AV      Moving from lying on back to sitting on the side of a flat bed without bedrails? 3  -AV      Moving to and from a bed to a chair (including a wheelchair)? 3  -AV      Standing up from a chair using your arms (e.g., wheelchair, bedside chair)? 3  -AV      Climbing 3-5 steps with a railing? 3  -AV      To walk in hospital room? 3  -AV      AM-PAC 6 Clicks Score (PT) 19  -AV      Highest Level of Mobility Goal 6 --> Walk 10 steps or more  -AV         Functional Assessment    Outcome Measure Options AM-PAC 6 Clicks Basic Mobility (PT)  -AV                User Key  (r) = Recorded By, (t) = Taken By, (c) = Cosigned By      Initials Name Provider Type    AV Shimon Navarro, GERALDO Physical Therapist                     Time Calculation:    PT Charges       Row Name 05/14/24 8318             Time Calculation    PT Received On 05/14/24  -AV      PT Goal Re-Cert Due Date 06/12/24  -AV         Timed Charges    25583 - PT Therapeutic  Exercise Minutes 10  -AV      28019 - Gait Training Minutes  10  -AV      22078 - PT Therapeutic Activity Minutes 10  -AV         Untimed Charges    PT Eval/Re-eval Minutes 18  -AV         SNF Physical Therapy Minutes    Skilled Minutes- PT 30 min  -AV         Total Minutes    Timed Charges Total Minutes 30  -AV      Untimed Charges Total Minutes 18  -AV       Total Minutes 48  -AV                User Key  (r) = Recorded By, (t) = Taken By, (c) = Cosigned By      Initials Name Provider Type    Shimon Hurst, PT Physical Therapist                      PT G-Codes  Outcome Measure Options: AM-PAC 6 Clicks Daily Activity (OT)  AM-PAC 6 Clicks Score (PT): 19  AM-PAC 6 Clicks Score (OT): 22    Shimon Navarro, PT  5/14/2024

## 2024-05-15 LAB
GLUCOSE BLDC GLUCOMTR-MCNC: 111 MG/DL (ref 70–99)
GLUCOSE BLDC GLUCOMTR-MCNC: 132 MG/DL (ref 70–99)
INDURATION: 0 MM (ref 0–10)
Lab: NORMAL
Lab: NORMAL
TB SKIN TEST: NEGATIVE

## 2024-05-15 PROCEDURE — 94799 UNLISTED PULMONARY SVC/PX: CPT

## 2024-05-15 PROCEDURE — 82948 REAGENT STRIP/BLOOD GLUCOSE: CPT | Performed by: FAMILY MEDICINE

## 2024-05-15 PROCEDURE — 97530 THERAPEUTIC ACTIVITIES: CPT

## 2024-05-15 PROCEDURE — 97110 THERAPEUTIC EXERCISES: CPT

## 2024-05-15 PROCEDURE — 97116 GAIT TRAINING THERAPY: CPT

## 2024-05-15 PROCEDURE — 97535 SELF CARE MNGMENT TRAINING: CPT

## 2024-05-15 PROCEDURE — 99309 SBSQ NF CARE MODERATE MDM 30: CPT | Performed by: PHYSICIAN ASSISTANT

## 2024-05-15 RX ADMIN — GABAPENTIN 300 MG: 300 CAPSULE ORAL at 20:33

## 2024-05-15 RX ADMIN — PANTOPRAZOLE SODIUM 40 MG: 40 TABLET, DELAYED RELEASE ORAL at 06:16

## 2024-05-15 RX ADMIN — ESCITALOPRAM OXALATE 10 MG: 10 TABLET ORAL at 08:35

## 2024-05-15 RX ADMIN — BENZOCAINE AND MENTHOL 1 EACH: 15; 3.6 LOZENGE ORAL at 20:46

## 2024-05-15 RX ADMIN — SENNOSIDES AND DOCUSATE SODIUM 2 TABLET: 50; 8.6 TABLET ORAL at 08:35

## 2024-05-15 RX ADMIN — ACETAMINOPHEN 650 MG: 325 TABLET ORAL at 20:46

## 2024-05-15 RX ADMIN — ASPIRIN 325 MG: 325 TABLET ORAL at 20:33

## 2024-05-15 RX ADMIN — ASPIRIN 325 MG: 325 TABLET ORAL at 08:35

## 2024-05-15 RX ADMIN — Medication 600 MG: at 18:01

## 2024-05-15 RX ADMIN — Medication 600 MG: at 08:11

## 2024-05-15 RX ADMIN — OXYBUTYNIN CHLORIDE 10 MG: 5 TABLET, EXTENDED RELEASE ORAL at 08:35

## 2024-05-15 RX ADMIN — GABAPENTIN 300 MG: 300 CAPSULE ORAL at 08:35

## 2024-05-15 RX ADMIN — POLYETHYLENE GLYCOL 3350 17 G: 17 POWDER, FOR SOLUTION ORAL at 08:35

## 2024-05-15 RX ADMIN — FERROUS SULFATE TAB 325 MG (65 MG ELEMENTAL FE) 325 MG: 325 (65 FE) TAB at 08:11

## 2024-05-15 RX ADMIN — PRAVASTATIN SODIUM 40 MG: 20 TABLET ORAL at 20:33

## 2024-05-15 RX ADMIN — MONTELUKAST 10 MG: 10 TABLET, FILM COATED ORAL at 20:33

## 2024-05-15 NOTE — PLAN OF CARE
Goal Outcome Evaluation:   Alert and oriented and pleasant with staff. X1 assist for transfers and ambulation.  No c/o pain requiring PRN pain medication noted this shift. Shows improved mobility and endurance. Sitting up in bed, call light in reach.

## 2024-05-15 NOTE — THERAPY TREATMENT NOTE
SNF - Physical Therapy Treatment Note   [place]     Patient Name: Laura Cardenas  : 1940  MRN: [id number]  Today's Date: 5/15/2024      Visit Dx:    ICD-10-CM ICD-9-CM   1. Decreased activities of daily living (ADL)  Z78.9 V49.89   2. Difficulty walking  R26.2 719.7     Patient Active Problem List   Diagnosis    Gastro-esophageal reflux disease without esophagitis    Mixed hyperlipidemia    Impaired fasting glucose    Vitamin D deficiency    Primary osteoarthritis involving multiple joints    Age-related osteoporosis without current pathological fracture    Suprapubic fullness    Medicare annual wellness visit, subsequent    Mixed stress and urge urinary incontinence    Non-seasonal allergic rhinitis due to pollen    History of DVT of lower extremity    Left lumbar radiculopathy    Hip fx, left, closed, initial encounter    Closed fracture of left hip    Physical debility     Past Medical History:   Diagnosis Date    Acute embolism and thrombosis of deep vein of left lower extremity     Age-related osteoporosis without current pathological fracture     Disorder of bone, unspecified     Diverticulosis     DJD (degenerative joint disease)     GERD without esophagitis     History of transfusion     59 years ago for childbirth    Mixed hyperlipidemia     Primary generalized (osteo)arthritis     Spinal headache      Past Surgical History:   Procedure Laterality Date    BACK SURGERY      BACK SURGERY      S/P Lumbar    BREAST BIOPSY      Breast Bx (81 & 98)    CATARACT EXTRACTION      2015 L Cataract 2015 R Cataract    COLONOSCOPY      DR. AWAD    COLONOSCOPY N/A 2021    Procedure: COLONOSCOPY, WITH HOT SNARE POLYPECTOMY,;  Surgeon: Tony Thomas MD;  Location: [facility] ENDOSCOPY;  Service: Gastroenterology;  Laterality: N/A;  DIVERTICULOSIS, COLON POLYP    REPLACEMENT TOTAL KNEE Bilateral     04/10/2019 right knee replacement 2020 knee replacement left    TONSILLECTOMY       10 YEARS OLD    TOTAL HIP ARTHROPLASTY Left 5/10/2024    Procedure: TOTAL HIP ARTHROPLASTY;  Surgeon: Ivan Cruz MD;  Location: [facility] MAIN OR;  Service: Orthopedics;  Laterality: Left;       PT Assessment (Last 12 Hours)       PT Evaluation and Treatment       Row Name 05/15/24 0908          Physical Therapy Time and Intention    Subjective Information no complaints  -VK     Document Type therapy note (daily note)  -VK     Mode of Treatment individual therapy;physical therapy  -VK     Patient Effort good  -VK       Row Name 05/15/24 0908          General Information    Patient Profile Reviewed yes  -VK     Existing Precautions/Restrictions fall  -VK       Row Name 05/15/24 0908          Cognition    Affect/Mental Status (Cognition) WNL  -VK     Orientation Status (Cognition) oriented to;person;place;situation  -VK     Personal Safety Interventions nonskid shoes/slippers when out of bed;gait belt;fall prevention program maintained  -VK       Row Name 05/15/24 0908          Mobility    Extremity Weight-bearing Status left lower extremity  -VK     Left Lower Extremity (Weight-bearing Status) weight-bearing as tolerated (WBAT)  -VK       Row Name 05/15/24 0908          Bed Mobility    Bed Mobility sit-supine;supine-sit  -VK     Supine-Sit Curry (Bed Mobility) standby assist  -VK     Sit-Supine Curry (Bed Mobility) contact guard;verbal cues  -VK     Bed Mobility, Safety Issues decreased use of legs for bridging/pushing  -VK     Assistive Device (Bed Mobility) bed rails;head of bed elevated  -VK       Row Name 05/15/24 0908          Transfers    Transfers sit-stand transfer;stand-sit transfer  -VK       [facility] Name 05/15/24 0908          Sit-Stand Transfer    Sit-Stand Curry (Transfers) standby assist;verbal cues  -VK     Assistive Device (Sit-Stand Transfers) walker, front-wheeled  -VK       Row Name 05/15/24 0908          Stand-Sit Transfer    Stand-Sit Curry (Transfers) contact guard;verbal  cues  -VK     Assistive Device (Stand-Sit Transfers) walker, front-wheeled  -VK       Row Name 05/15/24 0908          Gait/Stairs (Locomotion)    [place] Level (Gait) contact guard;standby assist  -VK     Assistive Device (Gait) walker, front-wheeled  -VK     Patient was able to Ambulate yes  -VK     Distance in Feet (Gait) --  225ft, 100ft.  -VK     Pattern (Gait) step-through  -VK     Deviations/Abnormal Patterns (Gait) gait speed decreased;stride length decreased  -VK     Left Sided Gait Deviations weight shift ability decreased;heel strike decreased  -VK       Row Name 05/15/24 0908          Safety Issues, Functional Mobility    Impairments Affecting Function (Mobility) balance;endurance/activity tolerance;pain  -       Row Name 05/15/24 0908          Balance    Dynamic Standing Balance standby assist;contact guard;verbal cues  -VK     Position/Device Used, Standing Balance walker, front-wheeled  -VK       Row Name 05/15/24 0908          Motor Skills    Functional Endurance NuStep 15 minutes, load 3  -       Row Name 05/15/24 0908          Hip (Therapeutic Exercise)    Hip AROM (Therapeutic Exercise) bilateral;flexion;20 repititions  -     Hip AAROM (Therapeutic Exercise) left;flexion;10 repetitions;2 sets  -     Hip Isometrics (Therapeutic Exercise) bilateral;aBduction;aDduction;10 repetitions;2 sets;gluteal sets  -       Row Name 05/15/24 0908          Knee (Therapeutic Exercise)    Knee Strengthening (Therapeutic Exercise) bilateral;LAQ (long arc quad);hamstring curls;green;resistance band;10 repetitions;20 repititions  -       Row Name 05/15/24 0908          Ankle (Therapeutic Exercise)    Ankle AROM (Therapeutic Exercise) bilateral;dorsiflexion;20 repititions  -       Row Name             Wound 05/10/24 1832 Left anterior greater trochanter Incision    Wound - Properties Group Placement Date: 05/10/24  -HK Placement Time: 1832  -HK Side: Left  -HK Orientation: anterior  -HK Location:  greater trochanter  -HK Primary Wound Type: Incision  -HK    Retired Wound - Properties Group Placement Date: 05/10/24  -HK Placement Time: 1832 -HK Side: Left  -HK Orientation: anterior  -HK Location: greater trochanter  -HK Primary Wound Type: Incision  -HK    Retired Wound - Properties Group Date first assessed: 05/10/24  -HK Time first assessed: 1832 -HK Side: Left  -HK Location: greater trochanter  -HK Primary Wound Type: Incision  -HK      Row Name 05/15/24 0908          Positioning and Restraints    Pre-Treatment Position in bed  -VK     Post Treatment Position bed  -VK     In Bed fowlers;call light within reach;encouraged to call for assist;exit alarm on  -VK       Row Name 05/15/24 0908          Progress Summary (PT)    Progress Toward Functional Goals (PT) progress toward functional goals is good  -VK               User Key  (r) = Recorded By, (t) = Taken By, (c) = Cosigned By      Initials Name Provider Type    Starr Frost, RN Registered Nurse    Kat Fernandez PTA Physical Therapist Assistant                  Section G              Section GG                       Physical Therapy Education       Title: PT OT SLP Therapies (In Progress)       Topic: Physical Therapy (Done)       Point: Mobility training (Done)       Learning Progress Summary             Patient Acceptance, E,TB, VU by AV at 5/14/2024 1335                         Point: Home exercise program (Done)       Learning Progress Summary             Patient Acceptance, E,TB, VU by AV at 5/14/2024 1335                         Point: Body mechanics (Done)       Learning Progress Summary             Patient Acceptance, E,TB, VU by AV at 5/14/2024 1335                         Point: Precautions (Done)       Learning Progress Summary             Patient Acceptance, E,TB, VU by AV at 5/14/2024 1335                                         User Key       Initials Effective Dates Name Provider Type Discipline    AV 06/11/21 -  Shimon Navarro  PT Physical Therapist PT                  PT Recommendation and Plan     Progress Summary (PT)  Progress Toward Functional Goals (PT): progress toward functional goals is good   Outcome Measures       Row Name 05/15/24 1200 05/14/24 1050          How much help from another person do you currently need...    Turning from your back to your side while in flat bed without using bedrails? 4  -VK 4  -AV     Moving from lying on back to sitting on the side of a flat bed without bedrails? 3  -VK 3  -AV     Moving to and from a bed to a chair (including a wheelchair)? 3  -VK 3  -AV     Standing up from a chair using your arms (e.g., wheelchair, bedside chair)? 3  -VK 3  -AV     Climbing 3-5 steps with a railing? 3  -VK 3  -AV     To walk in hospital room? 3  -VK 3  -AV     AM-PAC 6 Clicks Score (PT) 19  -VK 19  -AV     Highest Level of Mobility Goal 6 --> Walk 10 steps or more  -VK 6 --> Walk 10 steps or more  -AV        Functional Assessment    Outcome Measure Options -- AM-PAC 6 Clicks Basic Mobility (PT)  -AV               User Key  (r) = Recorded By, (t) = Taken By, (c) = Cosigned By      Initials Name Provider Type    AV Shimon Navarro, PT Physical Therapist    Kat Fernandez PTA Physical Therapist Assistant                     Time Calculation:    PT Charges       Row Name 05/15/24 1202             Time Calculation    PT Received On 05/15/24  -VK         Timed Charges    84923 - PT Therapeutic Exercise Minutes 29  -VK      18921 - Gait Training Minutes  10  -VK      51570 - PT Therapeutic Activity Minutes 8  -VK         SNF Physical Therapy Minutes    Skilled Minutes- PT 47 min  -VK         Total Minutes    Timed Charges Total Minutes 47  -VK       Total Minutes 47  -VK                User Key  (r) = Recorded By, (t) = Taken By, (c) = Cosigned By      Initials Name Provider Type    Kat Fernandez PTA Physical Therapist Assistant                  Therapy Charges for Today       Code Description Service Date  Service Provider Modifiers Qty    94031305139 HC PT THER PROC EA 15 MIN 5/15/2024 Kat Price, PTA GP 2    23423978614 HC GAIT TRAINING EA 15 MIN 5/15/2024 Kat Price PTA GP 1            PT G-Codes  Outcome Measure Options: AM-PAC 6 Clicks Daily Activity (OT), Optimal Instrument  AM-PAC 6 Clicks Score (PT): 19  AM-PAC 6 Clicks Score (OT): 22    Kat Price PTA  5/15/2024

## 2024-05-15 NOTE — PROGRESS NOTES
[facility]   Hospitalist Progress Note       Patient Name: Laura Cardenas  : 1940  MRN: [id number]  Primary Care Physician: Ari Brady MD  Date of admission: 2024  Today's Date: 5/15/2024  Room / Bed:   308/2  Subjective   Chief Complaint: Weakness     HPI:     Laura Cardenas is a 83 y.o. female past medical history significant for mixed stress and urge incontinence, hyperlipidemia, prediabetes, history of osteoporosis that initially presents to the ED with hip pain patient was noted to have left hip fracture admitted to the hospitalist service orthopedic surgery consulted patient underwent left total hip arthroplasty on 5/10/2024 by Dr. Cruz without complications postoperative course unremarkable seen by PT and OT deemed a good candidate for inpatient rehab is been transferred to skilled nursing facility at [facility]    Interval Followup: 5/15/2024    Up in recliner.  Tolerating physical therapy.  Left hip pain reasonably controlled  No new issues or overnight complaints  Remains medically stable  Reports some constipation      REVIEW OF SYSTEMS:   Left hip pain  Objective   Temp:  [97.9 °F (36.6 °C)-98.6 °F (37 °C)] 98.6 °F (37 °C)  Heart Rate:  [86-88] 88  Resp:  [18] 18  BP: (116-132)/(46-47) 116/46  PHYSICAL EXAM   CON: WN. WD. NAD.   NECK:  No thyromegaly. No stridor.   RESP:  CTA. No wheezes. No crackles.  No work of breathing or tachypnea.   CV:  Rhythm regular. Rate WNL. No murmur noted.  No edema.  GI:  Soft and nontender. Nondistended.    EXT: Left hip dressing intact  PSYCH:  Alert. Oriented. Normal affect and mood.  NEURO:  No dysarthria or aphasia. No unilateral weakness or paresthesia.  SKIN: No chronic venous stasis changes or varicosities.  No cellulitis  Results from last 7 days   Lab Units 24  0443 24  0319 24  0331 24  0310 05/10/24  0259 24  1811   WBC 10*3/mm3 10.94* 8.67 11.45* 9.50 10.18 11.83*   HEMOGLOBIN g/dL 7.6* 7.6* 8.1* 10.1* 13.0 13.9    HEMATOCRIT % 23.7* 23.2* 24.3* 31.7* 39.5 41.2   PLATELETS 10*3/mm3 182 151 133* 195 267 287     Results from last 7 days   Lab Units 05/14/24  0443 05/13/24  0319 05/12/24  0331 05/11/24  0310 05/10/24  0259 05/09/24  1811   SODIUM mmol/L 138 140 134* 136 137 140   POTASSIUM mmol/L 3.8 4.0 3.9 4.5 4.2 3.7   CO2 mmol/L 26.9 26.8 26.0 25.2 26.0 25.1   CHLORIDE mmol/L 105 106 100 103 104 103   ANION GAP mmol/L 6.1 7.2 8.0 7.8 7.0 11.9   BUN mg/dL 13 18 21 21 23 22   CREATININE mg/dL 0.61 0.72 0.77 0.87 0.70 0.76   GLUCOSE mg/dL 120* 122* 135* 163* 127* 116*     Results from last 7 days   Lab Units 05/09/24  1811   INR  1.07     COMPLEXITY OF DATA / DECISION MAKING     []  Moderate: One acute illness or mild exacerbation of chronic or 2 stable chronic or tx side effects   []  High:  Severe acute illness or severe exacerbation of chronic - potential for major debility / life threatening         I have personally reviewed the results from the time of this admission to 5/15/2024 16:54 EDT:  []  Laboratory:  []  Microbiology: []  Radiology:  []  Telemetry:   []  Cardiology/Vascular:  []  Pathology:  []  Prior external records:  []  Independent historian provided additional details:      []  Discussed case with specialists:    []  Independent interpretation of ECG/Imaging etc:             []  Moderate: Rx management, low risk surgery, suboptimal social situation   []  High:  Rx with close monitoring for toxicity, mod-high risk surgery, DNR decision, Comfort initiated, IV pain meds    Assessment / Plan   Assessment:    Weakness after recent hospitalization  Dyslipidemia  Stress/urge urinary incontinence  Osteoporosis  Prediabetes  Hx avascular necrosis/collapse femoral head left hip  Status post left total hip arthroplasty 5/10/24 by Dr. Cruz     Plan:    Monitor Hgb periodically.  CBC in the morning  Continue iron supplement  Glucose diet controlled.  Has not been requiring SSI.  Will DC SSI/Accu-Cheks.  Continue daily  PT and OT  Weightbearing as tolerated  DVT prophylaxis with aspirin per orthopedist  Continue PPI for GI prophylaxis  Continue Ditropan for incontinence  Bowel regimen regimen as needed  Schedule gabapentin    Discussed plan with RN.  DVT prophylaxis:  No DVT prophylaxis order currently exists.      CODE STATUS:      Level Of Support Discussed With: Patient  Code Status (Patient has no pulse and is not breathing): CPR (Attempt to Resuscitate)  Medical Interventions (Patient has pulse or is breathing): Full Support       Electronically signed by BIANCA Ferro, 05/15/24, 4:54 PM EDT.

## 2024-05-15 NOTE — THERAPY TREATMENT NOTE
SNF - Occupational Therapy Treatment Note   [facility]    Patient Name: Laura Cardenas  : 1940    MRN: [id number]                              Today's Date: 5/15/2024       Admit Date: 2024    Visit Dx:     ICD-10-CM ICD-9-CM   1. Decreased activities of daily living (ADL)  Z78.9 V49.89   2. Difficulty walking  R26.2 719.7     Patient Active Problem List   Diagnosis    Gastro-esophageal reflux disease without esophagitis    Mixed hyperlipidemia    Impaired fasting glucose    Vitamin D deficiency    Primary osteoarthritis involving multiple joints    Age-related osteoporosis without current pathological fracture    Suprapubic fullness    Medicare annual wellness visit, subsequent    Mixed stress and urge urinary incontinence    Non-seasonal allergic rhinitis due to pollen    History of DVT of lower extremity    Left lumbar radiculopathy    Hip fx, left, closed, initial encounter    Closed fracture of left hip    Physical debility     Past Medical History:   Diagnosis Date    Acute embolism and thrombosis of deep vein of left lower extremity     Age-related osteoporosis without current pathological fracture     Disorder of bone, unspecified     Diverticulosis     DJD (degenerative joint disease)     GERD without esophagitis     History of transfusion     59 years ago for childbirth    Mixed hyperlipidemia     Primary generalized (osteo)arthritis     Spinal headache      Past Surgical History:   Procedure Laterality Date    BACK SURGERY      BACK SURGERY      S/P Lumbar    BREAST BIOPSY      Breast Bx (81 & 98)    CATARACT EXTRACTION      2015 L Cataract 2015 R Cataract    COLONOSCOPY      DR. AWAD    COLONOSCOPY N/A 2021    Procedure: COLONOSCOPY, WITH HOT SNARE POLYPECTOMY,;  Surgeon: Tony Thomas MD;  Location: [facility] ENDOSCOPY;  Service: Gastroenterology;  Laterality: N/A;  DIVERTICULOSIS, COLON POLYP    REPLACEMENT TOTAL KNEE Bilateral     04/10/2019 right knee  replacement 01/2020 knee replacement left    TONSILLECTOMY      10 YEARS OLD    TOTAL HIP ARTHROPLASTY Left 5/10/2024    Procedure: TOTAL HIP ARTHROPLASTY;  Surgeon: Ivan Cruz MD;  Location: [facility] MAIN OR;  Service: Orthopedics;  Laterality: Left;      General Information       Row Name 05/15/24 0832          OT Time and Intention    Document Type therapy note (daily note)  -     Mode of Treatment individual therapy;occupational therapy  -       Row Name 05/15/24 0832          General Information    Patient Profile Reviewed yes  -     Existing Precautions/Restrictions fall  -       Row Name 05/15/24 0832          Safety Issues, Functional Mobility    Impairments Affecting Function (Mobility) balance;endurance/activity tolerance;pain  -               User Key  (r) = Recorded By, (t) = Taken By, (c) = Cosigned By      Initials Name Provider Type     Sharmila Morelos OT Occupational Therapist                     Mobility/ADL's       Row Name 05/15/24 0833          Transfers    Transfers bed-chair transfer;sit-stand transfer;stand-sit transfer;toilet transfer  -       Row Name 05/15/24 0833          Bed-Chair Transfer    Bed-Chair [place] (Transfers) supervision  -     Assistive Device (Bed-Chair Transfers) walker, front-wheeled  -       Row Name 05/15/24 0833          Sit-Stand Transfer    Sit-Stand [place] (Transfers) modified independence  -     Assistive Device (Sit-Stand Transfers) walker, front-wheeled  -       Row Name 05/15/24 0833          Stand-Sit Transfer    Stand-Sit [place] (Transfers) modified independence  -     Assistive Device (Stand-Sit Transfers) walker, front-wheeled  -       Row Name 05/15/24 0833          Toilet Transfer    [place] Level (Toilet Transfer) supervision  -     Assistive Device (Toilet Transfer) raised toilet seat  -       Row Name 05/15/24 0833          Functional Mobility    Functional Mobility- Ind. Level supervision required  -      Functional Mobility- Device walker, front-wheeled  -     Functional Mobility- Comment Pt. ambulated to/from shower and therapy gym with spv using RW  -       Row Name 05/15/24 0833          Activities of Daily Living    BADL Assessment/Intervention bathing;upper body dressing;lower body dressing;grooming;toileting  -       Row Name 05/15/24 0833          Bathing Assessment/Intervention    [place] Level (Bathing) bathing skills;supervision  -       Row Name 05/15/24 0833          Lower Body Dressing Assessment/Training    [place] Level (Lower Body Dressing) minimum assist (75% patient effort)  -     Comment, (Lower Body Dressing) Assistance for socks only.  Pt. now interested in AE stating her  will assist.  -       Row Name 05/15/24 0833          Upper Body Dressing Assessment/Training    [place] Level (Upper Body Dressing) upper body dressing skills;set up  -       Row Name 05/15/24 0833          Grooming Assessment/Training    [place] Level (Grooming) grooming skills;set up  -       Row Name 05/15/24 0833          Toileting Assessment/Training    [place] Level (Toileting) toileting skills;supervision  -               User Key  (r) = Recorded By, (t) = Taken By, (c) = Cosigned By      Initials Name Provider Type     Sharmila Morelos OT Occupational Therapist                   Obj/Interventions       Row Name 05/15/24 0834          Motor Skills    Functional Endurance endurance training with Omnicycle x15 minutes  -     Therapeutic Exercise aerobic  -               User Key  (r) = Recorded By, (t) = Taken By, (c) = Cosigned By      Initials Name Provider Type     Sharmila Morelos OT Occupational Therapist                   Goals/Plan    No documentation.                  Clinical Impression       Row Name 05/15/24 0835          Plan of Care Review    Plan of Care Reviewed With patient  -     Progress improving  -     Outcome Evaluation Patient is able to  perform all basic adls using compensatory techs only requiring assistance for socks.  Plan is spv for functional mobility in performance of adls.  Pt. plans to d/c home with spouse with f/u outpatient services. Plan to continue POC for LTGs.  -       Row Name 05/15/24 0835          Therapy Plan Review/Discharge Plan (OT)    Anticipated Discharge Disposition (OT) home with home health  -               User Key  (r) = Recorded By, (t) = Taken By, (c) = Cosigned By      Initials Name Provider Type    GC Sharmila Morelos, TIFFANIE Occupational Therapist                   Outcome Measures       Row Name 05/15/24 0840          How much help from another is currently needed...    Putting on and taking off regular lower body clothing? 3  -GC     Bathing (including washing, rinsing, and drying) 3  -GC     Toileting (which includes using toilet bed pan or urinal) 4  -GC     Putting on and taking off regular upper body clothing 4  -GC     Taking care of personal grooming (such as brushing teeth) 4  -GC     Eating meals 4  -GC     AM-PAC 6 Clicks Score (OT) 22  -GC       Row Name 05/14/24 2113          How much help from another person do you currently need...    Turning from your back to your side while in flat bed without using bedrails? 4  -BR     Moving from lying on back to sitting on the side of a flat bed without bedrails? 3  -BR     Moving to and from a bed to a chair (including a wheelchair)? 3  -BR     Standing up from a chair using your arms (e.g., wheelchair, bedside chair)? 3  -BR     Climbing 3-5 steps with a railing? 3  -BR     To walk in hospital room? 3  -BR     AM-PAC 6 Clicks Score (PT) 19  -BR     Highest Level of Mobility Goal 6 --> Walk 10 steps or more  -BR       Row Name 05/15/24 0840          Functional Assessment    Outcome Measure Options AM-PAC 6 Clicks Daily Activity (OT);Optimal Instrument  -GC       Row Name 05/15/24 0840          Optimal Instrument    Optimal Instrument Optimal - 3  -GC      Bending/Stooping 2  -GC     Standing 1  -GC     Reaching 1  -GC               User Key  (r) = Recorded By, (t) = Taken By, (c) = Cosigned By      Initials Name Provider Type    Evelia Mae, RN Registered Nurse    Sharmila Meneses OT Occupational Therapist                  Section G  Mobility  Dressing - self performance: limited assistance (staff provide guided maneuvering of limbs or other non-weight bearing assistance)  Dressing support/assistance: One person assist  Toileting - self performance: independent  Toileting support/assistance: No setup or physical help  Personal hygiene - self performance: supervision (oversight, encourage)  Personal hygiene support/assistance: Setup help only  Bathing  Bathing - self performance: Supervision  Bathing support/assistance: Setup only     Range of Motion  Upper Extremity: No impairment  Section GG  SectionGG: Functional Ability/Goals, Adm  Self Care, Prior Functioning ([id number]): 3. Independent  Functional Cognition, Prior Functioning (QJ4347B): 3. Independent  Upper Extremity Range of Motion (WP7605N): No impairment  Self Care, Admission (Section GG)  Eating: Self-Care Admission Performance (PO4473W1): independent (06)  Oral Hygiene: Self-Care Admission Performance (CT1320D1): setup or clean-up assistance (05)  Toileting Hygiene: Self-Care Admission Performance (FX3298D2): supervision or touching assistance (04)  Shower/Bathe Self: Self-Care Admission Performance (IH6554K1): supervision or touching assistance (04)  Upper Body Dressing: Self-Care Admission Performance (GM5211Z8): setup or clean-up assistance (05)  Lower Body Dressing: Self-Care Admission Performance (ME2101S1): partial/moderate assistance (03)  Putting On/Taking Off Footwear: Self-Care Admission Performance (WG9542G8): partial/moderate assistance (03)  Personal Hygiene: Self-Care Admission Performance (FO9855M1): supervision or touching assistance (04)  Mobility, Admission Performance  (GC1309)  Toilet Transfer: Mobility Admission Performance (VS0977I0): supervision or touching assistance (04)  Tub/shower Transfer: Mobility Admission Performance (GZ2727RO3): supervision or touching assistance (04)  Section GG: Functional Ability/Goals, DC  Eating: Self-Care Discharge Goal (ZK0259U3): independent (06)  Oral Hygiene: Self-Care Discharge Goal (CO2525M1): independent (06)  Toileting Hygiene: Self-Care Discharge Goal (ZE6872L1): independent (06)  Shower/Bathe Self: Self-Care Discharge Goal (RK8138A8): independent (06)  Upper Body Dressing: Self-Care Discharge Goal (AC2406D2): independent (06)  Lower Body Dressing: Self-Care Discharge Goal (SC1760N0): independent (06)  Putting On/Taking Off Footwear: Self-Care Discharge Goal (DB0380S6): independent (06)  Personal Hygiene: Self-Care Discharge Goal (QJ7081A2): independent (06)  Mobility (GG), Discharge Goal (GO9390)  Toilet Transfer: Mobility Discharge Goal (XQ5281U9): independent (06)  Tub/shower Transfer: Mobility Discharge Goal (ZN0096GZ3): independent (06)          Occupational Therapy Education       Title: PT OT SLP Therapies (In Progress)       Topic: Occupational Therapy (In Progress)       Point: ADL training (Done)       Description:   Instruct learner(s) on proper safety adaptation and remediation techniques during self care or transfers.   Instruct in proper use of assistive devices.                  Learning Progress Summary             Patient Acceptance, E, VU,NR by  at 5/14/2024 1151                         Point: Home exercise program (Not Started)       Description:   Instruct learner(s) on appropriate technique for monitoring, assisting and/or progressing therapeutic exercises/activities.                  Learner Progress:  Not documented in this visit.              Point: Precautions (Done)       Description:   Instruct learner(s) on prescribed precautions during self-care and functional transfers.                  Learning Progress  Summary             Patient Acceptance, E, VU,NR by  at 5/14/2024 1151                         Point: Body mechanics (Done)       Description:   Instruct learner(s) on proper positioning and spine alignment during self-care, functional mobility activities and/or exercises.                  Learning Progress Summary             Patient Acceptance, E, VU,NR by  at 5/14/2024 1151                                         User Key       Initials Effective Dates Name Provider Type Discipline     06/16/21 -  Sharmila Morelos OT Occupational Therapist OT                  OT Recommendation and Plan  Planned Therapy Interventions (OT): activity tolerance training, adaptive equipment training, BADL retraining, functional balance retraining, neuromuscular control/coordination retraining, occupation/activity based interventions, patient/caregiver education/training, ROM/therapeutic exercise, strengthening exercise, transfer/mobility retraining  Therapy Frequency (OT): 5 times/wk  Plan of Care Review  Plan of Care Reviewed With: patient  Progress: improving  Outcome Evaluation: Patient is able to perform all basic adls using compensatory techs only requiring assistance for socks.  Plan is spv for functional mobility in performance of adls.  Pt. plans to d/c home with spouse with f/u outpatient services. Plan to continue POC for LTGs.     Time Calculation:   Evaluation Complexity (OT)  Review Occupational Profile/Medical/Therapy History Complexity: expanded/moderate complexity  Assessment, Occupational Performance/Identification of Deficit Complexity: 3-5 performance deficits  Clinical Decision Making Complexity (OT): detailed assessment/moderate complexity  Overall Complexity of Evaluation (OT): moderate complexity     Time Calculation- OT       Row Name 05/15/24 0840             Time Calculation- OT    OT Received On 05/15/24  -         Timed Charges    55763 - OT Therapeutic Exercise Minutes 15  -      49064 - OT  Therapeutic Activity Minutes 10  -GC      70294 - OT Self Care/Mgmt Minutes 29  -GC         SNF Occupational Therapy Minutes    Skilled Minutes- OT 54 min  -GC         Total Minutes    Timed Charges Total Minutes 54  -GC       Total Minutes 54  -GC                User Key  (r) = Recorded By, (t) = Taken By, (c) = Cosigned By      Initials Name Provider Type     Sharmila Morelos OT Occupational Therapist                  Therapy Charges for Today       Code Description Service Date Service Provider Modifiers Qty    91883248519 HC OT THERAPEUTIC ACT EA 15 MIN 5/14/2024 Sharmila Morelos, OT GO 1    80122806042 HC OT SELF CARE/MGMT/TRAIN EA 15 MIN 5/14/2024 Sharmila Morelos, OT GO 2    75707017611 HC OT EVAL MOD COMPLEXITY 3 5/14/2024 Sharmila Morelos, OT GO 1    86345650998 HC OT THER PROC EA 15 MIN 5/15/2024 Sharmila Morelos, OT GO 1    39993376123 HC OT THERAPEUTIC ACT EA 15 MIN 5/15/2024 Sharmila Morelos, OT GO 1    62938557622 HC OT SELF CARE/MGMT/TRAIN EA 15 MIN 5/15/2024 Sharmila Morelos, OT GO 2                 Sharmila Ortizil, OT  5/15/2024

## 2024-05-15 NOTE — PLAN OF CARE
Goal Outcome Evaluation:  Plan of Care Reviewed With: patient           Outcome Evaluation: Pt AO x 4 and VSS. She is a 1 assist to the bathroom with a walker. No c/o pain or discomfort noted. Left hip dressing dry and intact. Will continue with her plan of care. Call light and personal items within reach.

## 2024-05-16 LAB
ALBUMIN SERPL-MCNC: 2.7 G/DL (ref 3.5–5.2)
ALBUMIN/GLOB SERPL: 1.1 G/DL
ALP SERPL-CCNC: 80 U/L (ref 39–117)
ALT SERPL W P-5'-P-CCNC: 17 U/L (ref 1–33)
ANION GAP SERPL CALCULATED.3IONS-SCNC: 8.1 MMOL/L (ref 5–15)
AST SERPL-CCNC: 36 U/L (ref 1–32)
BILIRUB SERPL-MCNC: 0.5 MG/DL (ref 0–1.2)
BUN SERPL-MCNC: 14 MG/DL (ref 8–23)
BUN/CREAT SERPL: 25.5 (ref 7–25)
CALCIUM SPEC-SCNC: 8.5 MG/DL (ref 8.6–10.5)
CHLORIDE SERPL-SCNC: 107 MMOL/L (ref 98–107)
CO2 SERPL-SCNC: 25.9 MMOL/L (ref 22–29)
CREAT SERPL-MCNC: 0.55 MG/DL (ref 0.57–1)
DEPRECATED RDW RBC AUTO: 46 FL (ref 37–54)
EGFRCR SERPLBLD CKD-EPI 2021: 92.2 ML/MIN/1.73
ERYTHROCYTE [DISTWIDTH] IN BLOOD BY AUTOMATED COUNT: 13.6 % (ref 12.3–15.4)
GLOBULIN UR ELPH-MCNC: 2.5 GM/DL
GLUCOSE SERPL-MCNC: 107 MG/DL (ref 65–99)
HCT VFR BLD AUTO: 21.7 % (ref 34–46.6)
HEMOCCULT STL QL IA: NEGATIVE
HGB BLD-MCNC: 7.1 G/DL (ref 12–15.9)
MCH RBC QN AUTO: 30.7 PG (ref 26.6–33)
MCHC RBC AUTO-ENTMCNC: 32.7 G/DL (ref 31.5–35.7)
MCV RBC AUTO: 93.9 FL (ref 79–97)
PLATELET # BLD AUTO: 183 10*3/MM3 (ref 140–450)
PMV BLD AUTO: 9.7 FL (ref 6–12)
POTASSIUM SERPL-SCNC: 3.5 MMOL/L (ref 3.5–5.2)
PROT SERPL-MCNC: 5.2 G/DL (ref 6–8.5)
RBC # BLD AUTO: 2.31 10*6/MM3 (ref 3.77–5.28)
SODIUM SERPL-SCNC: 141 MMOL/L (ref 136–145)
WBC NRBC COR # BLD AUTO: 9.25 10*3/MM3 (ref 3.4–10.8)

## 2024-05-16 PROCEDURE — 97110 THERAPEUTIC EXERCISES: CPT

## 2024-05-16 PROCEDURE — 82274 ASSAY TEST FOR BLOOD FECAL: CPT | Performed by: PHYSICIAN ASSISTANT

## 2024-05-16 PROCEDURE — 97116 GAIT TRAINING THERAPY: CPT

## 2024-05-16 PROCEDURE — 99309 SBSQ NF CARE MODERATE MDM 30: CPT | Performed by: PHYSICIAN ASSISTANT

## 2024-05-16 PROCEDURE — 85027 COMPLETE CBC AUTOMATED: CPT | Performed by: PHYSICIAN ASSISTANT

## 2024-05-16 PROCEDURE — 80053 COMPREHEN METABOLIC PANEL: CPT | Performed by: PHYSICIAN ASSISTANT

## 2024-05-16 RX ORDER — DOCOSANOL 100 MG/G
1 CREAM TOPICAL
Status: DISCONTINUED | OUTPATIENT
Start: 2024-05-16 | End: 2024-05-21 | Stop reason: HOSPADM

## 2024-05-16 RX ADMIN — DOCOSANOL 1 APPLICATION: 100 CREAM TOPICAL at 14:09

## 2024-05-16 RX ADMIN — MONTELUKAST 10 MG: 10 TABLET, FILM COATED ORAL at 20:16

## 2024-05-16 RX ADMIN — PANTOPRAZOLE SODIUM 40 MG: 40 TABLET, DELAYED RELEASE ORAL at 06:03

## 2024-05-16 RX ADMIN — GABAPENTIN 300 MG: 300 CAPSULE ORAL at 20:16

## 2024-05-16 RX ADMIN — ASPIRIN 325 MG: 325 TABLET ORAL at 08:29

## 2024-05-16 RX ADMIN — FERROUS SULFATE TAB 325 MG (65 MG ELEMENTAL FE) 325 MG: 325 (65 FE) TAB at 08:28

## 2024-05-16 RX ADMIN — ESCITALOPRAM OXALATE 10 MG: 10 TABLET ORAL at 08:28

## 2024-05-16 RX ADMIN — GABAPENTIN 300 MG: 300 CAPSULE ORAL at 08:29

## 2024-05-16 RX ADMIN — Medication 600 MG: at 18:08

## 2024-05-16 RX ADMIN — ASPIRIN 325 MG: 325 TABLET ORAL at 20:16

## 2024-05-16 RX ADMIN — Medication 600 MG: at 08:28

## 2024-05-16 RX ADMIN — DOCOSANOL 1 APPLICATION: 100 CREAM TOPICAL at 21:29

## 2024-05-16 RX ADMIN — PRAVASTATIN SODIUM 40 MG: 20 TABLET ORAL at 20:16

## 2024-05-16 RX ADMIN — OXYBUTYNIN CHLORIDE 10 MG: 5 TABLET, EXTENDED RELEASE ORAL at 08:28

## 2024-05-16 RX ADMIN — DOCOSANOL 1 APPLICATION: 100 CREAM TOPICAL at 18:09

## 2024-05-16 NOTE — THERAPY TREATMENT NOTE
SNF - Physical Therapy Treatment Note   [facility]     Patient Name: Laura Cardenas  : 1940  MRN: [id number]  Today's Date: 2024      Visit Dx:    ICD-10-CM ICD-9-CM   1. Decreased activities of daily living (ADL)  Z78.9 V49.89   2. Difficulty walking  R26.2 719.7     Patient Active Problem List   Diagnosis    Gastro-esophageal reflux disease without esophagitis    Mixed hyperlipidemia    Impaired fasting glucose    Vitamin D deficiency    Primary osteoarthritis involving multiple joints    Age-related osteoporosis without current pathological fracture    Suprapubic fullness    Medicare annual wellness visit, subsequent    Mixed stress and urge urinary incontinence    Non-seasonal allergic rhinitis due to pollen    History of DVT of lower extremity    Left lumbar radiculopathy    Hip fx, left, closed, initial encounter    Closed fracture of left hip    Physical debility     Past Medical History:   Diagnosis Date    Acute embolism and thrombosis of deep vein of left lower extremity     Age-related osteoporosis without current pathological fracture     Disorder of bone, unspecified     Diverticulosis     DJD (degenerative joint disease)     GERD without esophagitis     History of transfusion     61 years ago for childbirth    Mixed hyperlipidemia     Primary generalized (osteo)arthritis     Spinal headache      Past Surgical History:   Procedure Laterality Date    BACK SURGERY      BACK SURGERY      S/P Lumbar    BREAST BIOPSY      Breast Bx (83 & 98)    CATARACT EXTRACTION      2015 L Cataract 2015 R Cataract    COLONOSCOPY      DR. AWAD    COLONOSCOPY N/A 2021    Procedure: COLONOSCOPY, WITH HOT SNARE POLYPECTOMY,;  Surgeon: Tony Thomas MD;  Location: [facility] ENDOSCOPY;  Service: Gastroenterology;  Laterality: N/A;  DIVERTICULOSIS, COLON POLYP    REPLACEMENT TOTAL KNEE Bilateral     04/10/2019 right knee replacement 2020 knee replacement left    TONSILLECTOMY       10 YEARS OLD    TOTAL HIP ARTHROPLASTY Left 5/10/2024    Procedure: TOTAL HIP ARTHROPLASTY;  Surgeon: Ivan Cruz MD;  Location: [facility] MAIN OR;  Service: Orthopedics;  Laterality: Left;       PT Assessment (Last 12 Hours)       PT Evaluation and Treatment       Row Name 05/16/24 1208          Physical Therapy Time and Intention    Document Type therapy note (daily note)  -WM     Mode of Treatment individual therapy;physical therapy  -WM     Patient Effort good  -WM     Symptoms Noted During/After Treatment fatigue  -WM       Row Name 05/16/24 1208          Sit-Stand Transfer    Sit-Stand [place] (Transfers) standby assist;verbal cues  -WM     Assistive Device (Sit-Stand Transfers) walker, front-wheeled  -WM       Row Name 05/16/24 1208          Stand-Sit Transfer    Stand-Sit [place] (Transfers) standby assist;verbal cues  -WM     Assistive Device (Stand-Sit Transfers) walker, front-wheeled  -WM       Row Name 05/16/24 1208          Gait/Stairs (Locomotion)    [place] Level (Gait) standby assist;verbal cues  -WM     Assistive Device (Gait) walker, front-wheeled  -WM     Distance in Feet (Gait) 300  -WM     Pattern (Gait) 4-point;step-through  -WM     Deviations/Abnormal Patterns (Gait) gait speed decreased  -WM     [place] Level (Stairs) contact guard;stand by assist  -WM     Handrail Location (Stairs) both sides  -WM     Number of Steps (Stairs) 2 x 2  -WM     Ascending Technique (Stairs) step-to-step  -WM     Descending Technique (Stairs) step-to-step  -WM     Stairs, Impairments strength decreased;impaired balance  -WM       Row Name 05/16/24 1208          Safety Issues, Functional Mobility    Impairments Affecting Function (Mobility) balance;endurance/activity tolerance;strength  -WM       Row Name 05/16/24 1208          Aerobic Exercise    Time Performed (Aerobic Exercise) Nustep x 15 minutes, load 2  -WM       Row Name             Wound 05/10/24 1832 Left anterior greater trochanter  Incision    Wound - Properties Group Placement Date: 05/10/24  -HK Placement Time: 1832 -HK Side: Left  -HK Orientation: anterior  -HK Location: greater trochanter  -HK Primary Wound Type: Incision  -HK    Retired Wound - Properties Group Placement Date: 05/10/24  -HK Placement Time: 1832 -HK Side: Left  -HK Orientation: anterior  -HK Location: greater trochanter  -HK Primary Wound Type: Incision  -HK    Retired Wound - Properties Group Date first assessed: 05/10/24  -HK Time first assessed: 1832 -HK Side: Left  -HK Location: greater trochanter  -HK Primary Wound Type: Incision  -HK      Row Name 05/16/24 1208          Positioning and Restraints    Pre-Treatment Position sitting in chair/recliner  -WM     Post Treatment Position bed  -WM     In Bed sitting EOB;call light within reach;encouraged to call for assist;with family/caregiver  -       Row Name 05/16/24 1208          Progress Summary (PT)    Progress Toward Functional Goals (PT) progress toward functional goals is good  -               User Key  (r) = Recorded By, (t) = Taken By, (c) = Cosigned By      Initials Name Provider Type    Starr Frost, RN Registered Nurse    Michael Gorman PTA Physical Therapist Assistant                  Section G              Section GG                       Physical Therapy Education       Title: PT OT SLP Therapies (In Progress)       Topic: Physical Therapy (Done)       Point: Mobility training (Done)       Learning Progress Summary             Patient Acceptance, E,TB, VU by AV at 5/14/2024 1335                         Point: Home exercise program (Done)       Learning Progress Summary             Patient Acceptance, E,TB, VU by AV at 5/14/2024 1335                         Point: Body mechanics (Done)       Learning Progress Summary             Patient Acceptance, E,TB, VU by AV at 5/14/2024 1335                         Point: Precautions (Done)       Learning Progress Summary             Patient Acceptance,  E,TB, VU by AV at 5/14/2024 1335                                         User Key       Initials Effective Dates Name Provider Type Discipline    AV 06/11/21 -  Shimon Navarro, PT Physical Therapist PT                  PT Recommendation and Plan     Progress Summary (PT)  Progress Toward Functional Goals (PT): progress toward functional goals is good   Outcome Measures       Row Name 05/16/24 1212 05/15/24 1200 05/14/24 1050       How much help from another person do you currently need...    Turning from your back to your side while in flat bed without using bedrails? 4  -WM 4  -VK 4  -AV    Moving from lying on back to sitting on the side of a flat bed without bedrails? 3  -WM 3  -VK 3  -AV    Moving to and from a bed to a chair (including a wheelchair)? 3  -WM 3  -VK 3  -AV    Standing up from a chair using your arms (e.g., wheelchair, bedside chair)? 3  -WM 3  -VK 3  -AV    Climbing 3-5 steps with a railing? 3  -WM 3  -VK 3  -AV    To walk in hospital room? 3  -WM 3  -VK 3  -AV    AM-PAC 6 Clicks Score (PT) 19  -WM 19  -VK 19  -AV    Highest Level of Mobility Goal 6 --> Walk 10 steps or more  -WM 6 --> Walk 10 steps or more  -VK 6 --> Walk 10 steps or more  -AV       Functional Assessment    Outcome Measure Options -- -- AM-PAC 6 Clicks Basic Mobility (PT)  -AV              User Key  (r) = Recorded By, (t) = Taken By, (c) = Cosigned By      Initials Name Provider Type    Michael Gorman, SHERICE Physical Therapist Assistant    AV Shimon Navarro, PT Physical Therapist    Kat Fernandez PTA Physical Therapist Assistant                     Time Calculation:    PT Charges       Row Name 05/16/24 1205             Time Calculation    PT Received On 05/16/24  -WM         Timed Charges    81393 - PT Therapeutic Exercise Minutes 15  -WM      57180 - Gait Training Minutes  10  -WM      21293 - PT Therapeutic Activity Minutes 4  -WM         SNF Physical Therapy Minutes    Skilled Minutes- PT 29 min  -WM          Total Minutes    Timed Charges Total Minutes 29  -WM       Total Minutes 29  -WM                User Key  (r) = Recorded By, (t) = Taken By, (c) = Cosigned By      Initials Name Provider Type    Michael Gorman PTA Physical Therapist Assistant                      PT G-Codes  Outcome Measure Options: AM-PAC 6 Clicks Daily Activity (OT), Optimal Instrument  AM-PAC 6 Clicks Score (PT): 19  AM-PAC 6 Clicks Score (OT): 22    Michael Wong PTA  5/16/2024

## 2024-05-16 NOTE — CONSULTS
"Nutrition Services    Patient Name: Laura Cardenas  YOB: 1940  MRN: [id number]  Admission date: 5/13/2024      CLINICAL NUTRITION ASSESSMENT      Reason for Assessment  Nursing Facility Admission   H&P:  Past Medical History:   Diagnosis Date    Acute embolism and thrombosis of deep vein of left lower extremity     Age-related osteoporosis without current pathological fracture     Disorder of bone, unspecified     Diverticulosis     DJD (degenerative joint disease)     GERD without esophagitis     History of transfusion     59 years ago for childbirth    Mixed hyperlipidemia     Primary generalized (osteo)arthritis     Spinal headache         Current Problems:   Active Hospital Problems    Diagnosis     **Physical debility         Nutrition/Diet History         Narrative   Upon my visit, patient awake laying in bed, pleasant. Patient denies any recent weight loss.  #s. Reports that her appetite had decreased since coming to hospital but notices it has improved today. Reports eating both her breakfast and lunch today. Chart graphics reveal % meal consumption. Patient has both false and natural teeth. Denies difficulty chewing or swallowing. Reports some fever blisters that cause some mouth pain. Denies ONS at home. +BM 5/16.      Anthropometrics        Current Height, Weight Height: 157.5 cm (62.01\")  Weight: 78 kg (171 lb 15.3 oz)   Current BMI Body mass index is 31.44 kg/m².   BMI Classification Obese Class I   % %   Adjusted Body Weight (ABW) 61 kg   Weight Hx  Wt Readings from Last 30 Encounters:   05/16/24 1528 78 kg (171 lb 15.3 oz)   05/13/24 1836 78 kg (171 lb 15.3 oz)   05/10/24 0036 74.7 kg (164 lb 10.9 oz)   05/09/24 1645 73.9 kg (162 lb 14.7 oz)   05/09/24 1507 73.9 kg (163 lb)   05/07/24 0954 73.9 kg (163 lb)   05/02/24 1154 74 kg (163 lb 2.3 oz)   04/30/24 1557 73.5 kg (162 lb)   03/26/24 0935 75.5 kg (166 lb 6.4 oz)   02/22/24 0905 74.4 kg (164 lb)   12/21/23 0759 " "74.4 kg (164 lb)   09/26/23 0919 74.4 kg (164 lb)   03/28/23 1006 74.5 kg (164 lb 3.2 oz)   03/21/23 1312 74.5 kg (164 lb 3.2 oz)   09/30/22 1124 75.9 kg (167 lb 6.4 oz)   09/14/22 1249 74.4 kg (164 lb)   03/28/22 0811 73.5 kg (162 lb)   12/01/21 0606 78 kg (171 lb 15.3 oz)   11/30/21 1058 77.1 kg (169 lb 15.6 oz)   10/29/21 1009 77 kg (169 lb 12.1 oz)   10/06/21 1103 77 kg (169 lb 12.8 oz)   07/30/20 0000 75.7 kg (167 lb)   04/07/20 0000 75.7 kg (167 lb)   02/18/20 0000 70.3 kg (155 lb)   01/21/20 0000 72.1 kg (159 lb)   11/21/19 0000 73.9 kg (163 lb)   05/23/19 0000 74.4 kg (164 lb)   04/25/19 0000 74.4 kg (164 lb)   03/14/19 0000 74.8 kg (165 lb)          Wt Change Observation Trending up     Estimated/Assessed Needs  Estimated Needs based on: Adjusted Body Weight 61 kg       Energy Requirements 25-30 kcal/kg   EST Needs (kcal/day) 1603-2433 kcal       Protein Requirements 0.8-1.0 g/kg   EST Daily Needs (g/day) 49-61 g       Fluid Requirements 1 ml/kcal    Estimated Needs (mL/day) 1936-4056 ml     Labs/Medications         Pertinent Labs Reviewed.   Results from last 7 days   Lab Units 05/16/24  0428 05/14/24  0443 05/13/24  0319   SODIUM mmol/L 141 138 140   POTASSIUM mmol/L 3.5 3.8 4.0   CHLORIDE mmol/L 107 105 106   CO2 mmol/L 25.9 26.9 26.8   BUN mg/dL 14 13 18   CREATININE mg/dL 0.55* 0.61 0.72   CALCIUM mg/dL 8.5* 8.8 8.4*   BILIRUBIN mg/dL 0.5 0.4 0.2   ALK PHOS U/L 80 86 78   ALT (SGPT) U/L 17 15 12   AST (SGOT) U/L 36* 51* 46*   GLUCOSE mg/dL 107* 120* 122*     Results from last 7 days   Lab Units 05/16/24  0428 05/14/24  0443 05/13/24  0319 05/12/24  0331   MAGNESIUM mg/dL  --  2.0 1.9 1.9   PHOSPHORUS mg/dL  --  2.6 1.9* 3.4   HEMOGLOBIN g/dL 7.1* 7.6* 7.6* 8.1*   HEMATOCRIT % 21.7* 23.7* 23.2* 24.3*     No results found for: \"COVID19\"  Lab Results   Component Value Date    HGBA1C 6.60 (H) 03/19/2024         Pertinent Medications Reviewed.     Malnutrition Severity Assessment              Nutrition " Diagnosis         Nutrition Dx Problem 1 No nutrition diagnosis at this time related to  N/A  as evidenced by  N/A     Nutrition Intervention           Current Nutrition Orders & Evaluation of Intake       Current PO Diet Diet: Regular/House; Fluid Consistency: Thin (IDDSI 0)   Supplement No active supplement orders           Nutrition Intervention/Prescription        If po intake becomes < 50%, ONS indicated.         Medical Nutrition Therapy/Nutrition Education          Learner     Readiness Patient  Acceptance     Method     Response Explanation  Verbalizes understanding     Monitor/Evaluation        Monitor Per protocol, PO intake, POC/GOC     Nutrition Discharge Plan         To be determined     Electronically signed by:  Madhuri Tejada RD  05/16/24 15:29 EDT

## 2024-05-16 NOTE — PROGRESS NOTES
[facility]   Hospitalist Progress Note       Patient Name: Laura Cardenas  : 1940  MRN: [id number]  Primary Care Physician: Ari Brady MD  Date of admission: 2024  Today's Date: 2024  Room / Bed:   308/2  Subjective   Chief Complaint: Weakness     HPI:     Laura Cardenas is a 83 y.o. female past medical history significant for mixed stress and urge incontinence, hyperlipidemia, prediabetes, history of osteoporosis that initially presents to the ED with hip pain patient was noted to have left hip fracture admitted to the hospitalist service orthopedic surgery consulted patient underwent left total hip arthroplasty on 5/10/2024 by Dr. Cruz without complications postoperative course unremarkable seen by PT and OT deemed a good candidate for inpatient rehab is been transferred to skilled nursing facility at [facility]    Interval Followup: 2024    Up in recliner.  Tolerating physical therapy.  Left hip pain reasonably controlled  Hgb 7-7.5 range, stable.  She has significant bruising left thigh-lower extremity from recent fracture/surgery.  Reports some constipation  122/46  On room air  Denies any lightheadedness or dizziness.  Able to stand up without difficulty.      REVIEW OF SYSTEMS:   Left hip pain  Objective   Temp:  [98.1 °F (36.7 °C)-100 °F (37.8 °C)] 98.1 °F (36.7 °C)  Heart Rate:  [74-89] 74  Resp:  [18-20] 20  BP: (106-122)/(46-55) 122/46  PHYSICAL EXAM   CON: WN. WD. NAD.   NECK:  No thyromegaly. No stridor.   RESP:  CTA. No wheezes. No crackles.  No work of breathing or tachypnea.   CV:  Rhythm regular. Rate WNL. No murmur noted.  No edema.  GI:  Soft and nontender. Nondistended.  Small faint bruise left hip/flank  EXT: Left hip dressing intact.  Significant bruising left thigh/lower leg  PSYCH:  Alert. Oriented. Normal affect and mood.  NEURO:  No dysarthria or aphasia. No unilateral weakness or paresthesia.  SKIN: No chronic venous stasis changes or varicosities.  No  cellulitis  Results from last 7 days   Lab Units 05/16/24  0428 05/14/24  0443 05/13/24  0319 05/12/24  0331 05/11/24  0310 05/10/24  0259   WBC 10*3/mm3 9.25 10.94* 8.67 11.45* 9.50 10.18   HEMOGLOBIN g/dL 7.1* 7.6* 7.6* 8.1* 10.1* 13.0   HEMATOCRIT % 21.7* 23.7* 23.2* 24.3* 31.7* 39.5   PLATELETS 10*3/mm3 183 182 151 133* 195 267     Results from last 7 days   Lab Units 05/16/24  0428 05/14/24  0443 05/13/24  0319 05/12/24  0331 05/11/24  0310 05/10/24  0259   SODIUM mmol/L 141 138 140 134* 136 137   POTASSIUM mmol/L 3.5 3.8 4.0 3.9 4.5 4.2   CO2 mmol/L 25.9 26.9 26.8 26.0 25.2 26.0   CHLORIDE mmol/L 107 105 106 100 103 104   ANION GAP mmol/L 8.1 6.1 7.2 8.0 7.8 7.0   BUN mg/dL 14 13 18 21 21 23   CREATININE mg/dL 0.55* 0.61 0.72 0.77 0.87 0.70   GLUCOSE mg/dL 107* 120* 122* 135* 163* 127*           COMPLEXITY OF DATA / DECISION MAKING     []  Moderate: One acute illness or mild exacerbation of chronic or 2 stable chronic or tx side effects   []  High:  Severe acute illness or severe exacerbation of chronic - potential for major debility / life threatening         I have personally reviewed the results from the time of this admission to 5/16/2024 18:39 EDT:  []  Laboratory:  []  Microbiology: []  Radiology:  []  Telemetry:   []  Cardiology/Vascular:  []  Pathology:  []  Prior external records:  []  Independent historian provided additional details:      []  Discussed case with specialists:    []  Independent interpretation of ECG/Imaging etc:             []  Moderate: Rx management, low risk surgery, suboptimal social situation   []  High:  Rx with close monitoring for toxicity, mod-high risk surgery, DNR decision, Comfort initiated, IV pain meds    Assessment / Plan   Assessment:    Weakness after recent hospitalization  Dyslipidemia  Stress/urge urinary incontinence  Osteoporosis  Prediabetes  Hx avascular necrosis/collapse femoral head left hip  Status post left total hip arthroplasty 5/10/24 by   Nancy  Recent postop anemia     Plan:    H&H in the morning.  Monitor Hgb periodically.    Continue iron supplement  Glucose diet controlled.  Has not been requiring SSI.  Will DC SSI/Accu-Cheks.  Continue daily PT and OT  Weightbearing as tolerated  DVT prophylaxis with aspirin per orthopedist  Continue PPI for GI prophylaxis  Continue Ditropan for incontinence  Bowel regimen regimen as needed  Schedule gabapentin    Discussed plan with RN.  DVT prophylaxis:  No DVT prophylaxis order currently exists.      CODE STATUS:      Level Of Support Discussed With: Patient  Code Status (Patient has no pulse and is not breathing): CPR (Attempt to Resuscitate)  Medical Interventions (Patient has pulse or is breathing): Full Support       Electronically signed by BIANCA Ferro, 05/15/24, 4:54 PM EDT.

## 2024-05-16 NOTE — THERAPY TREATMENT NOTE
SNF - Occupational Therapy Treatment Note   [facility]    Patient Name: Laura Cardenas  : 1940    MRN: [id number]                              Today's Date: 2024       Admit Date: 2024    Visit Dx:     ICD-10-CM ICD-9-CM   1. Decreased activities of daily living (ADL)  Z78.9 V49.89   2. Difficulty walking  R26.2 719.7     Patient Active Problem List   Diagnosis    Gastro-esophageal reflux disease without esophagitis    Mixed hyperlipidemia    Impaired fasting glucose    Vitamin D deficiency    Primary osteoarthritis involving multiple joints    Age-related osteoporosis without current pathological fracture    Suprapubic fullness    Medicare annual wellness visit, subsequent    Mixed stress and urge urinary incontinence    Non-seasonal allergic rhinitis due to pollen    History of DVT of lower extremity    Left lumbar radiculopathy    Hip fx, left, closed, initial encounter    Closed fracture of left hip    Physical debility     Past Medical History:   Diagnosis Date    Acute embolism and thrombosis of deep vein of left lower extremity     Age-related osteoporosis without current pathological fracture     Disorder of bone, unspecified     Diverticulosis     DJD (degenerative joint disease)     GERD without esophagitis     History of transfusion     59 years ago for childbirth    Mixed hyperlipidemia     Primary generalized (osteo)arthritis     Spinal headache      Past Surgical History:   Procedure Laterality Date    BACK SURGERY      BACK SURGERY      S/P Lumbar    BREAST BIOPSY      Breast Bx (81 & 98)    CATARACT EXTRACTION      2015 L Cataract 2015 R Cataract    COLONOSCOPY      DR. AWAD    COLONOSCOPY N/A 2021    Procedure: COLONOSCOPY, WITH HOT SNARE POLYPECTOMY,;  Surgeon: Tony Thomas MD;  Location: [facility];  Service: Gastroenterology;  Laterality: N/A;  DIVERTICULOSIS, COLON POLYP    REPLACEMENT TOTAL KNEE Bilateral     04/10/2019 right knee  replacement 01/2020 knee replacement left    TONSILLECTOMY      10 YEARS OLD    TOTAL HIP ARTHROPLASTY Left 5/10/2024    Procedure: TOTAL HIP ARTHROPLASTY;  Surgeon: Ivan Cruz MD;  Location: [facility] MAIN OR;  Service: Orthopedics;  Laterality: Left;      General Information       Row Name 05/16/24 1252          OT Time and Intention    Document Type therapy note (daily note)  -     Mode of Treatment individual therapy;occupational therapy  -       Row Name 05/16/24 1252          General Information    Patient Profile Reviewed yes  -     Existing Precautions/Restrictions fall  -       Row Name 05/16/24 1252          Safety Issues, Functional Mobility    Impairments Affecting Function (Mobility) balance;endurance/activity tolerance;strength  -               User Key  (r) = Recorded By, (t) = Taken By, (c) = Cosigned By      Initials Name Provider Type     Sharmila Morelos OT Occupational Therapist                     Mobility/ADL's       Row Name 05/16/24 1253          Transfers    Transfers sit-stand transfer;stand-sit transfer  -     Comment, (Transfers) chair w/ arms x2  -       Row Name 05/16/24 1253          Bed-Chair Transfer    Bed-Chair [place] (Transfers) supervision  -     Assistive Device (Bed-Chair Transfers) walker, front-wheeled  -       Row Name 05/16/24 1253          Sit-Stand Transfer    Sit-Stand [place] (Transfers) verbal cues;supervision  -     Assistive Device (Sit-Stand Transfers) walker, front-wheeled  -       Row Name 05/16/24 1253          Stand-Sit Transfer    Assistive Device (Stand-Sit Transfers) walker, front-wheeled  -[facility] Name 05/16/24 1253          Toilet Transfer    [place] Level (Toilet Transfer) supervision  -     Assistive Device (Toilet Transfer) raised toilet seat  -       Row Name 05/16/24 1253          Functional Mobility    Functional Mobility- Ind. Level supervision required  -     Functional Mobility- Device walker,  front-wheeled  -               User Key  (r) = Recorded By, (t) = Taken By, (c) = Cosigned By      Initials Name Provider Type    Sharmila Meneses OT Occupational Therapist                   Obj/Interventions       Row Name 05/16/24 1254          Shoulder (Therapeutic Exercise)    Shoulder (Therapeutic Exercise) wand exercises;strengthening exercise  3 different exercises  -     Shoulder Wand Exercises (Therapeutic Exercise) 10 repetitions;5 repetitions;3 sets  -       Row Name 05/16/24 1259          Elbow/Forearm (Therapeutic Exercise)    Elbow/Forearm (Therapeutic Exercise) strengthening exercise  -     Elbow/Forearm Strengthening (Therapeutic Exercise) 2 lb free weight;30 repititions;2 sets  -       Row Name 05/16/24 1252          Motor Skills    Functional Endurance Omnicycle x20 minutes  -     Therapeutic Exercise shoulder;elbow/forearm;aerobic  Patient participated in TE to facilitate strength and endurance to support adls.  TE included: Omnicycle, FWs and dowel ronen exercises.  -               User Key  (r) = Recorded By, (t) = Taken By, (c) = Cosigned By      Initials Name Provider Type     Sharmila Morelos OT Occupational Therapist                   Goals/Plan    No documentation.                  Clinical Impression       Row Name 05/16/24 1253          Plan of Care Review    Plan of Care Reviewed With patient  -     Progress improving  -     Outcome Evaluation Patient is progressing in all areas of basic ADLs requiring min assist to kandice socks only using compensatory techniques with improved balance, leg strength and pain.  Patient did present with limited activity tolerance today required rest breaks.  Patient hemoglobin was noted as 7.1 and may require a blood transfusion tomorrow per nursing therefore therapy provided as tolerated..  Plan to continue POC established for long-term goals.  -       Row Name 05/16/24 1258          Therapy Plan Review/Discharge Plan (OT)    Anticipated  Discharge Disposition (OT) home with home health  -GC               User Key  (r) = Recorded By, (t) = Taken By, (c) = Cosigned By      Initials Name Provider Type    GC Sharmila Morelos OT Occupational Therapist                   Outcome Measures       Row Name 05/16/24 1258          How much help from another is currently needed...    Putting on and taking off regular lower body clothing? 3  -GC     Bathing (including washing, rinsing, and drying) 3  -GC     Toileting (which includes using toilet bed pan or urinal) 4  -GC     Putting on and taking off regular upper body clothing 4  -GC     Taking care of personal grooming (such as brushing teeth) 4  -GC     Eating meals 4  -GC     AM-PAC 6 Clicks Score (OT) 22  -GC       Row Name 05/16/24 1212          How much help from another person do you currently need...    Turning from your back to your side while in flat bed without using bedrails? 4  -WM     Moving from lying on back to sitting on the side of a flat bed without bedrails? 3  -WM     Moving to and from a bed to a chair (including a wheelchair)? 3  -WM     Standing up from a chair using your arms (e.g., wheelchair, bedside chair)? 3  -WM     Climbing 3-5 steps with a railing? 3  -WM     To walk in hospital room? 3  -WM     AM-PAC 6 Clicks Score (PT) 19  -WM     Highest Level of Mobility Goal 6 --> Walk 10 steps or more  -       Row Name 05/16/24 1258          Functional Assessment    Outcome Measure Options AM-PAC 6 Clicks Daily Activity (OT);Optimal Instrument  -       Row Name 05/16/24 1258          Optimal Instrument    Optimal Instrument Optimal - 3  -GC     Bending/Stooping 2  -GC     Standing 1  -GC     Reaching 1  -GC               User Key  (r) = Recorded By, (t) = Taken By, (c) = Cosigned By      Initials Name Provider Type     Michael Wong PTA Physical Therapist Assistant    Sharmila Meneses OT Occupational Therapist                  Section G  Mobility  Dressing - self performance: limited  assistance (staff provide guided maneuvering of limbs or other non-weight bearing assistance)  Dressing support/assistance: One person assist  Toileting - self performance: independent  Toileting support/assistance: No setup or physical help  Personal hygiene - self performance: supervision (oversight, encourage)  Personal hygiene support/assistance: Setup help only  Bathing  Bathing - self performance: Supervision  Bathing support/assistance: Setup only     Range of Motion  Upper Extremity: No impairment  Section GG  SectionGG: Functional Ability/Goals, Adm  Self Care, Prior Functioning (EG9132E): 3. Independent  Functional Cognition, Prior Functioning (UN0711C): 3. Independent  Upper Extremity Range of Motion (IN4198S): No impairment  Self Care, Admission (Section GG)  Eating: Self-Care Admission Performance (DY1236Z5): independent (06)  Oral Hygiene: Self-Care Admission Performance (OE4094E1): setup or clean-up assistance (05)  Toileting Hygiene: Self-Care Admission Performance (OK3425F8): supervision or touching assistance (04)  Shower/Bathe Self: Self-Care Admission Performance (OC3347Q1): supervision or touching assistance (04)  Upper Body Dressing: Self-Care Admission Performance (TK5705V8): setup or clean-up assistance (05)  Lower Body Dressing: Self-Care Admission Performance (MT2288R9): partial/moderate assistance (03)  Putting On/Taking Off Footwear: Self-Care Admission Performance (HT7726U6): partial/moderate assistance (03)  Personal Hygiene: Self-Care Admission Performance (SR8475F8): supervision or touching assistance (04)  Mobility, Admission Performance (BD3381)  Toilet Transfer: Mobility Admission Performance (XK4737Q3): supervision or touching assistance (04)  Tub/shower Transfer: Mobility Admission Performance (JW5724QK7): supervision or touching assistance (04)  Section GG: Functional Ability/Goals, DC  Eating: Self-Care Discharge Goal (OZ3781B9): independent (06)  Oral Hygiene: Self-Care  Discharge Goal (LZ2790H1): independent (06)  Toileting Hygiene: Self-Care Discharge Goal (CX7052G8): independent (06)  Shower/Bathe Self: Self-Care Discharge Goal (NX3643P8): independent (06)  Upper Body Dressing: Self-Care Discharge Goal (DI5411X2): independent (06)  Lower Body Dressing: Self-Care Discharge Goal (OA9507F0): independent (06)  Putting On/Taking Off Footwear: Self-Care Discharge Goal (MB3386S6): independent (06)  Personal Hygiene: Self-Care Discharge Goal (AL4527P3): independent (06)  Mobility (GG), Discharge Goal (OF8031)  Toilet Transfer: Mobility Discharge Goal (XR1924K5): independent (06)  Tub/shower Transfer: Mobility Discharge Goal (MI1910WH3): independent (06)          Occupational Therapy Education       Title: PT OT SLP Therapies (In Progress)       Topic: Occupational Therapy (In Progress)       Point: ADL training (Done)       Description:   Instruct learner(s) on proper safety adaptation and remediation techniques during self care or transfers.   Instruct in proper use of assistive devices.                  Learning Progress Summary             Patient Acceptance, E, VU,NR by  at 5/14/2024 1151                         Point: Home exercise program (Not Started)       Description:   Instruct learner(s) on appropriate technique for monitoring, assisting and/or progressing therapeutic exercises/activities.                  Learner Progress:  Not documented in this visit.              Point: Precautions (Done)       Description:   Instruct learner(s) on prescribed precautions during self-care and functional transfers.                  Learning Progress Summary             Patient Acceptance, E, VU,NR by  at 5/14/2024 1151                         Point: Body mechanics (Done)       Description:   Instruct learner(s) on proper positioning and spine alignment during self-care, functional mobility activities and/or exercises.                  Learning Progress Summary             Patient  Acceptance, E, VU,NR by  at 5/14/2024 1151                                         User Key       Initials Effective Dates Name Provider Type Discipline     06/16/21 -  Sharmila Morelos OT Occupational Therapist OT                  OT Recommendation and Plan  Planned Therapy Interventions (OT): activity tolerance training, adaptive equipment training, BADL retraining, functional balance retraining, neuromuscular control/coordination retraining, occupation/activity based interventions, patient/caregiver education/training, ROM/therapeutic exercise, strengthening exercise, transfer/mobility retraining  Therapy Frequency (OT): 5 times/wk  Plan of Care Review  Plan of Care Reviewed With: patient  Progress: improving  Outcome Evaluation: Patient is progressing in all areas of basic ADLs requiring min assist to kandice socks only using compensatory techniques with improved balance, leg strength and pain.  Patient did present with limited activity tolerance today required rest breaks.  Patient hemoglobin was noted as 7.1 and may require a blood transfusion tomorrow per nursing therefore therapy provided as tolerated..  Plan to continue POC established for long-term goals.     Time Calculation:   Evaluation Complexity (OT)  Review Occupational Profile/Medical/Therapy History Complexity: expanded/moderate complexity  Assessment, Occupational Performance/Identification of Deficit Complexity: 3-5 performance deficits  Clinical Decision Making Complexity (OT): detailed assessment/moderate complexity  Overall Complexity of Evaluation (OT): moderate complexity     Time Calculation- OT       Row Name 05/16/24 1258 05/16/24 1205          Time Calculation- OT    OT Received On 05/16/24  - --        Timed Charges    00718 - OT Therapeutic Exercise Minutes 40  -GC --     26375 - Gait Training Minutes  -- 10  -WM     51213 - OT Therapeutic Activity Minutes 5  -GC --        SNF Occupational Therapy Minutes    Skilled Minutes- OT 45 min   -GC --        Total Minutes    Timed Charges Total Minutes 45  -GC 10  -WM      Total Minutes 45  -GC 10  -WM               User Key  (r) = Recorded By, (t) = Taken By, (c) = Cosigned By      Initials Name Provider Type    Michael Gorman PTA Physical Therapist Assistant    Sharmila Meneses OT Occupational Therapist                  Therapy Charges for Today       Code Description Service Date Service Provider Modifiers Qty    96187191142 HC OT THER PROC EA 15 MIN 5/15/2024 Sharmila Morelos OT GO 1    81940257516 HC OT THERAPEUTIC ACT EA 15 MIN 5/15/2024 Sharmila Morelos OT GO 1    68027422228 HC OT SELF CARE/MGMT/TRAIN EA 15 MIN 5/15/2024 Sharmila Morelos OT GO 2    37347189329 HC OT THER PROC EA 15 MIN 5/16/2024 Sharmila Morelos, OT GO 3                 Sharmila Morelos OT  5/16/2024

## 2024-05-16 NOTE — PLAN OF CARE
Goal Outcome Evaluation:           Progress: improving  Outcome Evaluation: Patient is alert and oriented x4. Pleasant with staff. Denies pain. Is one assist with transfer to bathroom with walker and gaitbelt. Today's HGB was at 7.1g/dL. Orders in for fecal occult blood but patient has not had BM this shift. Labs HGB and HCT scheduled for in the morning. Patient stated she feels like she is getting fever blisters. Provider notified and new order obtained for Abreva 5x daily. Med applied twice this shift per orders. Voices needs. Con't current POC.

## 2024-05-16 NOTE — PLAN OF CARE
Goal Outcome Evaluation:  Plan of Care Reviewed With: patient        Progress: improving  Outcome Evaluation: Alert and oriented x4; able to make needs known to staff. Transfers to  x1 person assist using rolling walker. Tylenol administered x1 for complaints of H/A with relief. Cepacol lozenge given x1 for sore throat with relief. Low grade temp of 100. Call light within reach; care plan ongoing.

## 2024-05-17 LAB
HCT VFR BLD AUTO: 22.9 % (ref 34–46.6)
HGB BLD-MCNC: 7.2 G/DL (ref 12–15.9)

## 2024-05-17 PROCEDURE — 97535 SELF CARE MNGMENT TRAINING: CPT

## 2024-05-17 PROCEDURE — 94799 UNLISTED PULMONARY SVC/PX: CPT

## 2024-05-17 PROCEDURE — 85014 HEMATOCRIT: CPT | Performed by: PHYSICIAN ASSISTANT

## 2024-05-17 PROCEDURE — 97110 THERAPEUTIC EXERCISES: CPT

## 2024-05-17 PROCEDURE — 85018 HEMOGLOBIN: CPT | Performed by: PHYSICIAN ASSISTANT

## 2024-05-17 PROCEDURE — 97116 GAIT TRAINING THERAPY: CPT

## 2024-05-17 PROCEDURE — 97530 THERAPEUTIC ACTIVITIES: CPT

## 2024-05-17 RX ADMIN — POLYETHYLENE GLYCOL 3350 17 G: 17 POWDER, FOR SOLUTION ORAL at 09:07

## 2024-05-17 RX ADMIN — OXYBUTYNIN CHLORIDE 10 MG: 5 TABLET, EXTENDED RELEASE ORAL at 09:05

## 2024-05-17 RX ADMIN — OXYCODONE 5 MG: 5 TABLET ORAL at 20:40

## 2024-05-17 RX ADMIN — POLYETHYLENE GLYCOL 3350 17 G: 17 POWDER, FOR SOLUTION ORAL at 09:05

## 2024-05-17 RX ADMIN — MONTELUKAST 10 MG: 10 TABLET, FILM COATED ORAL at 20:39

## 2024-05-17 RX ADMIN — Medication 600 MG: at 18:01

## 2024-05-17 RX ADMIN — ASPIRIN 325 MG: 325 TABLET ORAL at 20:39

## 2024-05-17 RX ADMIN — FERROUS SULFATE TAB 325 MG (65 MG ELEMENTAL FE) 325 MG: 325 (65 FE) TAB at 08:10

## 2024-05-17 RX ADMIN — SENNOSIDES AND DOCUSATE SODIUM 2 TABLET: 50; 8.6 TABLET ORAL at 09:04

## 2024-05-17 RX ADMIN — GABAPENTIN 300 MG: 300 CAPSULE ORAL at 09:05

## 2024-05-17 RX ADMIN — ESCITALOPRAM OXALATE 10 MG: 10 TABLET ORAL at 09:05

## 2024-05-17 RX ADMIN — Medication 600 MG: at 08:10

## 2024-05-17 RX ADMIN — PANTOPRAZOLE SODIUM 40 MG: 40 TABLET, DELAYED RELEASE ORAL at 06:16

## 2024-05-17 RX ADMIN — GABAPENTIN 300 MG: 300 CAPSULE ORAL at 20:40

## 2024-05-17 RX ADMIN — DOCOSANOL 1 APPLICATION: 100 CREAM TOPICAL at 06:16

## 2024-05-17 RX ADMIN — ASPIRIN 325 MG: 325 TABLET ORAL at 09:05

## 2024-05-17 RX ADMIN — DOCOSANOL 1 APPLICATION: 100 CREAM TOPICAL at 11:12

## 2024-05-17 RX ADMIN — DOCOSANOL 1 APPLICATION: 100 CREAM TOPICAL at 21:02

## 2024-05-17 RX ADMIN — DOCOSANOL 1 APPLICATION: 100 CREAM TOPICAL at 18:01

## 2024-05-17 RX ADMIN — PRAVASTATIN SODIUM 40 MG: 20 TABLET ORAL at 20:39

## 2024-05-17 NOTE — THERAPY TREATMENT NOTE
SNF - Physical Therapy Treatment Note   [facility]     Patient Name: Laura Cardenas  : 1940  MRN: [id number]  Today's Date: 2024      Visit Dx:    ICD-10-CM ICD-9-CM   1. Decreased activities of daily living (ADL)  Z78.9 V49.89   2. Difficulty walking  R26.2 719.7     Patient Active Problem List   Diagnosis    Gastro-esophageal reflux disease without esophagitis    Mixed hyperlipidemia    Impaired fasting glucose    Vitamin D deficiency    Primary osteoarthritis involving multiple joints    Age-related osteoporosis without current pathological fracture    Suprapubic fullness    Medicare annual wellness visit, subsequent    Mixed stress and urge urinary incontinence    Non-seasonal allergic rhinitis due to pollen    History of DVT of lower extremity    Left lumbar radiculopathy    Hip fx, left, closed, initial encounter    Closed fracture of left hip    Physical debility     Past Medical History:   Diagnosis Date    Acute embolism and thrombosis of deep vein of left lower extremity     Age-related osteoporosis without current pathological fracture     Disorder of bone, unspecified     Diverticulosis     DJD (degenerative joint disease)     GERD without esophagitis     History of transfusion     61 years ago for childbirth    Mixed hyperlipidemia     Primary generalized (osteo)arthritis     Spinal headache      Past Surgical History:   Procedure Laterality Date    BACK SURGERY      BACK SURGERY      S/P Lumbar    BREAST BIOPSY      Breast Bx (81 & 98)    CATARACT EXTRACTION      2015 L Cataract 2015 R Cataract    COLONOSCOPY      DR. AWAD    COLONOSCOPY N/A 2021    Procedure: COLONOSCOPY, WITH HOT SNARE POLYPECTOMY,;  Surgeon: Tony Thomas MD;  Location: [facility] ENDOSCOPY;  Service: Gastroenterology;  Laterality: N/A;  DIVERTICULOSIS, COLON POLYP    REPLACEMENT TOTAL KNEE Bilateral     04/10/2019 right knee replacement 2020 knee replacement left    TONSILLECTOMY       10 YEARS OLD    TOTAL HIP ARTHROPLASTY Left 5/10/2024    Procedure: TOTAL HIP ARTHROPLASTY;  Surgeon: Ivan Cruz MD;  Location: [facility] MAIN OR;  Service: Orthopedics;  Laterality: Left;       PT Assessment (Last 12 Hours)       PT Evaluation and Treatment       Row Name 05/17/24 1145          Physical Therapy Time and Intention    Document Type therapy note (daily note)  -WM     Mode of Treatment individual therapy;physical therapy  -WM     Patient Effort good  -WM     Symptoms Noted During/After Treatment fatigue  -       Row Name 05/17/24 1145          Sit-Stand Transfer    Sit-Stand [place] (Transfers) standby assist  -     Assistive Device (Sit-Stand Transfers) walker, front-wheeled  -       Row Name 05/17/24 1145          Stand-Sit Transfer    Stand-Sit [place] (Transfers) standby assist  -     Assistive Device (Stand-Sit Transfers) walker, front-wheeled  -       Row Name 05/17/24 1145          Gait/Stairs (Locomotion)    Anson Level (Gait) standby assist  -     Assistive Device (Gait) walker, front-wheeled  -     Distance in Feet (Gait) 300  -     Pattern (Gait) 4-point;step-through  -     Deviations/Abnormal Patterns (Gait) gait speed decreased  -       Row Name 05/17/24 1145          Safety Issues, Functional Mobility    Impairments Affecting Function (Mobility) balance;endurance/activity tolerance;strength  -       Row Name 05/17/24 1145          Balance    Balance Interventions standing;dynamic  Forward/d\backward walking, side stepping in parallel bars  -       Row Name 05/17/24 1145          Hip (Therapeutic Exercise)    Hip (Therapeutic Exercise) strengthening exercise  -     Hip Strengthening (Therapeutic Exercise) bilateral;aBduction;aDduction;marching while seated;mini squats;sitting;standing;2 lb free weight;resistance band;yellow;15 repititions;2 sets  -       Row Name 05/17/24 1145          Knee (Therapeutic Exercise)    Knee Strengthening  (Therapeutic Exercise) bilateral;LAQ (long arc quad);hamstring curls;sitting;2 lb free weight;resistance band;yellow;15 repititions;2 sets  -       Row Name 05/17/24 1145          Ankle (Therapeutic Exercise)    Ankle (Therapeutic Exercise) strengthening exercise  -     Ankle Strengthening (Therapeutic Exercise) bilateral;standing;15 repititions;2 sets  Heel/toe raises in parallel bars  -       Row Name 05/17/24 1145          Aerobic Exercise    Time Performed (Aerobic Exercise) NuStep x 15 minutes, load 2  -       Row Name             Wound 05/10/24 1832 Left anterior greater trochanter Incision    Wound - Properties Group Placement Date: 05/10/24  -HK Placement Time: 1832  -HK Side: Left  -HK Orientation: anterior  -HK Location: greater trochanter  -HK Primary Wound Type: Incision  -HK    Retired Wound - Properties Group Placement Date: 05/10/24  -HK Placement Time: 1832  -HK Side: Left  -HK Orientation: anterior  -HK Location: greater trochanter  -HK Primary Wound Type: Incision  -HK    Retired Wound - Properties Group Date first assessed: 05/10/24  -HK Time first assessed: 1832  -HK Side: Left  -HK Location: greater trochanter  -HK Primary Wound Type: Incision  -HK      Row Name 05/17/24 1145          Positioning and Restraints    Pre-Treatment Position bathroom  -     Post Treatment Position bed  -WM     In Bed supine;call light within reach;encouraged to call for assist  -       Row Name 05/17/24 1145          Progress Summary (PT)    Progress Toward Functional Goals (PT) progress toward functional goals is good  -               User Key  (r) = Recorded By, (t) = Taken By, (c) = Cosigned By      Initials Name Provider Type     Starr Carranza, RN Registered Nurse    Michael Gorman PTA Physical Therapist Assistant                  Section G              Section GG                       Physical Therapy Education       Title: PT OT SLP Therapies (In Progress)       Topic: Physical Therapy  (Done)       Point: Mobility training (Done)       Learning Progress Summary             Patient Acceptance, E,TB, VU by AV at 5/14/2024 1335                         Point: Home exercise program (Done)       Learning Progress Summary             Patient Acceptance, E,TB, VU by AV at 5/14/2024 1335                         Point: Body mechanics (Done)       Learning Progress Summary             Patient Acceptance, E,TB, VU by AV at 5/14/2024 1335                         Point: Precautions (Done)       Learning Progress Summary             Patient Acceptance, E,TB, VU by AV at 5/14/2024 1335                                         User Key       Initials Effective Dates Name Provider Type Discipline     06/11/21 -  Shimon Navarro, PT Physical Therapist PT                  PT Recommendation and Plan     Progress Summary (PT)  Progress Toward Functional Goals (PT): progress toward functional goals is good   Outcome Measures       Row Name 05/17/24 1150 05/16/24 1212 05/15/24 1200       How much help from another person do you currently need...    Turning from your back to your side while in flat bed without using bedrails? 4  -WM 4  -WM 4  -VK    Moving from lying on back to sitting on the side of a flat bed without bedrails? 4  -WM 3  -WM 3  -VK    Moving to and from a bed to a chair (including a wheelchair)? 3  -WM 3  -WM 3  -VK    Standing up from a chair using your arms (e.g., wheelchair, bedside chair)? 3  -WM 3  -WM 3  -VK    Climbing 3-5 steps with a railing? 3  -WM 3  -WM 3  -VK    To walk in hospital room? 3  -WM 3  -WM 3  -VK    AM-PAC 6 Clicks Score (PT) 20  -WM 19  -WM 19  -VK    Highest Level of Mobility Goal 6 --> Walk 10 steps or more  -WM 6 --> Walk 10 steps or more  -WM 6 --> Walk 10 steps or more  -VK              User Key  (r) = Recorded By, (t) = Taken By, (c) = Cosigned By      Initials Name Provider Type    WM Michael Wong, PTA Physical Therapist Assistant    Kat Fernandez PTA Physical  Therapist Assistant                     Time Calculation:    PT Charges       Row Name 05/17/24 1144             Time Calculation    PT Received On 05/17/24  -WM         Timed Charges    72174 - PT Therapeutic Exercise Minutes 30  -WM      07897 - Gait Training Minutes  8  -WM      83125 - PT Therapeutic Activity Minutes 4  -WM         SNF Physical Therapy Minutes    Skilled Minutes- PT 42 min  -WM         Total Minutes    Timed Charges Total Minutes 42  -WM       Total Minutes 42  -WM                User Key  (r) = Recorded By, (t) = Taken By, (c) = Cosigned By      Initials Name Provider Type     Michael Wong PTA Physical Therapist Assistant                  Therapy Charges for Today       Code Description Service Date Service Provider Modifiers Qty    26586164031 HC PT THER PROC EA 15 MIN 5/16/2024 Michael Wong PTA GP 1    07590833356 HC GAIT TRAINING EA 15 MIN 5/16/2024 Michael Wong PTA GP 1            PT G-Codes  Outcome Measure Options: AM-PAC 6 Clicks Daily Activity (OT), Optimal Instrument  AM-PAC 6 Clicks Score (PT): 20  AM-PAC 6 Clicks Score (OT): 23    Michael Wong PTA  5/17/2024

## 2024-05-17 NOTE — PLAN OF CARE
Goal Outcome Evaluation:  Plan of Care Reviewed With: patient        Progress: improving  Outcome Evaluation: Alert and oriented x4; able to make needs known to staff. Transfers to BR x1 person assist using gait belt and walker. No requests for pain medication this shift. Stool sent for occult blood; results negative. Lab work ordered for this morning. Call light within reach; care plan ongoing.

## 2024-05-17 NOTE — THERAPY TREATMENT NOTE
SNF - Occupational Therapy Treatment Note   [facility]    Patient Name: Laura Cardenas  : 1940    MRN: [id number]                              Today's Date: 2024       Admit Date: 2024    Visit Dx:     ICD-10-CM ICD-9-CM   1. Decreased activities of daily living (ADL)  Z78.9 V49.89   2. Difficulty walking  R26.2 719.7     Patient Active Problem List   Diagnosis    Gastro-esophageal reflux disease without esophagitis    Mixed hyperlipidemia    Impaired fasting glucose    Vitamin D deficiency    Primary osteoarthritis involving multiple joints    Age-related osteoporosis without current pathological fracture    Suprapubic fullness    Medicare annual wellness visit, subsequent    Mixed stress and urge urinary incontinence    Non-seasonal allergic rhinitis due to pollen    History of DVT of lower extremity    Left lumbar radiculopathy    Hip fx, left, closed, initial encounter    Closed fracture of left hip    Physical debility     Past Medical History:   Diagnosis Date    Acute embolism and thrombosis of deep vein of left lower extremity     Age-related osteoporosis without current pathological fracture     Disorder of bone, unspecified     Diverticulosis     DJD (degenerative joint disease)     GERD without esophagitis     History of transfusion     61 years ago for childbirth    Mixed hyperlipidemia     Primary generalized (osteo)arthritis     Spinal headache      Past Surgical History:   Procedure Laterality Date    BACK SURGERY      BACK SURGERY      S/P Lumbar    BREAST BIOPSY      Breast Bx (83 & 98)    CATARACT EXTRACTION      2015 L Cataract 2015 R Cataract    COLONOSCOPY      DR. AWAD    COLONOSCOPY N/A 2021    Procedure: COLONOSCOPY, WITH HOT SNARE POLYPECTOMY,;  Surgeon: Tony Thomas MD;  Location: [facility] ENDOSCOPY;  Service: Gastroenterology;  Laterality: N/A;  DIVERTICULOSIS, COLON POLYP    REPLACEMENT TOTAL KNEE Bilateral     04/10/2019 right knee  replacement 01/2020 knee replacement left    TONSILLECTOMY      10 YEARS OLD    TOTAL HIP ARTHROPLASTY Left 5/10/2024    Procedure: TOTAL HIP ARTHROPLASTY;  Surgeon: Ivan Cruz MD;  Location: [facility] MAIN OR;  Service: Orthopedics;  Laterality: Left;      General Information       Row Name 05/17/24 0814          OT Time and Intention    Document Type therapy note (daily note)  -     Mode of Treatment individual therapy;occupational therapy  -       Row Name 05/17/24 0814          General Information    Patient Profile Reviewed yes  -     Existing Precautions/Restrictions fall  -               User Key  (r) = Recorded By, (t) = Taken By, (c) = Cosigned By      Initials Name Provider Type     Sharmila Morelos OT Occupational Therapist                     Mobility/ADL's       Row Name 05/17/24 0814          Transfers    Transfers bed-chair transfer;sit-stand transfer;stand-sit transfer;toilet transfer  -       Row Name 05/17/24 0814          Bed-Chair Transfer    Bed-Chair Caroline (Transfers) supervision  -     Assistive Device (Bed-Chair Transfers) walker, front-wheeled  -       Row Name 05/17/24 0814          Sit-Stand Transfer    Sit-Stand Caroline (Transfers) supervision;modified independence  -     Assistive Device (Sit-Stand Transfers) walker, front-wheeled  -       Row Name 05/17/24 0814          Stand-Sit Transfer    Stand-Sit Caroline (Transfers) supervision;modified independence  -     Assistive Device (Stand-Sit Transfers) walker, front-wheeled  -       Row Name 05/17/24 0814          Toilet Transfer    Caroline Level (Toilet Transfer) supervision  -     Assistive Device (Toilet Transfer) raised toilet seat  -       Row Name 05/17/24 0814          Functional Mobility    Functional Mobility- Ind. Level supervision required  -     Functional Mobility- Device walker, front-wheeled  -     Functional Mobility- Comment Pt. ambulated to/from shower and therapy gym  with spv using RW  -       Row Name 05/17/24 0814          Activities of Daily Living    BADL Assessment/Intervention bathing;upper body dressing;lower body dressing;grooming;toileting  -       Row Name 05/17/24 0814          Bathing Assessment/Intervention    [place] Level (Bathing) bathing skills;supervision  -     Assistive Devices (Bathing) grab bar, tub/shower;hand-held shower spray hose;tub bench  -       Row Name 05/17/24 0814          Lower Body Dressing Assessment/Training    [place] Level (Lower Body Dressing) lower body dressing skills;minimum assist (75% patient effort);supervision  -     Comment, (Lower Body Dressing) Pt. plans to wear slip ons and  assist with socks.  Otherwise supervision using comepensatory techs for dressing LLE first/Vcs at times  -       Row Name 05/17/24 0814          Grooming Assessment/Training    [place] Level (Grooming) grooming skills  -       Row Name 05/17/24 0814          Toileting Assessment/Training    [place] Level (Toileting) toileting skills;supervision;modified independence  -               User Key  (r) = Recorded By, (t) = Taken By, (c) = Cosigned By      Initials Name Provider Type     Sharmila Morelos OT Occupational Therapist                   Obj/Interventions       Row Name 05/17/24 0817          Motor Skills    Functional Endurance Omnicycle x15 minutes  -     Therapeutic Exercise aerobic  -               User Key  (r) = Recorded By, (t) = Taken By, (c) = Cosigned By      Initials Name Provider Type     Sharmila Morelos, TIFFANIE Occupational Therapist                   Goals/Plan    No documentation.                  Clinical Impression       Row Name 05/17/24 0818          Plan of Care Review    Progress improving  -     Outcome Evaluation Pt. is spv to Mod Indep for most all BADLS using RW with improved balance, endurance and general strength.  Pt. plans to d/c home Tuesday with f/u HH.   available to assist  patient as needed.  Pt. is progressing toward all LTGs.  -       Row Name 05/17/24 0818          Therapy Plan Review/Discharge Plan (OT)    Anticipated Discharge Disposition (OT) home with home health  -               User Key  (r) = Recorded By, (t) = Taken By, (c) = Cosigned By      Initials Name Provider Type    Sharmila Meneses OT Occupational Therapist                   Outcome Measures       Row Name 05/17/24 0819          How much help from another is currently needed...    Putting on and taking off regular lower body clothing? 3  -GC     Bathing (including washing, rinsing, and drying) 4  -GC     Toileting (which includes using toilet bed pan or urinal) 4  -GC     Putting on and taking off regular upper body clothing 4  -GC     Taking care of personal grooming (such as brushing teeth) 4  -GC     Eating meals 4  -GC     AM-PAC 6 Clicks Score (OT) 23  -GC       Row Name 05/17/24 0819          Functional Assessment    Outcome Measure Options AM-PAC 6 Clicks Daily Activity (OT);Optimal Instrument  -       Row Name 05/17/24 0819          Optimal Instrument    Optimal Instrument Optimal - 3  -GC     Bending/Stooping 2  -GC     Standing 1  -GC     Reaching 1  -GC               User Key  (r) = Recorded By, (t) = Taken By, (c) = Cosigned By      Initials Name Provider Type    Sharmila Meneses OT Occupational Therapist                  Section G  Mobility  Dressing - self performance: limited assistance (staff provide guided maneuvering of limbs or other non-weight bearing assistance)  Dressing support/assistance: One person assist  Toileting - self performance: independent  Toileting support/assistance: No setup or physical help  Personal hygiene - self performance: supervision (oversight, encourage)  Personal hygiene support/assistance: Setup help only  Bathing  Bathing - self performance: Supervision  Bathing support/assistance: Setup only     Range of Motion  Upper Extremity: No impairment  Section  GG  SectionGG: Functional Ability/Goals, Adm  Self Care, Prior Functioning (RE0685D): 3. Independent  Functional Cognition, Prior Functioning (LY0268L): 3. Independent  Upper Extremity Range of Motion (CM8471G): No impairment  Self Care, Admission (Section GG)  Eating: Self-Care Admission Performance (FF2645B9): independent (06)  Oral Hygiene: Self-Care Admission Performance (JP0324Z7): setup or clean-up assistance (05)  Toileting Hygiene: Self-Care Admission Performance (QG5537F4): supervision or touching assistance (04)  Shower/Bathe Self: Self-Care Admission Performance (OX1409A8): supervision or touching assistance (04)  Upper Body Dressing: Self-Care Admission Performance (YP8380C7): setup or clean-up assistance (05)  Lower Body Dressing: Self-Care Admission Performance (MS5008O4): partial/moderate assistance (03)  Putting On/Taking Off Footwear: Self-Care Admission Performance (CA3075F9): partial/moderate assistance (03)  Personal Hygiene: Self-Care Admission Performance (XS3753A9): supervision or touching assistance (04)  Mobility, Admission Performance (SI8550)  Toilet Transfer: Mobility Admission Performance (GG3576X8): supervision or touching assistance (04)  Tub/shower Transfer: Mobility Admission Performance (WJ3408AA5): supervision or touching assistance (04)  Section GG: Functional Ability/Goals, DC  Eating: Self-Care Discharge Goal (XF8657Y3): independent (06)  Oral Hygiene: Self-Care Discharge Goal (XW4287N0): independent (06)  Toileting Hygiene: Self-Care Discharge Goal (SK7234Q7): independent (06)  Shower/Bathe Self: Self-Care Discharge Goal (QA7279F8): independent (06)  Upper Body Dressing: Self-Care Discharge Goal (MS2680J3): independent (06)  Lower Body Dressing: Self-Care Discharge Goal (CA6694T2): independent (06)  Putting On/Taking Off Footwear: Self-Care Discharge Goal (XM2128R7): independent (06)  Personal Hygiene: Self-Care Discharge Goal (LZ2978W9): independent (06)  Mobility (GG),  Discharge Goal (GM4243)  Toilet Transfer: Mobility Discharge Goal (LR4386C0): independent (06)  Tub/shower Transfer: Mobility Discharge Goal (YI1983LJ4): independent (06)          Occupational Therapy Education       Title: PT OT SLP Therapies (In Progress)       Topic: Occupational Therapy (In Progress)       Point: ADL training (Done)       Description:   Instruct learner(s) on proper safety adaptation and remediation techniques during self care or transfers.   Instruct in proper use of assistive devices.                  Learning Progress Summary             Patient Acceptance, E, VU,NR by  at 5/14/2024 1151                         Point: Home exercise program (Not Started)       Description:   Instruct learner(s) on appropriate technique for monitoring, assisting and/or progressing therapeutic exercises/activities.                  Learner Progress:  Not documented in this visit.              Point: Precautions (Done)       Description:   Instruct learner(s) on prescribed precautions during self-care and functional transfers.                  Learning Progress Summary             Patient Acceptance, E, VU,NR by  at 5/14/2024 1151                         Point: Body mechanics (Done)       Description:   Instruct learner(s) on proper positioning and spine alignment during self-care, functional mobility activities and/or exercises.                  Learning Progress Summary             Patient Acceptance, E, VU,NR by  at 5/14/2024 1151                                         User Key       Initials Effective Dates Name Provider Type Discipline     06/16/21 -  Sharmila Morelos OT Occupational Therapist OT                  OT Recommendation and Plan  Planned Therapy Interventions (OT): activity tolerance training, adaptive equipment training, BADL retraining, functional balance retraining, neuromuscular control/coordination retraining, occupation/activity based interventions, patient/caregiver  education/training, ROM/therapeutic exercise, strengthening exercise, transfer/mobility retraining  Therapy Frequency (OT): 5 times/wk  Plan of Care Review  Plan of Care Reviewed With: patient  Progress: improving  Outcome Evaluation: Pt. is spv to Mod Indep for most all BADLS using RW with improved balance, endurance and general strength.  Pt. plans to d/c home Tuesday with f/u HH.   available to assist patient as needed.  Pt. is progressing toward all LTGs.     Time Calculation:   Evaluation Complexity (OT)  Review Occupational Profile/Medical/Therapy History Complexity: expanded/moderate complexity  Assessment, Occupational Performance/Identification of Deficit Complexity: 3-5 performance deficits  Clinical Decision Making Complexity (OT): detailed assessment/moderate complexity  Overall Complexity of Evaluation (OT): moderate complexity     Time Calculation- OT       Row Name 05/17/24 0819             Time Calculation- OT    OT Received On 05/17/24  -GC         Timed Charges    12425 - OT Therapeutic Exercise Minutes 15  -GC      48087 - OT Therapeutic Activity Minutes 10  -GC      41284 - OT Self Care/Mgmt Minutes 30  -GC         SNF Occupational Therapy Minutes    Skilled Minutes- OT 55 min  -GC         Total Minutes    Timed Charges Total Minutes 55  -GC       Total Minutes 55  -GC                User Key  (r) = Recorded By, (t) = Taken By, (c) = Cosigned By      Initials Name Provider Type     Sharmila Morelos, OT Occupational Therapist                  Therapy Charges for Today       Code Description Service Date Service Provider Modifiers Qty    63908377378 HC OT THER PROC EA 15 MIN 5/16/2024 Sharmila Morelos, OT GO 3    26257185114 HC OT THER PROC EA 15 MIN 5/17/2024 Sharmila Morelos, OT GO 1    43530291455 HC OT THERAPEUTIC ACT EA 15 MIN 5/17/2024 Sharmila Morelos, OT GO 1    34053299031 HC OT SELF CARE/MGMT/TRAIN EA 15 MIN 5/17/2024 Sharmila Morelos, OT GO 2                 Sharmila Morelos OT  5/17/2024

## 2024-05-18 PROCEDURE — 97110 THERAPEUTIC EXERCISES: CPT

## 2024-05-18 PROCEDURE — 94799 UNLISTED PULMONARY SVC/PX: CPT

## 2024-05-18 PROCEDURE — 97116 GAIT TRAINING THERAPY: CPT

## 2024-05-18 RX ADMIN — SENNOSIDES AND DOCUSATE SODIUM 2 TABLET: 50; 8.6 TABLET ORAL at 09:08

## 2024-05-18 RX ADMIN — FERROUS SULFATE TAB 325 MG (65 MG ELEMENTAL FE) 325 MG: 325 (65 FE) TAB at 08:02

## 2024-05-18 RX ADMIN — DOCOSANOL 1 APPLICATION: 100 CREAM TOPICAL at 06:03

## 2024-05-18 RX ADMIN — ESCITALOPRAM OXALATE 10 MG: 10 TABLET ORAL at 09:08

## 2024-05-18 RX ADMIN — PRAVASTATIN SODIUM 40 MG: 20 TABLET ORAL at 21:08

## 2024-05-18 RX ADMIN — GABAPENTIN 300 MG: 300 CAPSULE ORAL at 09:08

## 2024-05-18 RX ADMIN — DOCOSANOL 1 APPLICATION: 100 CREAM TOPICAL at 21:12

## 2024-05-18 RX ADMIN — PANTOPRAZOLE SODIUM 40 MG: 40 TABLET, DELAYED RELEASE ORAL at 06:02

## 2024-05-18 RX ADMIN — OXYBUTYNIN CHLORIDE 10 MG: 5 TABLET, EXTENDED RELEASE ORAL at 09:07

## 2024-05-18 RX ADMIN — DOCOSANOL 1 APPLICATION: 100 CREAM TOPICAL at 14:57

## 2024-05-18 RX ADMIN — SENNOSIDES AND DOCUSATE SODIUM 2 TABLET: 50; 8.6 TABLET ORAL at 21:09

## 2024-05-18 RX ADMIN — DOCOSANOL 1 APPLICATION: 100 CREAM TOPICAL at 11:44

## 2024-05-18 RX ADMIN — GABAPENTIN 300 MG: 300 CAPSULE ORAL at 21:09

## 2024-05-18 RX ADMIN — ASPIRIN 325 MG: 325 TABLET ORAL at 21:08

## 2024-05-18 RX ADMIN — POLYETHYLENE GLYCOL 3350 17 G: 17 POWDER, FOR SOLUTION ORAL at 09:07

## 2024-05-18 RX ADMIN — Medication 600 MG: at 09:00

## 2024-05-18 RX ADMIN — DOCOSANOL 1 APPLICATION: 100 CREAM TOPICAL at 18:06

## 2024-05-18 RX ADMIN — ACETAMINOPHEN 650 MG: 325 TABLET ORAL at 21:09

## 2024-05-18 RX ADMIN — ASPIRIN 325 MG: 325 TABLET ORAL at 09:08

## 2024-05-18 RX ADMIN — Medication 600 MG: at 18:06

## 2024-05-18 RX ADMIN — MONTELUKAST 10 MG: 10 TABLET, FILM COATED ORAL at 21:08

## 2024-05-18 NOTE — THERAPY TREATMENT NOTE
SNF - Physical Therapy Progress Note   [facility]     Patient Name: Laura Cardenas  : 1940  MRN: [id number]  Today's Date: 2024      Visit Dx:    ICD-10-CM ICD-9-CM   1. Decreased activities of daily living (ADL)  Z78.9 V49.89   2. Difficulty walking  R26.2 719.7     Patient Active Problem List   Diagnosis    Gastro-esophageal reflux disease without esophagitis    Mixed hyperlipidemia    Impaired fasting glucose    Vitamin D deficiency    Primary osteoarthritis involving multiple joints    Age-related osteoporosis without current pathological fracture    Suprapubic fullness    Medicare annual wellness visit, subsequent    Mixed stress and urge urinary incontinence    Non-seasonal allergic rhinitis due to pollen    History of DVT of lower extremity    Left lumbar radiculopathy    Hip fx, left, closed, initial encounter    Closed fracture of left hip    Physical debility     Past Medical History:   Diagnosis Date    Acute embolism and thrombosis of deep vein of left lower extremity     Age-related osteoporosis without current pathological fracture     Disorder of bone, unspecified     Diverticulosis     DJD (degenerative joint disease)     GERD without esophagitis     History of transfusion     59 years ago for childbirth    Mixed hyperlipidemia     Primary generalized (osteo)arthritis     Spinal headache      Past Surgical History:   Procedure Laterality Date    BACK SURGERY      BACK SURGERY      S/P Lumbar    BREAST BIOPSY      Breast Bx (81 & 98)    CATARACT EXTRACTION      2015 L Cataract 2015 R Cataract    COLONOSCOPY      DR. AWAD    COLONOSCOPY N/A 2021    Procedure: COLONOSCOPY, WITH HOT SNARE POLYPECTOMY,;  Surgeon: Tony Thomas MD;  Location: [facility] ENDOSCOPY;  Service: Gastroenterology;  Laterality: N/A;  DIVERTICULOSIS, COLON POLYP    REPLACEMENT TOTAL KNEE Bilateral     04/10/2019 right knee replacement 2020 knee replacement left    TONSILLECTOMY       10 YEARS OLD    TOTAL HIP ARTHROPLASTY Left 5/10/2024    Procedure: TOTAL HIP ARTHROPLASTY;  Surgeon: Ivan Cruz MD;  Location: [facility] MAIN OR;  Service: Orthopedics;  Laterality: Left;       PT Assessment (Last 12 Hours)       PT Evaluation and Treatment       Row Name 05/18/24 1600          Physical Therapy Time and Intention    Subjective Information no complaints  -CS     Document Type therapy note (daily note)  -CS     Mode of Treatment individual therapy;physical therapy  -CS     Patient Effort good  -CS     Symptoms Noted During/After Treatment none  -CS       Row Name 05/18/24 1600          Pain    Pretreatment Pain Rating 0/10 - no pain  -CS     Posttreatment Pain Rating 0/10 - no pain  -CS       Row Name 05/18/24 1600          Bed Mobility    Supine-Sit [place] (Bed Mobility) standby assist  -CS     Sit-Supine [place] (Bed Mobility) standby assist  -CS     Assistive Device (Bed Mobility) bed rails  -CS       Row Name 05/18/24 1600          Sit-Stand Transfer    Sit-Stand [place] (Transfers) standby assist  -CS     Assistive Device (Sit-Stand Transfers) walker, front-wheeled  -CS       Row Name 05/18/24 1600          Stand-Sit Transfer    Stand-Sit [place] (Transfers) standby assist  -CS     Assistive Device (Stand-Sit Transfers) walker, front-wheeled  -CS       Row Name 05/18/24 1600          Gait/Stairs (Locomotion)    [place] Level (Gait) standby assist  -CS     Assistive Device (Gait) walker, front-wheeled  -CS     Distance in Feet (Gait) 300  x 2 reps  -CS     Pattern (Gait) 4-point;step-through  -CS     Deviations/Abnormal Patterns (Gait) gait speed decreased  -CS     Bilateral Gait Deviations forward flexed posture  -CS       Row Name 05/18/24 1600          Aerobic Exercise    Time Performed (Aerobic Exercise) 20:13  -CS     Comment, Aerobic Exercise (Therapeutic Exercise) Nu-Step x 1,395 steps at 69 SPM on load 5  -CS       Row Name             Wound 05/10/24 1832 Left  anterior greater trochanter Incision    Wound - Properties Group Placement Date: 05/10/24  -HK Placement Time: 1832 -HK Side: Left  -HK Orientation: anterior  -HK Location: greater trochanter  -HK Primary Wound Type: Incision  -HK    Retired Wound - Properties Group Placement Date: 05/10/24  -HK Placement Time: 1832 -HK Side: Left  -HK Orientation: anterior  -HK Location: greater trochanter  -HK Primary Wound Type: Incision  -HK    Retired Wound - Properties Group Date first assessed: 05/10/24  -HK Time first assessed: 1832 -HK Side: Left  -HK Location: greater trochanter  -HK Primary Wound Type: Incision  -HK      Row Name 05/18/24 1600          Positioning and Restraints    Pre-Treatment Position in bed  -CS     Post Treatment Position bed  -CS     In Bed fowlers;call light within reach;encouraged to call for assist;with family/caregiver  no alarms active upon entering room  -CS       Row Name 05/18/24 0505          Therapy Assessment/Plan (PT)    Therapy Frequency (PT) 6 times/wk  -KEIRY       Row Name 05/18/24 1600          Progress Summary (PT)    Progress Toward Functional Goals (PT) progress toward functional goals is good  -CS               User Key  (r) = Recorded By, (t) = Taken By, (c) = Cosigned By      Initials Name Provider Type    Starr Frost, RN Registered Nurse    hCamp Lewis, PT Physical Therapist    Vinnie Alvarado PTA Physical Therapist Assistant                  Section G              Section GG                       Physical Therapy Education       Title: PT OT SLP Therapies (In Progress)       Topic: Physical Therapy (Done)       Point: Mobility training (Done)       Learning Progress Summary             Patient Acceptance, E,TB, VU by AV at 5/14/2024 1335                         Point: Home exercise program (Done)       Learning Progress Summary             Patient Acceptance, E,TB, VU by AV at 5/14/2024 1335                         Point: Body mechanics (Done)       Learning  Progress Summary             Patient Acceptance, E,TB, VU by AV at 5/14/2024 1335                         Point: Precautions (Done)       Learning Progress Summary             Patient Acceptance, E,TB, VU by AV at 5/14/2024 1335                                         User Key       Initials Effective Dates Name Provider Type Discipline     06/11/21 -  Shimon Navarro, PT Physical Therapist PT                  PT Recommendation and Plan     Progress Summary (PT)  Progress Toward Functional Goals (PT): progress toward functional goals is good   Outcome Measures       Row Name 05/18/24 1600 05/17/24 1150 05/16/24 1212       How much help from another person do you currently need...    Turning from your back to your side while in flat bed without using bedrails? 4  -CS 4  -WM 4  -WM    Moving from lying on back to sitting on the side of a flat bed without bedrails? 4  -CS 4  -WM 3  -WM    Moving to and from a bed to a chair (including a wheelchair)? 3  -CS 3  -WM 3  -WM    Standing up from a chair using your arms (e.g., wheelchair, bedside chair)? 4  -CS 3  -WM 3  -WM    Climbing 3-5 steps with a railing? 3  -CS 3  -WM 3  -WM    To walk in hospital room? 3  -CS 3  -WM 3  -WM    AM-PAC 6 Clicks Score (PT) 21  -CS 20  -WM 19  -WM    Highest Level of Mobility Goal 6 --> Walk 10 steps or more  -CS 6 --> Walk 10 steps or more  -WM 6 --> Walk 10 steps or more  -WM       Functional Assessment    Outcome Measure Options AM-PAC 6 Clicks Basic Mobility (PT)  -CS -- --              User Key  (r) = Recorded By, (t) = Taken By, (c) = Cosigned By      Initials Name Provider Type    WM Michael Wong, SHERICE Physical Therapist Assistant    Vinnie Alvarado PTA Physical Therapist Assistant                     Time Calculation:    PT Charges       Row Name 05/18/24 1650             Time Calculation    Start Time 1304  -CS      PT Received On 05/18/24  -CS         Timed Charges    05183 - PT Therapeutic Exercise Minutes 21  -CS       17995 - Gait Training Minutes  18  -CS      25013 - PT Therapeutic Activity Minutes 3  -CS         SNF Physical Therapy Minutes    Skilled Minutes- PT 42 min  -CS         Total Minutes    Timed Charges Total Minutes 42  -CS       Total Minutes 42  -CS                User Key  (r) = Recorded By, (t) = Taken By, (c) = Cosigned By      Initials Name Provider Type    CS Vinnie Koo PTA Physical Therapist Assistant                  Therapy Charges for Today       Code Description Service Date Service Provider Modifiers Qty    05825859129 HC PT THER PROC EA 15 MIN 5/18/2024 Vinnie Koo PTA GP 2    71692527128 HC GAIT TRAINING EA 15 MIN 5/18/2024 Vinnie Koo PTA GP 1            PT G-Codes  Outcome Measure Options: AM-PAC 6 Clicks Basic Mobility (PT)  AM-PAC 6 Clicks Score (PT): 21  AM-PAC 6 Clicks Score (OT): 23    Vinnie Koo PTA  5/18/2024

## 2024-05-18 NOTE — PLAN OF CARE
Goal Outcome Evaluation:           Progress: improving  Outcome Evaluation: Patient is alert and oriented x4. Pleasant with staff. Standby assist with walker to bathroom. Denies pain. Is scheduled to discharge Tuesday. Voices needs. Con't current POC.

## 2024-05-18 NOTE — PLAN OF CARE
Goal Outcome Evaluation:  Plan of Care Reviewed With: patient        Progress: improving  Outcome Evaluation: Alert and oriented X 4. Vital signs stable. Able to communicate needs. Ambulates to bathroom with rolling walker and one person assistance. Oxycodone administered at  for headache. Continue plan of care.

## 2024-05-19 LAB
DEPRECATED RDW RBC AUTO: 46.4 FL (ref 37–54)
ERYTHROCYTE [DISTWIDTH] IN BLOOD BY AUTOMATED COUNT: 14.1 % (ref 12.3–15.4)
HCT VFR BLD AUTO: 22.2 % (ref 34–46.6)
HGB BLD-MCNC: 7.2 G/DL (ref 12–15.9)
MCH RBC QN AUTO: 30.9 PG (ref 26.6–33)
MCHC RBC AUTO-ENTMCNC: 32.4 G/DL (ref 31.5–35.7)
MCV RBC AUTO: 95.3 FL (ref 79–97)
PLATELET # BLD AUTO: 235 10*3/MM3 (ref 140–450)
PMV BLD AUTO: 9.4 FL (ref 6–12)
RBC # BLD AUTO: 2.33 10*6/MM3 (ref 3.77–5.28)
WBC NRBC COR # BLD AUTO: 10.78 10*3/MM3 (ref 3.4–10.8)

## 2024-05-19 PROCEDURE — 94799 UNLISTED PULMONARY SVC/PX: CPT

## 2024-05-19 PROCEDURE — 97110 THERAPEUTIC EXERCISES: CPT

## 2024-05-19 PROCEDURE — 97116 GAIT TRAINING THERAPY: CPT

## 2024-05-19 PROCEDURE — 85027 COMPLETE CBC AUTOMATED: CPT | Performed by: PHYSICIAN ASSISTANT

## 2024-05-19 RX ADMIN — FERROUS SULFATE TAB 325 MG (65 MG ELEMENTAL FE) 325 MG: 325 (65 FE) TAB at 08:14

## 2024-05-19 RX ADMIN — DOCOSANOL 1 APPLICATION: 100 CREAM TOPICAL at 17:17

## 2024-05-19 RX ADMIN — DOCOSANOL 1 APPLICATION: 100 CREAM TOPICAL at 10:50

## 2024-05-19 RX ADMIN — ASPIRIN 325 MG: 325 TABLET ORAL at 08:13

## 2024-05-19 RX ADMIN — ACETAMINOPHEN 650 MG: 325 TABLET ORAL at 17:53

## 2024-05-19 RX ADMIN — PANTOPRAZOLE SODIUM 40 MG: 40 TABLET, DELAYED RELEASE ORAL at 05:58

## 2024-05-19 RX ADMIN — DOCOSANOL 1 APPLICATION: 100 CREAM TOPICAL at 06:00

## 2024-05-19 RX ADMIN — OXYBUTYNIN CHLORIDE 10 MG: 5 TABLET, EXTENDED RELEASE ORAL at 08:14

## 2024-05-19 RX ADMIN — GABAPENTIN 300 MG: 300 CAPSULE ORAL at 08:14

## 2024-05-19 RX ADMIN — Medication 600 MG: at 08:13

## 2024-05-19 RX ADMIN — MONTELUKAST 10 MG: 10 TABLET, FILM COATED ORAL at 21:07

## 2024-05-19 RX ADMIN — PRAVASTATIN SODIUM 40 MG: 20 TABLET ORAL at 21:06

## 2024-05-19 RX ADMIN — ASPIRIN 325 MG: 325 TABLET ORAL at 21:06

## 2024-05-19 RX ADMIN — ESCITALOPRAM OXALATE 10 MG: 10 TABLET ORAL at 08:13

## 2024-05-19 RX ADMIN — POLYETHYLENE GLYCOL 3350 17 G: 17 POWDER, FOR SOLUTION ORAL at 08:14

## 2024-05-19 RX ADMIN — Medication 600 MG: at 17:17

## 2024-05-19 RX ADMIN — DOCOSANOL 1 APPLICATION: 100 CREAM TOPICAL at 14:29

## 2024-05-19 RX ADMIN — GABAPENTIN 300 MG: 300 CAPSULE ORAL at 21:06

## 2024-05-19 RX ADMIN — DOCOSANOL 1 APPLICATION: 100 CREAM TOPICAL at 21:10

## 2024-05-19 RX ADMIN — SENNOSIDES AND DOCUSATE SODIUM 2 TABLET: 50; 8.6 TABLET ORAL at 08:13

## 2024-05-19 NOTE — PLAN OF CARE
Goal Outcome Evaluation:           Progress: improving  Outcome Evaluation: Rsd. is alert and oriented, able to make needs known to staff.  Rsd. transfers x1 assist to bathroom with rolling walker and gaitbelt.  Medicated x1 this shift with prn Tylenol, low grade temp of 99.5 this evening and states she just doesnt feel well this evening.  Will continue to monitor and notify on-coming shift. Call light and personal items within reach, Rsd. reminded to use call light for assistance, verbalizes understanding.  Will continue to monitor and notify on-coming staff.  HGB still remains at 7.2 - Freedom Hardy aware, labs ordered for am.  Surgical aquacel ag dressing in place and due to be changed tomorrow per orders.  Current plan of care remains in place at this time.

## 2024-05-19 NOTE — PLAN OF CARE
Goal Outcome Evaluation:  Plan of Care Reviewed With: patient        Progress: improving  Outcome Evaluation: Alert and oriented. Vital signs stable. Ambulates to bathroom with rolling walker and one person assistance. Tylenol administered at HS for left hip surgical pain. Declined snack Continue plan of care.

## 2024-05-19 NOTE — THERAPY TREATMENT NOTE
SNF - Physical Therapy Progress Note   [facility]     Patient Name: Laura Cardenas  : 1940  MRN: [id number]  Today's Date: 2024      Visit Dx:    ICD-10-CM ICD-9-CM   1. Decreased activities of daily living (ADL)  Z78.9 V49.89   2. Difficulty walking  R26.2 719.7     Patient Active Problem List   Diagnosis    Gastro-esophageal reflux disease without esophagitis    Mixed hyperlipidemia    Impaired fasting glucose    Vitamin D deficiency    Primary osteoarthritis involving multiple joints    Age-related osteoporosis without current pathological fracture    Suprapubic fullness    Medicare annual wellness visit, subsequent    Mixed stress and urge urinary incontinence    Non-seasonal allergic rhinitis due to pollen    History of DVT of lower extremity    Left lumbar radiculopathy    Hip fx, left, closed, initial encounter    Closed fracture of left hip    Physical debility     Past Medical History:   Diagnosis Date    Acute embolism and thrombosis of deep vein of left lower extremity     Age-related osteoporosis without current pathological fracture     Disorder of bone, unspecified     Diverticulosis     DJD (degenerative joint disease)     GERD without esophagitis     History of transfusion     61 years ago for childbirth    Mixed hyperlipidemia     Primary generalized (osteo)arthritis     Spinal headache      Past Surgical History:   Procedure Laterality Date    BACK SURGERY      BACK SURGERY      S/P Lumbar    BREAST BIOPSY      Breast Bx (81 & 98)    CATARACT EXTRACTION      2015 L Cataract 2015 R Cataract    COLONOSCOPY      DR. AWAD    COLONOSCOPY N/A 2021    Procedure: COLONOSCOPY, WITH HOT SNARE POLYPECTOMY,;  Surgeon: Tony Thomas MD;  Location: [facility] ENDOSCOPY;  Service: Gastroenterology;  Laterality: N/A;  DIVERTICULOSIS, COLON POLYP    REPLACEMENT TOTAL KNEE Bilateral     04/10/2019 right knee replacement 2020 knee replacement left    TONSILLECTOMY       10 YEARS OLD    TOTAL HIP ARTHROPLASTY Left 5/10/2024    Procedure: TOTAL HIP ARTHROPLASTY;  Surgeon: Ivan Cruz MD;  Location: [facility] MAIN OR;  Service: Orthopedics;  Laterality: Left;       PT Assessment (Last 12 Hours)       PT Evaluation and Treatment       Row Name 05/19/24 1600          Physical Therapy Time and Intention    Subjective Information no complaints  -CS     Document Type therapy note (daily note)  -CS     Mode of Treatment individual therapy;physical therapy  -CS     Patient Effort good  -CS     Symptoms Noted During/After Treatment none  -CS       Row Name 05/19/24 1600          Pain    Pretreatment Pain Rating 0/10 - no pain  -CS     Posttreatment Pain Rating 0/10 - no pain  -CS       Row Name 05/19/24 1600          Sit-Stand Transfer    Sit-Stand [place] (Transfers) standby assist  -CS     Assistive Device (Sit-Stand Transfers) walker, front-wheeled  -CS       Row Name 05/19/24 1600          Stand-Sit Transfer    Stand-Sit [place] (Transfers) standby assist  -CS     Assistive Device (Stand-Sit Transfers) walker, front-wheeled  -CS       Row Name 05/19/24 1600          Gait/Stairs (Locomotion)    [place] Level (Gait) standby assist  -CS     Assistive Device (Gait) walker, front-wheeled  -CS     Distance in Feet (Gait) 300  x 2 reps  -CS     Pattern (Gait) 4-point;step-through  -CS     Bilateral Gait Deviations forward flexed posture  -CS       Row Name 05/19/24 1600          Aerobic Exercise    Time Performed (Aerobic Exercise) 20:07  -CS     Comment, Aerobic Exercise (Therapeutic Exercise) Nu-Step x 1,685 steps at 84 SPM on load 6  -CS       Row Name             Wound 05/10/24 1832 Left anterior greater trochanter Incision    Wound - Properties Group Placement Date: 05/10/24  -HK Placement Time: 1832  -HK Side: Left  -HK Orientation: anterior  -HK Location: greater trochanter  -HK Primary Wound Type: Incision  -HK    Retired Wound - Properties Group Placement Date: 05/10/24   -HK Placement Time: 1832 -HK Side: Left  -HK Orientation: anterior  -HK Location: greater trochanter  -HK Primary Wound Type: Incision  -HK    Retired Wound - Properties Group Date first assessed: 05/10/24  -HK Time first assessed: 1832  -HK Side: Left  -HK Location: greater trochanter  -HK Primary Wound Type: Incision  -HK      Row Name 05/19/24 1600          Positioning and Restraints    Pre-Treatment Position sitting in chair/recliner  -CS     Post Treatment Position bed  -CS     In Bed fowlers;call light within reach;encouraged to call for assist;with family/caregiver  no alarms active upon entering room  -CS       Row Name 05/19/24 1600          Progress Summary (PT)    Progress Toward Functional Goals (PT) progress toward functional goals is good  -CS               User Key  (r) = Recorded By, (t) = Taken By, (c) = Cosigned By      Initials Name Provider Type    HK Starr Carranza, RN Registered Nurse    Vinnie Alvarado PTA Physical Therapist Assistant                  Section G              Section GG                       Physical Therapy Education       Title: PT OT SLP Therapies (In Progress)       Topic: Physical Therapy (Done)       Point: Mobility training (Done)       Learning Progress Summary             Patient Acceptance, E,TB, VU by AV at 5/14/2024 1335                         Point: Home exercise program (Done)       Learning Progress Summary             Patient Acceptance, E,TB, VU by AV at 5/14/2024 1335                         Point: Body mechanics (Done)       Learning Progress Summary             Patient Acceptance, E,TB, VU by AV at 5/14/2024 1335                         Point: Precautions (Done)       Learning Progress Summary             Patient Acceptance, E,TB, VU by AV at 5/14/2024 1335                                         User Key       Initials Effective Dates Name Provider Type Discipline    AV 06/11/21 -  Shimon Navarro, PT Physical Therapist PT                  PT  Recommendation and Plan     Progress Summary (PT)  Progress Toward Functional Goals (PT): progress toward functional goals is good   Outcome Measures       Row Name 05/19/24 1600 05/18/24 1600 05/17/24 1150       How much help from another person do you currently need...    Turning from your back to your side while in flat bed without using bedrails? 4  -CS 4  -CS 4  -WM    Moving from lying on back to sitting on the side of a flat bed without bedrails? 4  -CS 4  -CS 4  -WM    Moving to and from a bed to a chair (including a wheelchair)? 3  -CS 3  -CS 3  -WM    Standing up from a chair using your arms (e.g., wheelchair, bedside chair)? 4  -CS 4  -CS 3  -WM    Climbing 3-5 steps with a railing? 3  -CS 3  -CS 3  -WM    To walk in hospital room? 4  -CS 3  -CS 3  -WM    AM-PAC 6 Clicks Score (PT) 22  -CS 21  -CS 20  -WM    Highest Level of Mobility Goal 7 --> Walk 25 feet or more  -CS 6 --> Walk 10 steps or more  -CS 6 --> Walk 10 steps or more  -WM       Functional Assessment    Outcome Measure Options AM-PAC 6 Clicks Basic Mobility (PT)  -CS AM-PAC 6 Clicks Basic Mobility (PT)  -CS --              User Key  (r) = Recorded By, (t) = Taken By, (c) = Cosigned By      Initials Name Provider Type     Michael Wong PTA Physical Therapist Assistant    Vinnie Alvarado PTA Physical Therapist Assistant                     Time Calculation:    PT Charges       Row Name 05/19/24 1620             Time Calculation    Start Time 1356  -CS      PT Received On 05/19/24  -CS         Timed Charges    93012 - PT Therapeutic Exercise Minutes 20  -CS      41368 - Gait Training Minutes  10  -CS         SNF Physical Therapy Minutes    Skilled Minutes- PT 30 min  -CS         Total Minutes    Timed Charges Total Minutes 30  -CS       Total Minutes 30  -CS                User Key  (r) = Recorded By, (t) = Taken By, (c) = Cosigned By      Initials Name Provider Type    CS Vinnie Koo PTA Physical Therapist Assistant                   Therapy Charges for Today       Code Description Service Date Service Provider Modifiers Qty    02339187198 HC PT THER PROC EA 15 MIN 5/18/2024 Vinnie Koo, SHERICE GP 2    32448819961 HC GAIT TRAINING EA 15 MIN 5/18/2024 Vinnie Koo, SHERICE GP 1    44723234164 HC PT THER PROC EA 15 MIN 5/19/2024 Vinnie Koo, SHERICE GP 1    56764295777 HC GAIT TRAINING EA 15 MIN 5/19/2024 Vinnie Koo PTA GP 1            PT G-Codes  Outcome Measure Options: AM-PAC 6 Clicks Basic Mobility (PT)  AM-PAC 6 Clicks Score (PT): 22  AM-PAC 6 Clicks Score (OT): 23    Vinnie Koo PTA  5/19/2024

## 2024-05-20 VITALS
TEMPERATURE: 100.6 F | BODY MASS INDEX: 30.1 KG/M2 | HEART RATE: 91 BPM | WEIGHT: 163.58 LBS | SYSTOLIC BLOOD PRESSURE: 122 MMHG | RESPIRATION RATE: 18 BRPM | OXYGEN SATURATION: 91 % | DIASTOLIC BLOOD PRESSURE: 52 MMHG | HEIGHT: 62 IN

## 2024-05-20 LAB
ANION GAP SERPL CALCULATED.3IONS-SCNC: 9.3 MMOL/L (ref 5–15)
BASOPHILS # BLD AUTO: 0.04 10*3/MM3 (ref 0–0.2)
BASOPHILS NFR BLD AUTO: 0.4 % (ref 0–1.5)
BUN SERPL-MCNC: 14 MG/DL (ref 8–23)
BUN/CREAT SERPL: 23.3 (ref 7–25)
CALCIUM SPEC-SCNC: 8.9 MG/DL (ref 8.6–10.5)
CHLORIDE SERPL-SCNC: 108 MMOL/L (ref 98–107)
CO2 SERPL-SCNC: 25.7 MMOL/L (ref 22–29)
CREAT SERPL-MCNC: 0.6 MG/DL (ref 0.57–1)
DEPRECATED RDW RBC AUTO: 49.1 FL (ref 37–54)
EGFRCR SERPLBLD CKD-EPI 2021: 90.3 ML/MIN/1.73
EOSINOPHIL # BLD AUTO: 0.25 10*3/MM3 (ref 0–0.4)
EOSINOPHIL NFR BLD AUTO: 2.2 % (ref 0.3–6.2)
ERYTHROCYTE [DISTWIDTH] IN BLOOD BY AUTOMATED COUNT: 14.7 % (ref 12.3–15.4)
GLUCOSE SERPL-MCNC: 104 MG/DL (ref 65–99)
HCT VFR BLD AUTO: 23.3 % (ref 34–46.6)
HGB BLD-MCNC: 7.4 G/DL (ref 12–15.9)
IMM GRANULOCYTES # BLD AUTO: 0.11 10*3/MM3 (ref 0–0.05)
IMM GRANULOCYTES NFR BLD AUTO: 1 % (ref 0–0.5)
LYMPHOCYTES # BLD AUTO: 2.38 10*3/MM3 (ref 0.7–3.1)
LYMPHOCYTES NFR BLD AUTO: 21.3 % (ref 19.6–45.3)
MAGNESIUM SERPL-MCNC: 2 MG/DL (ref 1.6–2.4)
MCH RBC QN AUTO: 30.7 PG (ref 26.6–33)
MCHC RBC AUTO-ENTMCNC: 31.8 G/DL (ref 31.5–35.7)
MCV RBC AUTO: 96.7 FL (ref 79–97)
MONOCYTES # BLD AUTO: 0.94 10*3/MM3 (ref 0.1–0.9)
MONOCYTES NFR BLD AUTO: 8.4 % (ref 5–12)
NEUTROPHILS NFR BLD AUTO: 66.7 % (ref 42.7–76)
NEUTROPHILS NFR BLD AUTO: 7.43 10*3/MM3 (ref 1.7–7)
NRBC BLD AUTO-RTO: 0.5 /100 WBC (ref 0–0.2)
PLATELET # BLD AUTO: 283 10*3/MM3 (ref 140–450)
PMV BLD AUTO: 9.4 FL (ref 6–12)
POTASSIUM SERPL-SCNC: 3.8 MMOL/L (ref 3.5–5.2)
RBC # BLD AUTO: 2.41 10*6/MM3 (ref 3.77–5.28)
SODIUM SERPL-SCNC: 143 MMOL/L (ref 136–145)
WBC NRBC COR # BLD AUTO: 11.15 10*3/MM3 (ref 3.4–10.8)

## 2024-05-20 PROCEDURE — 85025 COMPLETE CBC W/AUTO DIFF WBC: CPT | Performed by: PHYSICIAN ASSISTANT

## 2024-05-20 PROCEDURE — 83735 ASSAY OF MAGNESIUM: CPT | Performed by: PHYSICIAN ASSISTANT

## 2024-05-20 PROCEDURE — 94799 UNLISTED PULMONARY SVC/PX: CPT

## 2024-05-20 PROCEDURE — 97535 SELF CARE MNGMENT TRAINING: CPT

## 2024-05-20 PROCEDURE — 99308 SBSQ NF CARE LOW MDM 20: CPT | Performed by: PHYSICIAN ASSISTANT

## 2024-05-20 PROCEDURE — 97110 THERAPEUTIC EXERCISES: CPT

## 2024-05-20 PROCEDURE — 97116 GAIT TRAINING THERAPY: CPT

## 2024-05-20 PROCEDURE — 63710000001 ONDANSETRON ODT 4 MG TABLET DISPERSIBLE: Performed by: FAMILY MEDICINE

## 2024-05-20 PROCEDURE — 80048 BASIC METABOLIC PNL TOTAL CA: CPT | Performed by: PHYSICIAN ASSISTANT

## 2024-05-20 PROCEDURE — 97530 THERAPEUTIC ACTIVITIES: CPT

## 2024-05-20 RX ORDER — ACETAMINOPHEN 500 MG
500 TABLET ORAL EVERY 6 HOURS PRN
Qty: 30 TABLET | Refills: 0 | Status: SHIPPED | OUTPATIENT
Start: 2024-05-20

## 2024-05-20 RX ORDER — ASPIRIN 325 MG
325 TABLET, DELAYED RELEASE (ENTERIC COATED) ORAL 2 TIMES DAILY
Qty: 46 TABLET | Refills: 0 | Status: SHIPPED | OUTPATIENT
Start: 2024-05-22 | End: 2024-06-14

## 2024-05-20 RX ORDER — FERROUS SULFATE 325(65) MG
325 TABLET ORAL
Qty: 60 TABLET | Refills: 0 | Status: SHIPPED | OUTPATIENT
Start: 2024-05-20

## 2024-05-20 RX ADMIN — GABAPENTIN 300 MG: 300 CAPSULE ORAL at 08:59

## 2024-05-20 RX ADMIN — DOCOSANOL 1 APPLICATION: 100 CREAM TOPICAL at 06:00

## 2024-05-20 RX ADMIN — ACETAMINOPHEN 650 MG: 325 TABLET ORAL at 21:22

## 2024-05-20 RX ADMIN — ONDANSETRON 4 MG: 4 TABLET, ORALLY DISINTEGRATING ORAL at 08:59

## 2024-05-20 RX ADMIN — DOCOSANOL 1 APPLICATION: 100 CREAM TOPICAL at 21:24

## 2024-05-20 RX ADMIN — Medication 600 MG: at 08:02

## 2024-05-20 RX ADMIN — FERROUS SULFATE TAB 325 MG (65 MG ELEMENTAL FE) 325 MG: 325 (65 FE) TAB at 08:02

## 2024-05-20 RX ADMIN — Medication 600 MG: at 17:29

## 2024-05-20 RX ADMIN — ASPIRIN 325 MG: 325 TABLET ORAL at 21:20

## 2024-05-20 RX ADMIN — SENNOSIDES AND DOCUSATE SODIUM 2 TABLET: 50; 8.6 TABLET ORAL at 21:20

## 2024-05-20 RX ADMIN — PANTOPRAZOLE SODIUM 40 MG: 40 TABLET, DELAYED RELEASE ORAL at 06:00

## 2024-05-20 RX ADMIN — MONTELUKAST 10 MG: 10 TABLET, FILM COATED ORAL at 21:20

## 2024-05-20 RX ADMIN — ASPIRIN 325 MG: 325 TABLET ORAL at 08:59

## 2024-05-20 RX ADMIN — PRAVASTATIN SODIUM 40 MG: 20 TABLET ORAL at 21:20

## 2024-05-20 RX ADMIN — OXYBUTYNIN CHLORIDE 10 MG: 5 TABLET, EXTENDED RELEASE ORAL at 08:59

## 2024-05-20 RX ADMIN — GABAPENTIN 300 MG: 300 CAPSULE ORAL at 21:20

## 2024-05-20 RX ADMIN — ESCITALOPRAM OXALATE 10 MG: 10 TABLET ORAL at 08:59

## 2024-05-20 NOTE — PLAN OF CARE
Goal Outcome Evaluation:   Alert and oriented and pleasant with staff. Currently Independent in room for transfers and ambulation. No c/o pain requiring PRN pain medication. X1 admin anti nausea medication, with relief of symptoms noted. Surgical dressing changed to dry dressing this shift. Sitting up in bed, family at bedside call light in reach.

## 2024-05-20 NOTE — THERAPY DISCHARGE NOTE
SNF - Physical Therapy Treatment Note/Discharge  MARCELINO Canela     Patient Name: Laura Cardenas  : 1940  MRN: [id number]  Today's Date: 2024                Admit Date: 2024    Visit Dx:    ICD-10-CM ICD-9-CM   1. Decreased activities of daily living (ADL)  Z78.9 V49.89   2. Difficulty walking  R26.2 719.7     Patient Active Problem List   Diagnosis    Gastro-esophageal reflux disease without esophagitis    Mixed hyperlipidemia    Impaired fasting glucose    Vitamin D deficiency    Primary osteoarthritis involving multiple joints    Age-related osteoporosis without current pathological fracture    Suprapubic fullness    Medicare annual wellness visit, subsequent    Mixed stress and urge urinary incontinence    Non-seasonal allergic rhinitis due to pollen    History of DVT of lower extremity    Left lumbar radiculopathy    Hip fx, left, closed, initial encounter    Closed fracture of left hip    Physical debility     Past Medical History:   Diagnosis Date    Acute embolism and thrombosis of deep vein of left lower extremity     Age-related osteoporosis without current pathological fracture     Disorder of bone, unspecified     Diverticulosis     DJD (degenerative joint disease)     GERD without esophagitis     History of transfusion     59 years ago for childbirth    Mixed hyperlipidemia     Primary generalized (osteo)arthritis     Spinal headache      Past Surgical History:   Procedure Laterality Date    BACK SURGERY      BACK SURGERY      S/P Lumbar    BREAST BIOPSY      Breast Bx (81 & 98)    CATARACT EXTRACTION      2015 L Cataract 2015 R Cataract    COLONOSCOPY      DR. AWAD    COLONOSCOPY N/A 2021    Procedure: COLONOSCOPY, WITH HOT SNARE POLYPECTOMY,;  Surgeon: Tony Thomas MD;  Location: [facility] ENDOSCOPY;  Service: Gastroenterology;  Laterality: N/A;  DIVERTICULOSIS, COLON POLYP    REPLACEMENT TOTAL KNEE Bilateral     04/10/2019 right knee replacement  01/2020 knee replacement left    TONSILLECTOMY      10 YEARS OLD    TOTAL HIP ARTHROPLASTY Left 5/10/2024    Procedure: TOTAL HIP ARTHROPLASTY;  Surgeon: Ivan Cruz MD;  Location: [facility] MAIN OR;  Service: Orthopedics;  Laterality: Left;       PT Assessment (Last 12 Hours)       PT Evaluation and Treatment       Row Name 05/20/24 1504          Physical Therapy Time and Intention    Document Type discharge treatment  -WM     Mode of Treatment individual therapy;physical therapy  -WM     Patient Effort good  -WM     Symptoms Noted During/After Treatment fatigue  -WM       Row Name 05/20/24 1504          Bed Mobility    Supine-Sit [place] (Bed Mobility) independent  -WM     Sit-Supine [place] (Bed Mobility) independent  -WM     Assistive Device (Bed Mobility) --  -       Row Name 05/20/24 1504          Sit-Stand Transfer    Sit-Stand [place] (Transfers) modified independence  -     Assistive Device (Sit-Stand Transfers) walker, front-wheeled  -WM       Row Name 05/20/24 1504          Stand-Sit Transfer    Stand-Sit [place] (Transfers) modified independence  -     Assistive Device (Stand-Sit Transfers) walker, front-wheeled  -WM       Row Name 05/20/24 1504          Toilet Transfer    [place] Level (Toilet Transfer) modified independence  -     Assistive Device (Toilet Transfer) raised toilet seat;walker, front-wheeled  -WM       Row Name 05/20/24 1504          Gait/Stairs (Locomotion)    [place] Level (Gait) modified independence  -     Assistive Device (Gait) walker, front-wheeled  -     Distance in Feet (Gait) 300  -WM     Pattern (Gait) 4-point;step-through  -     Deviations/Abnormal Patterns (Gait) gait speed decreased  -WM       Row Name 05/20/24 1504          Safety Issues, Functional Mobility    Impairments Affecting Function (Mobility) balance;endurance/activity tolerance;strength  -WM       Row Name 05/20/24 1504          Hip (Therapeutic Exercise)    Hip  Strengthening (Therapeutic Exercise) bilateral;marching while seated;sitting;2 lb free weight;15 repititions;2 sets  -       Row Name 05/20/24 1504          Knee (Therapeutic Exercise)    Knee Strengthening (Therapeutic Exercise) bilateral;LAQ (long arc quad);hamstring curls;sitting;2 lb free weight;resistance band;green;15 repititions;2 sets  -       Row Name 05/20/24 1504          Aerobic Exercise    Time Performed (Aerobic Exercise) NuStep x 15 minutes, load 6  -       Row Name             Wound 05/10/24 1832 Left anterior greater trochanter Incision    Wound - Properties Group Placement Date: 05/10/24  -HK Placement Time: 1832  -HK Side: Left  -HK Orientation: anterior  -HK Location: greater trochanter  -HK Primary Wound Type: Incision  -HK    Retired Wound - Properties Group Placement Date: 05/10/24  -HK Placement Time: 1832  -HK Side: Left  -HK Orientation: anterior  -HK Location: greater trochanter  -HK Primary Wound Type: Incision  -HK    Retired Wound - Properties Group Date first assessed: 05/10/24  -HK Time first assessed: 1832  -HK Side: Left  -HK Location: greater trochanter  -HK Primary Wound Type: Incision  -HK      Row Name 05/20/24 1504          Progress Summary (PT)    Progress Toward Functional Goals (PT) progress toward functional goals is good  -       Row Name 05/20/24 1504          Bed Mobility Goal 1 (PT)    Progress/Outcomes (Bed Mobility Goal 1, PT) goal met  -[facility] Name 05/20/24 1504          Transfer Goal 1 (PT)    Progress/Outcome (Transfer Goal 1, PT) goal met  -[facility] Name 05/20/24 1504          Gait Training Goal 1 (PT)    Progress/Outcome (Gait Training Goal 1, PT) goal met  -[facility] Name 05/20/24 1504          Strength Goal 1 (PT)    Progress/Outcome (Strength Goal 1, PT) good progress toward goal  -       Row Name 05/20/24 1504          Stairs Goal 1 (PT)    Progress/Outcome (Stairs Goal 1, PT) goal met  -[facility] Name 05/20/24 1504          Patient  Education Goal (PT)    Progress/Outcome (Patient Education Goal, PT) goal met  -WM       Row Name 05/20/24 1504          Discharge Summary (PT)    Additional Documentation Discharge Summary (PT) (Group)  -WM       Row Name 05/20/24 1504          Discharge Summary (PT)    Reason for Discharge (PT) patient discharged from this facility  -WM     Outcomes Achieved/Progress Made Upon Discharge (PT) --  All goals met except strength goal.  -WM     Transfer to Another Level of Care or Facility (PT) home with home health recommended  -WM               User Key  (r) = Recorded By, (t) = Taken By, (c) = Cosigned By      Initials Name Provider Type    Starr Frsot, RN Registered Nurse    Michael Gorman PTA Physical Therapist Assistant                  Section G              Section GG                    Mobility, Discharge Performance ([id number])  Roll Left & Right: Mobility Discharge (SH9114F0): independent (06)  Sit to Lying: Mobility Discharge Performance (PE1912N0): independent (06)  Lying to Sitting, Side of Bed: Mobility Discharge Performance (MG6596G6): independent (06)  Sit to Stand: Mobility Discharge Performance (KL1161M9): independent (06)  Chair/Bed-Chair Transfer: Mobility Discharge Performance (FS9328H3): independent (06)  Toilet Transfer: Mobility Discharge Performance (NM5732W2): independent (06)  Tub/shower Transfer: Mobility Discharge Performance (BP3728NY2): independent (06)  Car Transfer: Mobility Discharge Performance (EY9104J8): not attempted due to environmental limitations (10)  Walk 10 Feet: Mobility Discharge Performance (LV4337K8): independent (06)  Walk 50 Feet With Two Turns: Mobility Discharge Performance (JT7567L5): independent (06)  Walk 150 Feet: Mobility Discharge Performance (AR6605O6): independent (06)  Walk 10 Ft, Uneven Surfaces: Mobility Discharge Performance (TN1111E8): not applicable (09)  1 Step/Curb: Mobility Discharge Performance (VP1065X5): supervision or touching assistance  (04)  4 Steps: Mobility Discharge Performance (EN6680F7): supervision or touching assistance (04)  12 Steps: Mobility Discharge Performance (VY7578X8): not applicable (09)  Picking up object: Mobility Discharge Performance (HC7215I3): supervision or touching assistance (04)  Wheel 50 Ft Two Turns: Mobility Discharge Performance (AS3270A0): not applicable (09)  Wheel 150 Feet: Mobility Discharge Performance (RP1719L7): not applicable (09)    Physical Therapy Education       Title: PT OT SLP Therapies (In Progress)       Topic: Physical Therapy (Done)       Point: Mobility training (Done)       Learning Progress Summary             Patient Acceptance, E,TB, VU by AV at 2024 1335                         Point: Home exercise program (Done)       Learning Progress Summary             Patient Acceptance, E,TB, VU by AV at 2024 1335                         Point: Body mechanics (Done)       Learning Progress Summary             Patient Acceptance, E,TB, VU by AV at 2024 1335                         Point: Precautions (Done)       Learning Progress Summary             Patient Acceptance, E,TB, VU by AV at 2024 1335                                         User Key       Initials Effective Dates Name Provider Type Discipline    AV 21 -  Shimon Navarro, PT Physical Therapist PT                    PT Recommendation and Plan     Progress Summary (PT)  Progress Toward Functional Goals (PT): progress toward functional goals is good     Outcome Measures       Row Name 24 1600 24 1600          How much help from another person do you currently need...    Turning from your back to your side while in flat bed without using bedrails? 4  -CS 4  -CS     Moving from lying on back to sitting on the side of a flat bed without bedrails? 4  -CS 4  -CS     Moving to and from a bed to a chair (including a wheelchair)? 3  -CS 3  -CS     Standing up from a chair using your arms (e.g., wheelchair, bedside  chair)? 4  -CS 4  -CS     Climbing 3-5 steps with a railing? 3  -CS 3  -CS     To walk in hospital room? 4  -CS 3  -CS     AM-PAC 6 Clicks Score (PT) 22  -CS 21  -CS     Highest Level of Mobility Goal 7 --> Walk 25 feet or more  -CS 6 --> Walk 10 steps or more  -CS        Functional Assessment    Outcome Measure Options AM-PAC 6 Clicks Basic Mobility (PT)  -CS AM-PAC 6 Clicks Basic Mobility (PT)  -CS               User Key  (r) = Recorded By, (t) = Taken By, (c) = Cosigned By      Initials Name Provider Type    Vinnie Alvarado PTA Physical Therapist Assistant                     Time Calculation:    PT Charges       Row Name 05/20/24 1503             Time Calculation    PT Received On 05/20/24  -WM         Timed Charges    06913 - PT Therapeutic Exercise Minutes 29  -WM      74884 - Gait Training Minutes  8  -WM      27648 - PT Therapeutic Activity Minutes 3  -WM         SNF Physical Therapy Minutes    Skilled Minutes- PT 40 min  -WM         Total Minutes    Timed Charges Total Minutes 40  -WM       Total Minutes 40  -WM                User Key  (r) = Recorded By, (t) = Taken By, (c) = Cosigned By      Initials Name Provider Type     Michael Wong PTA Physical Therapist Assistant                      PT G-Codes  Outcome Measure Options: AM-PAC 6 Clicks Daily Activity (OT), Optimal Instrument  AM-PAC 6 Clicks Score (PT): 22  AM-PAC 6 Clicks Score (OT): 24         Michael Wong PTA  5/20/2024

## 2024-05-20 NOTE — PROGRESS NOTES
[facility]   Hospitalist Progress Note       Patient Name: Laura Cardenas  : 1940  MRN: [id number]  Primary Care Physician: Ari Brady MD  Date of admission: 2024  Today's Date: 2024  Room / Bed:   [facility]/2  Subjective   Chief Complaint: Weakness     HPI:     Laura Cardenas is a 83 y.o. female past medical history significant for mixed stress and urge incontinence, hyperlipidemia, prediabetes, history of osteoporosis that initially presents to the ED with hip pain patient was noted to have left hip fracture admitted to the hospitalist service orthopedic surgery consulted patient underwent left total hip arthroplasty on 5/10/2024 by Dr. Cruz without complications postoperative course unremarkable seen by PT and OT deemed a good candidate for inpatient rehab is been transferred to skilled nursing facility at [facility]    Interval Followup: 2024    In the gym working out with therapy.  Patient excited and eager to return home tomorrow.  She is doing well with physical therapy.  Hemoglobin remained stable 7-7.5 range.  Pain controlled  VSS    REVIEW OF SYSTEMS:   Left hip pain  Objective   Temp:  [97.5 °F (36.4 °C)-99 °F (37.2 °C)] 97.5 °F (36.4 °C)  Heart Rate:  [83-91] 83  Resp:  [18] 18  BP: (100-119)/(45-60) 119/60  PHYSICAL EXAM   CON: WN. WD. NAD.   NECK:  No thyromegaly. No stridor.   RESP:  CTA. No wheezes. No crackles.  No work of breathing or tachypnea.   CV:  Rhythm regular. Rate WNL. No murmur noted.  No edema.  GI:  Soft and nontender. Nondistended.  Small faint bruise left hip/flank  EXT: Left hip dressing intact.  Significant bruising left thigh/lower leg  PSYCH:  Alert. Oriented. Normal affect and mood.  NEURO:  No dysarthria or aphasia. No unilateral weakness or paresthesia.  SKIN: No chronic venous stasis changes or varicosities.  No cellulitis  Results from last 7 days   Lab Units 24  0436 24  0435 24  0357 24  0428 24  0443   WBC 10*3/mm3  11.15* 10.78  --  9.25 10.94*   HEMOGLOBIN g/dL 7.4* 7.2* 7.2* 7.1* 7.6*   HEMATOCRIT % 23.3* 22.2* 22.9* 21.7* 23.7*   PLATELETS 10*3/mm3 283 235  --  183 182     Results from last 7 days   Lab Units 05/20/24  0436 05/16/24  0428 05/14/24  0443   SODIUM mmol/L 143 141 138   POTASSIUM mmol/L 3.8 3.5 3.8   CO2 mmol/L 25.7 25.9 26.9   CHLORIDE mmol/L 108* 107 105   ANION GAP mmol/L 9.3 8.1 6.1   BUN mg/dL 14 14 13   CREATININE mg/dL 0.60 0.55* 0.61   GLUCOSE mg/dL 104* 107* 120*           COMPLEXITY OF DATA / DECISION MAKING     []  Moderate: One acute illness or mild exacerbation of chronic or 2 stable chronic or tx side effects   []  High:  Severe acute illness or severe exacerbation of chronic - potential for major debility / life threatening         I have personally reviewed the results from the time of this admission to 5/20/2024 14:26 EDT:  []  Laboratory:  []  Microbiology: []  Radiology:  []  Telemetry:   []  Cardiology/Vascular:  []  Pathology:  []  Prior external records:  []  Independent historian provided additional details:      []  Discussed case with specialists:    []  Independent interpretation of ECG/Imaging etc:             []  Moderate: Rx management, low risk surgery, suboptimal social situation   []  High:  Rx with close monitoring for toxicity, mod-high risk surgery, DNR decision, Comfort initiated, IV pain meds    Assessment / Plan   Assessment:    Weakness after recent hospitalization  Dyslipidemia  Stress/urge urinary incontinence  Osteoporosis  Prediabetes  Hx avascular necrosis/collapse femoral head left hip  Status post left total hip arthroplasty 5/10/24 by Dr. Cruz  Recent postop anemia     Plan:    Home with home health tomorrow.  Hemoglobin stable. Continue iron supplement  Continue aspirin for DVT prophylaxis, 5 weeks postop  Follow-up with Dr. Cruz 1 to 2 weeks  Continue daily PT and OT  Weightbearing as tolerated      Discussed plan with RN.  DVT prophylaxis:  No DVT  prophylaxis order currently exists.      CODE STATUS:      Level Of Support Discussed With: Patient  Code Status (Patient has no pulse and is not breathing): CPR (Attempt to Resuscitate)  Medical Interventions (Patient has pulse or is breathing): Full Support

## 2024-05-20 NOTE — THERAPY TREATMENT NOTE
SNF - Occupational Therapy Treatment Note   [place]    Patient Name: Laura Cardenas  : 1940    MRN: [id number]                              Today's Date: 2024       Admit Date: 2024    Visit Dx:     ICD-10-CM ICD-9-CM   1. Decreased activities of daily living (ADL)  Z78.9 V49.89   2. Difficulty walking  R26.2 719.7     Patient Active Problem List   Diagnosis    Gastro-esophageal reflux disease without esophagitis    Mixed hyperlipidemia    Impaired fasting glucose    Vitamin D deficiency    Primary osteoarthritis involving multiple joints    Age-related osteoporosis without current pathological fracture    Suprapubic fullness    Medicare annual wellness visit, subsequent    Mixed stress and urge urinary incontinence    Non-seasonal allergic rhinitis due to pollen    History of DVT of lower extremity    Left lumbar radiculopathy    Hip fx, left, closed, initial encounter    Closed fracture of left hip    Physical debility     Past Medical History:   Diagnosis Date    Acute embolism and thrombosis of deep vein of left lower extremity     Age-related osteoporosis without current pathological fracture     Disorder of bone, unspecified     Diverticulosis     DJD (degenerative joint disease)     GERD without esophagitis     History of transfusion     59 years ago for childbirth    Mixed hyperlipidemia     Primary generalized (osteo)arthritis     Spinal headache      Past Surgical History:   Procedure Laterality Date    BACK SURGERY      BACK SURGERY      S/P Lumbar    BREAST BIOPSY      Breast Bx (81 & 98)    CATARACT EXTRACTION      2015 L Cataract 2015 R Cataract    COLONOSCOPY      DR. AWAD    COLONOSCOPY N/A 2021    Procedure: COLONOSCOPY, WITH HOT SNARE POLYPECTOMY,;  Surgeon: Tony Thomas MD;  Location: [facility] ENDOSCOPY;  Service: Gastroenterology;  Laterality: N/A;  DIVERTICULOSIS, COLON POLYP    REPLACEMENT TOTAL KNEE Bilateral     04/10/2019 right knee  replacement 01/2020 knee replacement left    TONSILLECTOMY      10 YEARS OLD    TOTAL HIP ARTHROPLASTY Left 5/10/2024    Procedure: TOTAL HIP ARTHROPLASTY;  Surgeon: Ivan Cruz MD;  Location: [facility] MAIN OR;  Service: Orthopedics;  Laterality: Left;      General Information       Row Name 05/20/24 0821          OT Time and Intention    Document Type therapy note (daily note)  -     Mode of Treatment individual therapy;occupational therapy  -       Row Name 05/20/24 0821          General Information    Patient Profile Reviewed yes  -     Existing Precautions/Restrictions fall  -       Row Name 05/20/24 0821          Safety Issues, Functional Mobility    Impairments Affecting Function (Mobility) balance;endurance/activity tolerance;strength;shortness of breath  -               User Key  (r) = Recorded By, (t) = Taken By, (c) = Cosigned By      Initials Name Provider Type     Shramila Morelos OT Occupational Therapist                     Mobility/ADL's       Row Name 05/20/24 0821          Transfers    Transfers bed-chair transfer;sit-stand transfer;stand-sit transfer;toilet transfer  -       Row Name 05/20/24 0821          Bed-Chair Transfer    Bed-Chair [place] (Transfers) modified independence  -     Assistive Device (Bed-Chair Transfers) walker, front-wheeled  -       Row Name 05/20/24 0821          Sit-Stand Transfer    Sit-Stand [place] (Transfers) modified independence  -     Assistive Device (Sit-Stand Transfers) walker, front-wheeled  -       Row Name 05/20/24 0821          Stand-Sit Transfer    Stand-Sit [place] (Transfers) modified independence  -     Assistive Device (Stand-Sit Transfers) walker, front-wheeled  -       Row Name 05/20/24 0821          Toilet Transfer    [place] Level (Toilet Transfer) modified independence  -     Assistive Device (Toilet Transfer) raised toilet seat  -       Row Name 05/20/24 0821          Functional Mobility    Functional  Mobility- Ind. Level supervision required  -     Functional Mobility- Device walker, front-wheeled  -       Row Name 05/20/24 0821          Bathing Assessment/Intervention    [place] Level (Bathing) bathing skills;supervision;modified independence  -     Assistive Devices (Bathing) grab bar, tub/shower;hand-held shower spray hose;tub bench  -       Row Name 05/20/24 0821          Lower Body Dressing Assessment/Training    [place] Level (Lower Body Dressing) lower body dressing skills;modified independence;supervision  -       Row Name 05/20/24 0821          Upper Body Dressing Assessment/Training    [place] Level (Upper Body Dressing) upper body dressing skills;modified independence;supervision  -       Row Name 05/20/24 0821          Grooming Assessment/Training    [place] Level (Grooming) grooming skills;modified independence  -       Row Name 05/20/24 0821          Toileting Assessment/Training    [place] Level (Toileting) toileting skills;modified independence  -               User Key  (r) = Recorded By, (t) = Taken By, (c) = Cosigned By      Initials Name Provider Type    Sharmila Meneses OT Occupational Therapist                   Obj/Interventions       Row Name 05/20/24 0822          Motor Skills    Functional Endurance Omnicycle x15 minutes with no rest breaks  -     Therapeutic Exercise aerobic  -               User Key  (r) = Recorded By, (t) = Taken By, (c) = Cosigned By      Initials Name Provider Type    Sharmila Meneses OT Occupational Therapist                   Goals/Plan    No documentation.                  Clinical Impression       Row Name 05/20/24 0822          Plan of Care Review    Plan of Care Reviewed With patient  -     Progress improving  -     Outcome Evaluation Pt. progressing in all areas of BADLS to a Mod Indep level using RW with improved balance, endurance, strength and general weakness.  Pt. was better able to reach her feet to kandice  socks this am with little discomfort per patient.  Pt. possibly d/cing home tomorrow pending MD approval.  Pt. is recommended for f/u HH.  -GC       Row Name 05/20/24 0822          Therapy Plan Review/Discharge Plan (OT)    Anticipated Discharge Disposition (OT) home with home health  -GC               User Key  (r) = Recorded By, (t) = Taken By, (c) = Cosigned By      Initials Name Provider Type    GC Sharmila Morelos OT Occupational Therapist                   Outcome Measures       Row Name 05/20/24 0824          How much help from another is currently needed...    Putting on and taking off regular lower body clothing? 4  -GC     Bathing (including washing, rinsing, and drying) 4  -GC     Toileting (which includes using toilet bed pan or urinal) 4  -GC     Putting on and taking off regular upper body clothing 4  -GC     Taking care of personal grooming (such as brushing teeth) 4  -GC     Eating meals 4  -GC     AM-PAC 6 Clicks Score (OT) 24  -GC       Row Name 05/19/24 2106          How much help from another person do you currently need...    Turning from your back to your side while in flat bed without using bedrails? 4  -CC     Moving from lying on back to sitting on the side of a flat bed without bedrails? 4  -CC     Moving to and from a bed to a chair (including a wheelchair)? 3  -CC     Standing up from a chair using your arms (e.g., wheelchair, bedside chair)? 4  -CC     Climbing 3-5 steps with a railing? 3  -CC     To walk in hospital room? 4  -CC     AM-PAC 6 Clicks Score (PT) 22  -CC     Highest Level of Mobility Goal 7 --> Walk 25 feet or more  -CC       Row Name 05/20/24 0824          Functional Assessment    Outcome Measure Options AM-PAC 6 Clicks Daily Activity (OT);Optimal Instrument  -GC       Row Name 05/20/24 0824          Optimal Instrument    Optimal Instrument Optimal - 3  -GC     Bending/Stooping 1  -GC     Standing 1  -GC     Reaching 1  -GC               User Key  (r) = Recorded By, (t) =  Taken By, (c) = Cosigned By      Initials Name Provider Type    CC Caren Springer RN Registered Nurse    Sharmila Meneses OT Occupational Therapist                  Section G  Mobility  Dressing - self performance: limited assistance (staff provide guided maneuvering of limbs or other non-weight bearing assistance)  Dressing support/assistance: One person assist  Toileting - self performance: independent  Toileting support/assistance: No setup or physical help  Personal hygiene - self performance: supervision (oversight, encourage)  Personal hygiene support/assistance: Setup help only  Bathing  Bathing - self performance: Supervision  Bathing support/assistance: Setup only     Range of Motion  Upper Extremity: No impairment  Section GG  SectionGG: Functional Ability/Goals, Adm  Self Care, Prior Functioning (LM5815W): 3. Independent  Functional Cognition, Prior Functioning (ZE6436U): 3. Independent  Upper Extremity Range of Motion (YR4788H): No impairment  Self Care, Admission (Section GG)  Eating: Self-Care Admission Performance (JZ6235U3): independent (06)  Oral Hygiene: Self-Care Admission Performance (LD3203V1): setup or clean-up assistance (05)  Toileting Hygiene: Self-Care Admission Performance (GP0170Y7): supervision or touching assistance (04)  Shower/Bathe Self: Self-Care Admission Performance (PU9931O6): supervision or touching assistance (04)  Upper Body Dressing: Self-Care Admission Performance (UQ1648E4): setup or clean-up assistance (05)  Lower Body Dressing: Self-Care Admission Performance (DE5894K4): partial/moderate assistance (03)  Putting On/Taking Off Footwear: Self-Care Admission Performance (FO9300U8): partial/moderate assistance (03)  Personal Hygiene: Self-Care Admission Performance (RR3769Q1): supervision or touching assistance (04)  Mobility, Admission Performance (JA0874)  Toilet Transfer: Mobility Admission Performance (DE2679A9): supervision or touching assistance  (04)  Tub/shower Transfer: Mobility Admission Performance (RS2026WG9): supervision or touching assistance (04)  Section GG: Functional Ability/Goals, DC  Eating: Self-Care Discharge Goal (HH0470S5): independent (06)  Oral Hygiene: Self-Care Discharge Goal (VX0206C3): independent (06)  Toileting Hygiene: Self-Care Discharge Goal (RM6593X2): independent (06)  Shower/Bathe Self: Self-Care Discharge Goal (IW7145U7): independent (06)  Upper Body Dressing: Self-Care Discharge Goal (LT4252Q7): independent (06)  Lower Body Dressing: Self-Care Discharge Goal (OI0289T7): independent (06)  Putting On/Taking Off Footwear: Self-Care Discharge Goal (XU9872J1): independent (06)  Personal Hygiene: Self-Care Discharge Goal (UJ0901H6): independent (06)  Mobility (GG), Discharge Goal (ZL6370)  Toilet Transfer: Mobility Discharge Goal (RR5706F9): independent (06)  Tub/shower Transfer: Mobility Discharge Goal (NS1666GJ9): independent (06)          Occupational Therapy Education       Title: PT OT SLP Therapies (In Progress)       Topic: Occupational Therapy (In Progress)       Point: ADL training (Done)       Description:   Instruct learner(s) on proper safety adaptation and remediation techniques during self care or transfers.   Instruct in proper use of assistive devices.                  Learning Progress Summary             Patient Acceptance, E, VU,NR by  at 5/14/2024 1151                         Point: Home exercise program (Not Started)       Description:   Instruct learner(s) on appropriate technique for monitoring, assisting and/or progressing therapeutic exercises/activities.                  Learner Progress:  Not documented in this visit.              Point: Precautions (Done)       Description:   Instruct learner(s) on prescribed precautions during self-care and functional transfers.                  Learning Progress Summary             Patient Acceptance, E, VU,NR by  at 5/14/2024 1151                         Point:  Body mechanics (Done)       Description:   Instruct learner(s) on proper positioning and spine alignment during self-care, functional mobility activities and/or exercises.                  Learning Progress Summary             Patient Acceptance, E, VU,NR by  at 5/14/2024 1151                                         User Key       Initials Effective Dates Name Provider Type Discipline     06/16/21 -  Sharmila Morelos OT Occupational Therapist OT                  OT Recommendation and Plan  Planned Therapy Interventions (OT): activity tolerance training, adaptive equipment training, BADL retraining, functional balance retraining, neuromuscular control/coordination retraining, occupation/activity based interventions, patient/caregiver education/training, ROM/therapeutic exercise, strengthening exercise, transfer/mobility retraining  Therapy Frequency (OT): 5 times/wk  Plan of Care Review  Plan of Care Reviewed With: patient  Progress: improving  Outcome Evaluation: Pt. progressing in all areas of BADLS to a Mod Indep level using RW with improved balance, endurance, strength and general weakness.  Pt. was better able to reach her feet to kandice socks this am with little discomfort per patient.  Pt. possibly d/cing home tomorrow pending MD approval.  Pt. is recommended for f/u HH.     Time Calculation:   Evaluation Complexity (OT)  Review Occupational Profile/Medical/Therapy History Complexity: expanded/moderate complexity  Assessment, Occupational Performance/Identification of Deficit Complexity: 3-5 performance deficits  Clinical Decision Making Complexity (OT): detailed assessment/moderate complexity  Overall Complexity of Evaluation (OT): moderate complexity     Time Calculation- OT       Row Name 05/20/24 0825             Time Calculation- OT    OT Received On 05/20/24  -         Timed Charges    86093 - OT Therapeutic Exercise Minutes 15  -GC      10774 - OT Therapeutic Activity Minutes 10  -      75895 - OT  Self Care/Mgmt Minutes 28  -GC         SNF Occupational Therapy Minutes    Skilled Minutes- OT 53 min  -GC         Total Minutes    Timed Charges Total Minutes 53  -GC       Total Minutes 53  -GC                User Key  (r) = Recorded By, (t) = Taken By, (c) = Cosigned By      Initials Name Provider Type     Sharmila Morelos OT Occupational Therapist                  Therapy Charges for Today       Code Description Service Date Service Provider Modifiers Qty    [id number]  OT THER PROC EA 15 MIN 5/20/2024 Sharmila Morelos OT GO 1    [id number] HC OT THERAPEUTIC ACT EA 15 MIN 5/20/2024 Sharmila Morelos OT GO 1    [id number]  OT SELF CARE/MGMT/TRAIN EA 15 MIN 5/20/2024 Sharmila Morelos OT GO 2                 Sharmila Morelos OT  5/20/2024

## 2024-05-21 VITALS
HEART RATE: 75 BPM | WEIGHT: 163.58 LBS | BODY MASS INDEX: 30.1 KG/M2 | RESPIRATION RATE: 16 BRPM | SYSTOLIC BLOOD PRESSURE: 100 MMHG | DIASTOLIC BLOOD PRESSURE: 68 MMHG | HEIGHT: 62 IN | TEMPERATURE: 98 F | OXYGEN SATURATION: 94 %

## 2024-05-21 LAB
ANION GAP SERPL CALCULATED.3IONS-SCNC: 7.6 MMOL/L (ref 5–15)
BUN SERPL-MCNC: 16 MG/DL (ref 8–23)
BUN/CREAT SERPL: 23.9 (ref 7–25)
CALCIUM SPEC-SCNC: 9.1 MG/DL (ref 8.6–10.5)
CHLORIDE SERPL-SCNC: 105 MMOL/L (ref 98–107)
CO2 SERPL-SCNC: 26.4 MMOL/L (ref 22–29)
CREAT SERPL-MCNC: 0.67 MG/DL (ref 0.57–1)
DEPRECATED RDW RBC AUTO: 51.6 FL (ref 37–54)
EGFRCR SERPLBLD CKD-EPI 2021: 87.9 ML/MIN/1.73
ERYTHROCYTE [DISTWIDTH] IN BLOOD BY AUTOMATED COUNT: 15 % (ref 12.3–15.4)
GLUCOSE SERPL-MCNC: 160 MG/DL (ref 65–99)
HCT VFR BLD AUTO: 26.1 % (ref 34–46.6)
HGB BLD-MCNC: 8.1 G/DL (ref 12–15.9)
MCH RBC QN AUTO: 30.5 PG (ref 26.6–33)
MCHC RBC AUTO-ENTMCNC: 31 G/DL (ref 31.5–35.7)
MCV RBC AUTO: 98.1 FL (ref 79–97)
PLATELET # BLD AUTO: 320 10*3/MM3 (ref 140–450)
PMV BLD AUTO: 9.2 FL (ref 6–12)
POTASSIUM SERPL-SCNC: 3.8 MMOL/L (ref 3.5–5.2)
RBC # BLD AUTO: 2.66 10*6/MM3 (ref 3.77–5.28)
SODIUM SERPL-SCNC: 139 MMOL/L (ref 136–145)
WBC NRBC COR # BLD AUTO: 10.8 10*3/MM3 (ref 3.4–10.8)

## 2024-05-21 PROCEDURE — 80048 BASIC METABOLIC PNL TOTAL CA: CPT | Performed by: PHYSICIAN ASSISTANT

## 2024-05-21 PROCEDURE — 99316 NF DSCHRG MGMT 30 MIN+: CPT | Performed by: PHYSICIAN ASSISTANT

## 2024-05-21 PROCEDURE — 85027 COMPLETE CBC AUTOMATED: CPT | Performed by: PHYSICIAN ASSISTANT

## 2024-05-21 RX ORDER — CEFDINIR 300 MG/1
300 CAPSULE ORAL 2 TIMES DAILY
Qty: 9 CAPSULE | Refills: 0 | Status: SHIPPED | OUTPATIENT
Start: 2024-05-21

## 2024-05-21 RX ORDER — CEFDINIR 300 MG/1
300 CAPSULE ORAL ONCE
Status: COMPLETED | OUTPATIENT
Start: 2024-05-21 | End: 2024-05-21

## 2024-05-21 RX ADMIN — OXYBUTYNIN CHLORIDE 10 MG: 5 TABLET, EXTENDED RELEASE ORAL at 08:28

## 2024-05-21 RX ADMIN — GABAPENTIN 300 MG: 300 CAPSULE ORAL at 08:29

## 2024-05-21 RX ADMIN — CEFDINIR 300 MG: 300 CAPSULE ORAL at 08:28

## 2024-05-21 RX ADMIN — ESCITALOPRAM OXALATE 10 MG: 10 TABLET ORAL at 08:29

## 2024-05-21 RX ADMIN — Medication 600 MG: at 08:29

## 2024-05-21 RX ADMIN — PANTOPRAZOLE SODIUM 40 MG: 40 TABLET, DELAYED RELEASE ORAL at 05:24

## 2024-05-21 RX ADMIN — DOCOSANOL 1 APPLICATION: 100 CREAM TOPICAL at 07:12

## 2024-05-21 RX ADMIN — FERROUS SULFATE TAB 325 MG (65 MG ELEMENTAL FE) 325 MG: 325 (65 FE) TAB at 08:29

## 2024-05-21 RX ADMIN — ASPIRIN 325 MG: 325 TABLET ORAL at 08:29

## 2024-05-21 NOTE — THERAPY DISCHARGE NOTE
SNF - Occupational Therapy Discharge   [place]    Patient Name: Laura Cardenas  : 1940    MRN: [id number]                              Today's Date: 2024       Admit Date: 2024    Visit Dx:     ICD-10-CM ICD-9-CM   1. Decreased activities of daily living (ADL)  Z78.9 V49.89   2. Difficulty walking  R26.2 719.7     Patient Active Problem List   Diagnosis    Gastro-esophageal reflux disease without esophagitis    Mixed hyperlipidemia    Impaired fasting glucose    Vitamin D deficiency    Primary osteoarthritis involving multiple joints    Age-related osteoporosis without current pathological fracture    Suprapubic fullness    Medicare annual wellness visit, subsequent    Mixed stress and urge urinary incontinence    Non-seasonal allergic rhinitis due to pollen    History of DVT of lower extremity    Left lumbar radiculopathy    Hip fx, left, closed, initial encounter    Closed fracture of left hip    Physical debility     Past Medical History:   Diagnosis Date    Acute embolism and thrombosis of deep vein of left lower extremity     Age-related osteoporosis without current pathological fracture     Disorder of bone, unspecified     Diverticulosis     DJD (degenerative joint disease)     GERD without esophagitis     History of transfusion     61 years ago for childbirth    Mixed hyperlipidemia     Primary generalized (osteo)arthritis     Spinal headache      Past Surgical History:   Procedure Laterality Date    BACK SURGERY      BACK SURGERY      S/P Lumbar    BREAST BIOPSY      Breast Bx (81 & 98)    CATARACT EXTRACTION      2015 L Cataract 2015 R Cataract    COLONOSCOPY      DR. AWAD    COLONOSCOPY N/A 2021    Procedure: COLONOSCOPY, WITH HOT SNARE POLYPECTOMY,;  Surgeon: Tony Thomas MD;  Location[facility] ENDOSCOPY;  Service: Gastroenterology;  Laterality: N/A;  DIVERTICULOSIS, COLON POLYP    REPLACEMENT TOTAL KNEE Bilateral     04/10/2019 right knee  replacement 01/2020 knee replacement left    TONSILLECTOMY      10 YEARS OLD    TOTAL HIP ARTHROPLASTY Left 5/10/2024    Procedure: TOTAL HIP ARTHROPLASTY;  Surgeon: Ivan Cruz MD;  Location: [facility] MAIN OR;  Service: Orthopedics;  Laterality: Left;      General Information    No documentation.                  Mobility/ADL's    No documentation.                  Obj/Interventions    No documentation.                  Goals/Plan       Row Name 05/21/24 0602          Transfer Goal 1 (OT)    Activity/Assistive Device (Transfer Goal 1, OT) transfers, all  -GC     [place] Level/Cues Needed (Transfer Goal 1, OT) modified independence  -GC     Progress/Outcome (Transfer Goal 1, OT) goal met  -GC       Row Name 05/21/24 0602          Bathing Goal 1 (OT)    Activity/Device (Bathing Goal 1, OT) bathing skills, all  -GC     [place] Level/Cues Needed (Bathing Goal 1, OT) modified independence  -GC     Progress/Outcomes (Bathing Goal 1, OT) goal met  -GC       Row Name 05/21/24 0602          Dressing Goal 1 (OT)    Activity/Device (Dressing Goal 1, OT) dressing skills, all  -GC     [place]/Cues Needed (Dressing Goal 1, OT) modified independence  -GC     Progress/Outcome (Dressing Goal 1, OT) goal met  -GC       Row Name 05/21/24 0602          Toileting Goal 1 (OT)    Activity/Device (Toileting Goal 1, OT) toileting skills, all  -GC     [place] Level/Cues Needed (Toileting Goal 1, OT) modified independence  -GC     Progress/Outcome (Toileting Goal 1, OT) goal met  -GC       Row Name 05/21/24 0602          Problem Specific Goal 1 (OT)    Problem Specific Goal 1 (OT) Patient will demonstrate good activity tolerance for ADLs.  -GC     Progress/Outcome (Problem Specific Goal 1, OT) goal partially met  -GC               User Key  (r) = Recorded By, (t) = Taken By, (c) = Cosigned By      Initials Name Provider Type    Sharmila Meneses OT Occupational Therapist                   Clinical Impression       Row Name  05/21/24 0603          Plan of Care Review    Outcome Evaluation Pt. progressed to Mod Indep level for BADLS using RW.  Pt. discharging home today with  to assist as needed.  Follow up HH recommended.  -               User Key  (r) = Recorded By, (t) = Taken By, (c) = Cosigned By      Initials Name Provider Type    Sharmila Meneses OT Occupational Therapist                   Outcome Measures       Row Name 05/21/24 0121          How much help from another person do you currently need...    Turning from your back to your side while in flat bed without using bedrails? 4  -FM     Moving from lying on back to sitting on the side of a flat bed without bedrails? 4  -FM     Moving to and from a bed to a chair (including a wheelchair)? 3  -FM     Standing up from a chair using your arms (e.g., wheelchair, bedside chair)? 4  -FM     Climbing 3-5 steps with a railing? 3  -FM     To walk in hospital room? 4  -FM     AM-PAC 6 Clicks Score (PT) 22  -FM     Highest Level of Mobility Goal 7 --> Walk 25 feet or more  -FM               User Key  (r) = Recorded By, (t) = Taken By, (c) = Cosigned By      Initials Name Provider Type    FM Karly Briones RN Registered Nurse                  Section G  Mobility  Dressing - self performance: limited assistance (staff provide guided maneuvering of limbs or other non-weight bearing assistance)  Dressing support/assistance: One person assist  Toileting - self performance: independent  Toileting support/assistance: No setup or physical help  Personal hygiene - self performance: supervision (oversight, encourage)  Personal hygiene support/assistance: Setup help only  Bathing  Bathing - self performance: Supervision  Bathing support/assistance: Setup only     Range of Motion  Upper Extremity: No impairment  Section GG  SectionGG: Functional Ability/Goals, Adm  Self Care, Prior Functioning (NC8487Q): 3. Independent  Functional Cognition, Prior Functioning (VK8878J): 3.  Independent  Upper Extremity Range of Motion (CK5724R): No impairment  Self Care, Admission (Section GG)  Eating: Self-Care Admission Performance (TY3014O1): independent (06)  Oral Hygiene: Self-Care Admission Performance (FW7848E9): setup or clean-up assistance (05)  Toileting Hygiene: Self-Care Admission Performance (IK4050Y0): supervision or touching assistance (04)  Shower/Bathe Self: Self-Care Admission Performance (SO8194O9): supervision or touching assistance (04)  Upper Body Dressing: Self-Care Admission Performance (XF6080H7): setup or clean-up assistance (05)  Lower Body Dressing: Self-Care Admission Performance (CZ2426T9): partial/moderate assistance (03)  Putting On/Taking Off Footwear: Self-Care Admission Performance (HK2288S8): partial/moderate assistance (03)  Personal Hygiene: Self-Care Admission Performance (BL8630X9): supervision or touching assistance (04)  Mobility, Admission Performance (OH6205)  Toilet Transfer: Mobility Admission Performance (ZJ1249J1): supervision or touching assistance (04)  Tub/shower Transfer: Mobility Admission Performance (ZV8811PA4): supervision or touching assistance (04)  Section GG: Functional Ability/Goals, DC  Eating: Self-Care Discharge Goal (ZS1499G6): independent (06)  Oral Hygiene: Self-Care Discharge Goal (AZ8642B0): independent (06)  Toileting Hygiene: Self-Care Discharge Goal (NJ2462E7): independent (06)  Shower/Bathe Self: Self-Care Discharge Goal (XJ9007J3): independent (06)  Upper Body Dressing: Self-Care Discharge Goal (JW1382C3): independent (06)  Lower Body Dressing: Self-Care Discharge Goal (NN5024W7): independent (06)  Putting On/Taking Off Footwear: Self-Care Discharge Goal (GZ3670J4): independent (06)  Personal Hygiene: Self-Care Discharge Goal (BN8888C4): independent (06)  Mobility (GG), Discharge Goal (TF3222)  Toilet Transfer: Mobility Discharge Goal (OA7209I2): independent (06)  Tub/shower Transfer: Mobility Discharge Goal (MU0627AM6):  independent (06)  Section GG: Functional Ability/Goals, DC  Eating: Self-Care Discharge Performance (JM8216J3): independent (06)  Oral Hygiene: Self-Care Discharge Performance (BA3819L4): independent (06)  Toileting Hygiene: Self-Care Discharge Performance (LB7172E1): independent (06)  Shower/Bathe Self: Self-Care Discharge Performance (AZ2112E5): independent (06)  Upper Body Dressing: Self-Care Discharge Performance (PW0228W1): independent (06)  Lower Body Dressing: Self-Care Discharge Performance (EJ3256Z5): independent (06)  Putting On/Taking Off Footwear: Self-Care Discharge Performance (SV0249J4): independent (06)  Personal Hygiene: Self-Care Discharge Performance (GB0126K7): independent (06)  Mobility, Discharge Performance (WT6654)  Toilet Transfer: Mobility Discharge Performance (KN7374Q4): independent (06)  Tub/shower Transfer: Mobility Discharge Performance (ZB8531YG6): independent (06)    Occupational Therapy Education       Title: PT OT SLP Therapies (In Progress)       Topic: Occupational Therapy (In Progress)       Point: ADL training (Done)       Description:   Instruct learner(s) on proper safety adaptation and remediation techniques during self care or transfers.   Instruct in proper use of assistive devices.                  Learning Progress Summary             Patient Acceptance, E, VU,NR by  at 5/14/2024 1151                         Point: Home exercise program (Not Started)       Description:   Instruct learner(s) on appropriate technique for monitoring, assisting and/or progressing therapeutic exercises/activities.                  Learner Progress:  Not documented in this visit.              Point: Precautions (Done)       Description:   Instruct learner(s) on prescribed precautions during self-care and functional transfers.                  Learning Progress Summary             Patient Acceptance, E, VU,NR by  at 5/14/2024 1151                         Point: Body mechanics (Done)        Description:   Instruct learner(s) on proper positioning and spine alignment during self-care, functional mobility activities and/or exercises.                  Learning Progress Summary             Patient Acceptance, E, VU,NR by  at 5/14/2024 1151                                         User Key       Initials Effective Dates Name Provider Type Discipline     06/16/21 -  Sharmila Morelos OT Occupational Therapist OT                  OT Recommendation and Plan  Planned Therapy Interventions (OT): activity tolerance training, adaptive equipment training, BADL retraining, functional balance retraining, neuromuscular control/coordination retraining, occupation/activity based interventions, patient/caregiver education/training, ROM/therapeutic exercise, strengthening exercise, transfer/mobility retraining  Therapy Frequency (OT): 5 times/wk  Plan of Care Review  Plan of Care Reviewed With: patient  Progress: improving  Outcome Evaluation: Pt. progressed to Mod Indep level for BADLS using RW.  Pt. discharging home today with  to assist as needed.  Follow up HH recommended.  Plan of Care Reviewed With: patient  Outcome Evaluation: Pt. progressed to Mod Indep level for BADLS using RW.  Pt. discharging home today with  to assist as needed.  Follow up HH recommended.     Time Calculation:   Evaluation Complexity (OT)  Review Occupational Profile/Medical/Therapy History Complexity: expanded/moderate complexity  Assessment, Occupational Performance/Identification of Deficit Complexity: 3-5 performance deficits  Clinical Decision Making Complexity (OT): detailed assessment/moderate complexity  Overall Complexity of Evaluation (OT): moderate complexity      Therapy Charges for Today       Code Description Service Date Service Provider Modifiers Qty    [id number]  OT THER PROC EA 15 MIN 5/20/2024 Sharmila Morelos OT GO 1    [id number]  OT THERAPEUTIC ACT EA 15 MIN 5/20/2024 Sharmila Morelos OT GO 1    [id number]  HC OT SELF CARE/MGMT/TRAIN EA 15 MIN 5/20/2024 Sharmila Morelos OT GO 2                 Sharmila Morelos OT  5/21/2024

## 2024-05-21 NOTE — PLAN OF CARE
Goal Outcome Evaluation:  Plan of Care Reviewed With: patient        Progress: improving  Outcome Evaluation: Rsd. is alert and oriented, able to make needs known to staff.  Rsd. transfers x1 assist to bathroom with rolling walker and gaitbelt.  Medicated x1 this shift with prn Tylenol, low grade temp of 100.8 this evening.   Will continue to monitor and notify on-coming shift. Call light and personal items within reach, Rsd. reminded to use call light for assistance, verbalizes understanding.  Will continue to monitor and notify on-coming staff. Current plan of care remains in place at this time.

## 2024-05-21 NOTE — NURSING NOTE
Patient is alert and oriented x4. Denies pain. Has congested productive cough with green sputum. Lungs are clear/dimished. O2 sat is WNL. Started on oral antibiotics this morning with no adverse reaction noted. Is discharging today. Patient is independent in her room per therapy. Dressing to left hip changed. Scant amount of dried serous drainage on old dressing. No active drainage noted. No S/S of infection present at incision site. Staples present. Mild edema present to left hip and leg. Patient to follow up with PCP and Orthopedics later this week. Patient and her son notified of appointment dates/times. Son to provide transportation home.

## 2024-05-21 NOTE — DISCHARGE SUMMARY
[facility]  HOSPITALIST  DISCHARGE SUMMARY       Patient Name: Laura Cardenas  : 1940  MRN: [id number]  Primary Care Physician: Ari Brady MD    Date of Admission to SNF rehab: 2024  Date of Discharge from SNF rehab: 2024  Discharge Diagnoses   Weakness after recent hospitalization  Dyslipidemia  Stress/urge urinary incontinence  Osteoporosis  Prediabetes  Hx avascular necrosis/collapse femoral head left hip  Status post left total hip arthroplasty 5/10/24 by Dr. Cruz  Recent postop anemia  Sinusitis/URI  Hospital Course   Hospital Course:  Laura Cardenas is a 83 y.o. female past medical history significant for mixed stress and urge incontinence, hyperlipidemia, prediabetes, history of osteoporosis that initially presents to the ED with hip pain patient was noted to have left hip fracture admitted to the hospitalist service orthopedic surgery consulted patient underwent left total hip arthroplasty on 5/10/2024 by Dr. Cruz without complications postoperative course unremarkable seen by PT and OT deemed a good candidate for inpatient rehab is been transferred to skilled nursing facility at [facility]     Admitted to SNF on 24.  She had no setbacks in SNF.  She worked with therapy on a daily basis.  Her hemoglobin remained stable.  Tolerating iron supplement.  Tolerating DVT prophylaxis with aspirin, per orthopedist.  Pain well-controlled.  Had small fever and some sinus congestion day of discharge.  Already feels better and very eager to return home.  Plan on follow-up with PCP in 1 week.  Short course of cefdinir prescribed.  Also follow-up with orthopedist 1 to 2 weeks.  Continuing orthopedic recommendations for DVT prophylaxis with ASA BID.    Discharge Follow Up / Recommendations (labs, diagnostics, meds, etc):   PCP and Orthopedic surgeon  Future Appointments   Date Time Provider Department Center   2024  2:00 PM Ari Brady MD [facility]   2024  8:45 AM  Ivan Crzu MD [facility]   9/26/2024  9:00 AM Ari Brady MD [facility]     Consultants     Consults       Date and Time Order Name Status Description    5/13/2024  4:55 PM Inpatient Hospitalist Consult      5/9/2024  8:26 PM Inpatient Orthopedic Surgery Consult Completed           On Day of Discharge   VS: Temp:  [97.3 °F (36.3 °C)-100.6 °F (38.1 °C)] 98 °F (36.7 °C)  Heart Rate:  [75-91] 75  Resp:  [16-18] 16  BP: (100-122)/(36-68) 100/68  EXAM:      CON:     WN. WD. NAD.   NECK:              No thyromegaly. No stridor.   RESP:              CTA. No wheezes. No crackles.   CV:        Rhythm regular. Rate WNL. No murmur noted.  No edema.  GI:                    Soft and nontender. Nondistended.  Small faint bruise left hip/flank  EXT:      Left hip dressing intact.  Resolving bruise left thigh/lower leg  PSYCH:            Alert. Oriented. Normal affect and mood.  NEURO:           No dysarthria or aphasia. No unilateral weakness or paresthesia.     Discharge Medications        New Medications        Instructions Start Date   acetaminophen 500 MG tablet  Commonly known as: TYLENOL   500 mg, Oral, Every 6 Hours PRN      aspirin 325 MG EC tablet   325 mg, Oral, 2 Times Daily   Start Date: May 22, 2024     cefdinir 300 MG capsule  Commonly known as: OMNICEF   300 mg, Oral, 2 Times Daily      ferrous sulfate 325 (65 FE) MG tablet   325 mg, Oral, Daily With Breakfast             Changes to Medications        Instructions Start Date   gabapentin 300 MG capsule  Commonly known as: NEURONTIN  What changed:   how much to take  how to take this  when to take this   Take 1 capsule every a.m., take 2 capsules every p.m.             Continue These Medications        Instructions Start Date   BIOTIN PO   1 capsule, Oral, Daily, OTC      Calcium Carbonate 1500 (600 Ca) MG tablet   600 mg, Oral, 2 Times Daily With Meals      escitalopram 10 MG tablet  Commonly known as: LEXAPRO   10 mg, Oral, Daily       esomeprazole 20 MG packet  Commonly known as: NexIUM   20 mg, Oral, Every Morning Before Breakfast      estradiol 0.1 MG/GM vaginal cream  Commonly known as: ESTRACE   Insert 1 g into the vagina 3 (Three) Times a Week.      Gemtesa 75 MG tablet  Generic drug: Vibegron   1 tablet, Oral, Daily      MAGNESIUM PO   1 capsule, Oral, Daily, OTC      multivitamin tablet tablet   1 tablet, Oral, Nightly      pravastatin 40 MG tablet  Commonly known as: PRAVACHOL   TAKE ONE TABLET BY MOUTH ONCE NIGHTLY             Stop These Medications      diclofenac 75 MG EC tablet  Commonly known as: VOLTAREN     meloxicam 7.5 MG tablet  Commonly known as: Mobic            Procedures   None in rehab  Imaging   None in rehab  Discharge Details   Hospital Diet:     Diet Order   Procedures    Diet: Regular/House; Fluid Consistency: Thin (IDDSI 0)     CODE STATUS:    Code Status and Medical Interventions:   Ordered at: 05/13/24 1655     Level Of Support Discussed With:    Patient     Code Status (Patient has no pulse and is not breathing):    CPR (Attempt to Resuscitate)     Medical Interventions (Patient has pulse or is breathing):    Full Support     Additional Instructions for the Follow-ups that You Need to Schedule       Discharge Follow-up with PCP   As directed       Currently Documented PCP:    Ari Brady MD    PCP Phone Number:    [phone]     Follow Up Details: 2-4 weeks        Discharge Follow-up with Specified Provider: Dr. Cruz; 2 Weeks   As directed      To: Dr. Cruz   Follow Up: 2 Weeks              Discharge Disposition: Home-Health Care [facility]  Pertinent  Labs   LAB RESULTS:      Lab 05/20/24  0436 05/19/24  0435 05/17/24  0357 05/16/24  0428   WBC 11.15* 10.78  --  9.25   HEMOGLOBIN 7.4* 7.2* 7.2* 7.1*   HEMATOCRIT 23.3* 22.2* 22.9* 21.7*   PLATELETS 283 235  --  183   NEUTROS ABS 7.43*  --   --   --    IMMATURE GRANS (ABS) 0.11*  --   --   --    LYMPHS ABS 2.38  --   --   --    MONOS ABS 0.94*  --   --   --     EOS ABS 0.25  --   --   --    MCV 96.7 95.3  --  93.9         Lab 05/20/24  0436 05/16/24  0428   SODIUM 143 141   POTASSIUM 3.8 3.5   CHLORIDE 108* 107   CO2 25.7 25.9   ANION GAP 9.3 8.1   BUN 14 14   CREATININE 0.60 0.55*   EGFR 90.3 92.2   GLUCOSE 104* 107*   CALCIUM 8.9 8.5*   MAGNESIUM 2.0  --          Lab 05/16/24  0428   TOTAL PROTEIN 5.2*   ALBUMIN 2.7*   GLOBULIN 2.5   ALT (SGPT) 17   AST (SGOT) 36*   BILIRUBIN 0.5   ALK PHOS 80                     Brief Urine Lab Results  (Last result in the past 365 days)        Color   Clarity   Blood   Leuk Est   Nitrite   Protein   CREAT   Urine HCG        05/12/24 0834 Yellow   Clear   Negative   Negative   Negative   Negative                 Microbiology Results (last 10 days)       ** No results found for the last 240 hours. **          Labs Pending at Discharge:   Pending Labs       Order Current Status    Basic Metabolic Panel In process    CBC (No Diff) In process          Time spent on Discharge including face to face service: > 30 minutes  Electronically signed by BIANCA Ferro, 05/21/24, 8:57 AM EDT.

## 2024-05-22 ENCOUNTER — TRANSITIONAL CARE MANAGEMENT TELEPHONE ENCOUNTER (OUTPATIENT)
Dept: CALL CENTER | Facility: HOSPITAL | Age: 84
End: 2024-05-22
Payer: MEDICARE

## 2024-05-22 ENCOUNTER — READMISSION MANAGEMENT (OUTPATIENT)
Dept: CALL CENTER | Facility: HOSPITAL | Age: 84
End: 2024-05-22
Payer: MEDICARE

## 2024-05-22 ENCOUNTER — OFFICE VISIT (OUTPATIENT)
Dept: INTERNAL MEDICINE | Age: 84
End: 2024-05-22
Payer: MEDICARE

## 2024-05-22 VITALS
HEART RATE: 86 BPM | BODY MASS INDEX: 30.03 KG/M2 | DIASTOLIC BLOOD PRESSURE: 70 MMHG | WEIGHT: 163.2 LBS | HEIGHT: 62 IN | TEMPERATURE: 97.8 F | OXYGEN SATURATION: 98 % | SYSTOLIC BLOOD PRESSURE: 130 MMHG

## 2024-05-22 DIAGNOSIS — S72.002D CLOSED FRACTURE OF LEFT HIP WITH ROUTINE HEALING, SUBSEQUENT ENCOUNTER: ICD-10-CM

## 2024-05-22 DIAGNOSIS — R73.01 IMPAIRED FASTING GLUCOSE: ICD-10-CM

## 2024-05-22 DIAGNOSIS — Z09 HOSPITAL DISCHARGE FOLLOW-UP: Primary | ICD-10-CM

## 2024-05-22 DIAGNOSIS — D62 ACUTE POSTOPERATIVE ANEMIA DUE TO EXPECTED BLOOD LOSS: ICD-10-CM

## 2024-05-22 PROCEDURE — 1160F RVW MEDS BY RX/DR IN RCRD: CPT | Performed by: INTERNAL MEDICINE

## 2024-05-22 PROCEDURE — 1111F DSCHRG MED/CURRENT MED MERGE: CPT | Performed by: INTERNAL MEDICINE

## 2024-05-22 PROCEDURE — 99495 TRANSJ CARE MGMT MOD F2F 14D: CPT | Performed by: INTERNAL MEDICINE

## 2024-05-22 PROCEDURE — 1159F MED LIST DOCD IN RCRD: CPT | Performed by: INTERNAL MEDICINE

## 2024-05-22 NOTE — ASSESSMENT & PLAN NOTE
Patient has been having pain in the left hip for almost 6 months.  She had an x-ray back in December without any evidence of fracture or dislocation.  She does have underlying osteoporosis.  She was being treated for probable bursitis since the x-ray was negative, but her symptoms progressed.  She had repeat imaging which unfortunately revealed significant hip fracture, she was admitted and underwent that surgery on 5/9/2024 by Dr. Cruz.      H&P discharge summaries were reviewed, please see individualized headings for further details.

## 2024-05-22 NOTE — PROGRESS NOTES
"Chief Complaint  Hospital f/u (Pt had her left hip replaced May 10th, she developed a cold in rehab, she states that she is doing a lot better now. )    Subjective          Laura Cardenas presents to [facility]     Hyperlipidemia  Pertinent negatives include no chest pain or shortness of breath.     History of present illness:  83-year-old female with underlying hyperlipidemia, IFG, reflux, who is coming in 3/24 for routine 6-month follow-up.  We will go over her med list in detail, review her labs, and make further recommendations at that time.    ---> Patient being seen in 5/24 for hospital follow-up:  Date of Admission: 5/9/2024  Date of Discharge:  5/13/2024  Date of Admission to SNF rehab: 5/13/2024  Date of Discharge from SNF rehab: 5/21/2024    Left femoral head and neck fracture likely secondary to traumatic fall with underlying osteoporosis  Chronic left lumbar radiculopathy  History of osteoporosis  Prediabetes  Dyslipidemia  Mixed stress and urge urinary incontinence       Review of Systems   Constitutional:  Negative for appetite change, fatigue and fever.   HENT:  Negative for congestion and ear pain.    Eyes:  Negative for blurred vision.   Respiratory:  Negative for cough, chest tightness, shortness of breath and wheezing.    Cardiovascular:  Negative for chest pain, palpitations and leg swelling.   Gastrointestinal:  Negative for abdominal pain.   Genitourinary:  Negative for difficulty urinating, dysuria and hematuria.   Musculoskeletal:  Negative for arthralgias and gait problem.   Skin:  Negative for skin lesions.   Neurological:  Negative for syncope, memory problem and confusion.   Psychiatric/Behavioral:  Negative for self-injury and depressed mood.        Objective   Vital Signs:   /70   Pulse 86   Temp 97.8 °F (36.6 °C) (Skin)   Ht 157.5 cm (62.01\")   Wt 74 kg (163 lb 3.2 oz)   SpO2 98%   BMI 29.84 kg/m²           Physical Exam  Vitals and " nursing note reviewed.   Constitutional:       General: She is not in acute distress.     Appearance: Normal appearance. She is not toxic-appearing.   HENT:      Head: Atraumatic.      Right Ear: External ear normal.      Left Ear: External ear normal.      Nose: Nose normal.      Mouth/Throat:      Mouth: Mucous membranes are moist.   Eyes:      General:         Right eye: No discharge.         Left eye: No discharge.      Extraocular Movements: Extraocular movements intact.      Pupils: Pupils are equal, round, and reactive to light.   Cardiovascular:      Rate and Rhythm: Normal rate and regular rhythm.      Pulses: Normal pulses.      Heart sounds: Normal heart sounds. No murmur heard.     No gallop.   Pulmonary:      Effort: Pulmonary effort is normal. No respiratory distress.      Breath sounds: No wheezing, rhonchi or rales.   Abdominal:      General: There is no distension.      Palpations: Abdomen is soft. There is no mass.      Tenderness: There is no abdominal tenderness. There is no guarding.   Musculoskeletal:         General: No swelling or tenderness.      Cervical back: No tenderness.      Right lower leg: No edema.      Left lower leg: No edema.   Skin:     General: Skin is warm and dry.      Findings: No rash.   Neurological:      General: No focal deficit present.      Mental Status: She is alert and oriented to person, place, and time. Mental status is at baseline.      Motor: No weakness.      Gait: Gait normal.   Psychiatric:         Mood and Affect: Mood normal.         Thought Content: Thought content normal.          Result Review :   The following data was reviewed by: Ari Brady MD on 10/06/2021:                  Assessment and Plan    Diagnoses and all orders for this visit:    1. Hospital discharge follow-up (Primary)  Assessment & Plan:  Patient has been having pain in the left hip for almost 6 months.  She had an x-ray back in December without any evidence of fracture or  dislocation.  She does have underlying osteoporosis.  She was being treated for probable bursitis since the x-ray was negative, but her symptoms progressed.  She had repeat imaging which unfortunately revealed significant hip fracture, she was admitted and underwent that surgery on 5/9/2024 by Dr. Cruz.      H&P discharge summaries were reviewed, please see individualized headings for further details.      2. Acute postoperative anemia due to expected blood loss  Assessment & Plan:  Patient's hemoglobin was down from 13 to 7.1 immediate postop period she was watched closely, hemoglobin improved to 8.1 prior to discharge.  She is not hypotensive with this.  She is on aspirin for DVT prophylaxis and not having any stomach upset.  She is tolerating iron and will add vitamin C to help the absorption.  Will repeat studies at her follow-up later this year.      3. Impaired fasting glucose  Assessment & Plan:  Patient had very reasonable sugars obtained during her recent hospitalization for hip fracture.  We were going to repeat a A1c next month, but I think we can wait till she comes back later this year.      4. Closed fracture of left hip with routine healing, subsequent encounter  Assessment & Plan:  Patient is having a nice recovery from this, she was on skilled nursing for a week as noted, and home health come to start working with her tomorrow.  She completed Lovenox therapy during her hospitalization, and is on full-strength aspirin presently.  Not having any side effects to this fortunately.    On exam she does have some swelling in the L extremity, but no calf tenderness.  Discussed with patient to try to keep leg elevated more during the day.    Pain is well-controlled presently she just using some as needed Tylenol, and as noted she is on aspirin therapy.    Has follow-up with Dr. Cruz tomorrow, appreciate his expertise.             --  --  OLDER NOTES:  VISIT 3/21:  ANNUAL MEDICARE WELLNESS EXAM 9/20 =  "reviewed all forms in office with pt; no new discoveries.  OVERWEIGHT and d/w plan of care=lower diet by 500 jim and 10,000 steps/day.  --  ANXIETY D/O per HPI 4/17 = gasps for breath periodically, ever since had tubes tied b/c relative told her it would do that; lost sister, daughter with pulm fibrosis, grand-daughter with blood clot and pregnant,  had MI few months ago, so will start SSRI...better 7/17 = no c/o.  --  PALPITATIONS = x3, at rest, but not very active though, very brief, but will add low dose beta blocker on RTO if same c/o.  \"HTN\" = white-coat or whathaveyou b/c is very normal elsewhere...says it's fine at home...wnl in office again today---> fine at home.  --  IFG is stable w/o meds=5.8 (1/18)...6.4---> 6.2 holding in 3/21.  LIPIDS  and rec tx (3/15)... down 40 to 130 and rec 20 mg dose now...LDL 90 is holding...94... LDL 76 and TG's neg, so does not need fish oil...83 in 9/20---> 95 in 3/21 is ok.  --  ESSENTIAL TREMOR of head...neg si/sx parkinsons...d/w tx if she desires.  --  GERD w/o dysphagia on PPI qd and occ tums for breakthrough.  ZOSTER/RASH per HPI=flat vesicular, grouped and follows pattern of L4, so will treat as ZOSTER despite itch; call if no help...drying up, but pain now, so neurontin...no c/o...rec Shingrix 7/18.  --  VIT D DEF remains stable=45 (7/18)...44---> 51 in 9/20.  OSTEOPENIA...BMD 8/13 = spine -1.4, hip -1.5...BMD 4/17 = spine -1.4, hip -2.2 and I rec tx now with reclast 7/17...she tolerated this...BMD 9/19 = spine -0.9, hip -1.7....Reclast x 3 as of 3/20---> will repeat this fall '21.  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 Uses aleve rarely...daily now, so rec try to work tylenol in there instead...on bid aleve and I rec try to use tylenol instead once again at 1/18 OV... on tylenol and wants to add voltaren gel 1/19 OV.  RLS and will try sinemet 1/19 OV.  DVT after 2/20 L TKR=off meds as of 9/20 with " no sxs.  --  --  MMG 12/19/22 per me.   COLON 12/21 = 10 mm poylp = 3 yrs per Dr Thomas.  Pneumovax, Prevnar done; Hep A x2; Shingirx rec 7/18.  (, 6/1/19 was 62 yrs, to retire this month 9/14; takes care of great-grandkids as of 7/19 OV; Daughter with Pulm Fibrosis moved in with them recently as of 3/21 OV; she is followed by Dr Avalos; on 8L as of 3/21; this daughter has daughter who is RN going for NP is caring for her--->she passed 8/21).    Follow Up   Return for Next scheduled follow up.  Patient was given instructions and counseling regarding her condition or for health maintenance advice. Please see specific information pulled into the AVS if appropriate.

## 2024-05-22 NOTE — ASSESSMENT & PLAN NOTE
Patient is having a nice recovery from this, she was on skilled nursing for a week as noted, and home health come to start working with her tomorrow.  She completed Lovenox therapy during her hospitalization, and is on full-strength aspirin presently.  Not having any side effects to this fortunately.    On exam she does have some swelling in the L extremity, but no calf tenderness.  Discussed with patient to try to keep leg elevated more during the day.    Pain is well-controlled presently she just using some as needed Tylenol, and as noted she is on aspirin therapy.    Has follow-up with Dr. Cruz tomorrow, appreciate his expertise.

## 2024-05-22 NOTE — OUTREACH NOTE
Prep Survey      Flowsheet Row Responses   [facility] patient discharged from? [facility]   Is LACE score < 7 ? Yes   Eligibility [facility]  - rehab unit   Date of Admission 05/13/24   Date of Discharge 05/21/24   Discharge Disposition Home-Health Care [facility]   Discharge diagnosis TOTAL HIP ARTHROPLASTY   Does the patient have one of the following disease processes/diagnoses(primary or secondary)? Total Joint Replacement   Does the patient have Home health ordered? Yes   What is the Home health agency?  [facility]   Is there a DME ordered? No   Prep survey completed? Yes            Parisa BALLARD - Registered Nurse

## 2024-05-22 NOTE — OUTREACH NOTE
Call Center TCM Note      Flowsheet Row Responses   [facility] patient discharged from? [facility]   Does the patient have one of the following disease processes/diagnoses(primary or secondary)? Total Joint Replacement   Joint surgery performed? Hip   TCM attempt successful? Yes   TCM call completed? Yes   Wrap up additional comments TCM appt completed on 5/22/24: PCP appointment within 2 business days of DC fulfills the TCM requirement, no call necessary.            Colette Alas RN    5/22/2024, 14:54 EDT

## 2024-05-22 NOTE — ASSESSMENT & PLAN NOTE
Patient had very reasonable sugars obtained during her recent hospitalization for hip fracture.  We were going to repeat a A1c next month, but I think we can wait till she comes back later this year.

## 2024-05-22 NOTE — ASSESSMENT & PLAN NOTE
Patient's hemoglobin was down from 13 to 7.1 immediate postop period she was watched closely, hemoglobin improved to 8.1 prior to discharge.  She is not hypotensive with this.  She is on aspirin for DVT prophylaxis and not having any stomach upset.  She is tolerating iron and will add vitamin C to help the absorption.  Will repeat studies at her follow-up later this year.

## 2024-05-24 ENCOUNTER — OFFICE VISIT (OUTPATIENT)
Dept: ORTHOPEDIC SURGERY | Facility: CLINIC | Age: 84
End: 2024-05-24
Payer: MEDICARE

## 2024-05-24 VITALS
DIASTOLIC BLOOD PRESSURE: 65 MMHG | BODY MASS INDEX: 30 KG/M2 | SYSTOLIC BLOOD PRESSURE: 130 MMHG | HEIGHT: 62 IN | HEART RATE: 90 BPM | WEIGHT: 163 LBS | OXYGEN SATURATION: 94 %

## 2024-05-24 DIAGNOSIS — Z96.642 STATUS POST TOTAL REPLACEMENT OF LEFT HIP: ICD-10-CM

## 2024-05-24 DIAGNOSIS — M25.552 LEFT HIP PAIN: Primary | ICD-10-CM

## 2024-05-24 NOTE — PROGRESS NOTES
"Chief Complaint  Follow-up and Pain of the Left Hip    Subjective          Laura Cardenas presents to [facility] for   History of Present Illness    Laura returns today for follow-up of her left hip.  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  She reports her pain has been very well-controlled.  She is up and ambulatory with her walker.  She reports PT is going to transition her to a cane.  She is very happy with her progress thus far.  She denies any redness or drainage from her incision.    Allergies   Allergen Reactions    Apixaban Hives        Social History     Socioeconomic History    Marital status:    Tobacco Use    Smoking status: Former     Current packs/day: 0.00     Average packs/day: 0.5 packs/day for 39.0 years (19.5 ttl pk-yrs)     Types: Cigarettes     Start date: 1957     Quit date: 1996     Years since quittin.6    Smokeless tobacco: Never   Vaping Use    Vaping status: Never Used   Substance and Sexual Activity    Alcohol use: Never    Drug use: Never    Sexual activity: Defer        I reviewed the patient's chief complaint, history of present illness, review of systems, past medical history, surgical history, family history, social history, medications, and allergy list.     REVIEW OF SYSTEMS    Constitutional: Denies fevers, chills, weight loss  Cardiovascular: Denies chest pain, shortness of breath  Skin: Denies rashes, acute skin changes  Neurologic: Denies headache, loss of consciousness  MSK: Left hip pain      Objective   Vital Signs:   /65   Pulse 90   Ht 157.5 cm (62\")   Wt 73.9 kg (163 lb)   SpO2 94%   BMI 29.81 kg/m²     Body mass index is 29.81 kg/m².    Physical Exam    General: Alert. No acute distress.   Left lower extremity: Healing direct anterior incision.  Minor irritation around the staple insertion site.  No drainage.  Swelling is mild.  Calf nontender.  Leg lengths symmetric. "  No pain with hip motion.  Distal neurovascular intact.    Procedures    Imaging Results (Most Recent)       Procedure Component Value Units Date/Time    XR Hip With or Without Pelvis 2 - 3 View Left [[id number]] Resulted: 05/24/24 0949     Updated: 05/24/24 0950    Narrative:      Indications: Follow-up left total hip arthroplasty    Views: AP and frog lateral left hip    Findings: Press-fit left total hip arthroplasty implants in place.  No   periprosthetic fractures.  No signs of loosening.  The hip is reduced.    Comparative Data: Comparative data found and reviewed today                     Assessment and Plan        XR Hip With or Without Pelvis 2 - 3 View Left    Result Date: 5/24/2024  Narrative: Indications: Follow-up left total hip arthroplasty Views: AP and frog lateral left hip Findings: Press-fit left total hip arthroplasty implants in place.  No periprosthetic fractures.  No signs of loosening.  The hip is reduced. Comparative Data: Comparative data found and reviewed today    XR Hip With or Without Pelvis 2 - 3 View Left    Result Date: 5/14/2024  Narrative: X-Ray Report: Study: X-rays ordered, taken in the office, and reviewed today. Site: Left hip Xray Indication: Pain View: AP/Lateral view(s) Findings: Collapse of the left femoral head.  Abnormal alignment of the femoral head and neck. Prior studies available for comparison: yes     XR Hip With or Without Pelvis 2 - 3 View Left    Result Date: 5/10/2024  Narrative: XR HIP W OR WO PELVIS 2-3 VIEW LEFT-  Date of Exam: 5/10/2024 8:10 PM  Indication: Post-Op Hip Arthroplasty; S72.002A-Fracture of unspecified part of neck of left femur, initial encounter for closed fracture; S72.002A-Fracture of unspecified part of neck of left femur, initial encounter for closed fracture  Comparison: None available.  Findings: A left hip bipolar hemiarthroplasty has been performed. Prosthetic components are in anatomic alignment. No fracture is seen. Expected  postoperative changes are noted in the soft tissues.      Impression: Impression: 1.  Status post left total hip arthroplasty without evidence of complication   Electronically Signed By-Ibrahima Rose MD On:5/10/2024 9:04 PM      FL Surgery Fluoro    Result Date: 5/10/2024  Narrative: This procedure was auto-finalized with no dictation required.    CT Lower Extremity Left Without Contrast    Result Date: 5/9/2024  Narrative: CT LOWER EXTREMITY LEFT WO CONTRAST-  Date of Exam: 5/9/2024 8:30 PM  Indication: hip fx; S72.002A-Fracture of unspecified part of neck of left femur, initial encounter for closed fracture; S72.002A-Fracture of unspecified part of neck of left femur, initial encounter for closed fracture.  Comparison: CT pelvis dated 4/11/2023  Technique: Axial CT images were obtained of the left lower extremity without contrast administration.  Reconstructed coronal and sagittal images were also obtained. Automated exposure control and iterative construction methods were used.   Findings: There is a comminuted, intra-articular fracture of the left femoral head. The femoral head appears smaller than typical. This may be secondary to mechanical erosion of the femoral head within the acetabulum. There is a vertically oriented fracture line extending into the femoral neck laterally. Superior to this, there is a rounded defect within the lateral femoral neck suspected to represent a mechanical erosion secondary to articulation with the acetabular rim. The femoral neck/shaft is displaced superiorly in relation to the femoral head with the lateral femoral neck abutting the lateral acetabular rim. There is a small mechanical erosion involving the anterior acetabular roof measuring 0.7 cm transverse. Multiple ossific fragments/loose bodies are noted within the hip joint measuring up to 1.1 cm. There is complete loss of joint space superiorly.  Severe degenerative disc changes are noted at L5-S1.  Visualized pelvic  viscera appear normal.      Impression: Impression: 1.  Comminuted, intra-articular fracture of the left femoral head which is suspected to be subacute to chronic in age. It is new since the pelvis radiograph dated 12/21/2023. No significant callus formation is noted. 2.  There are suspected mechanical erosions of the femoral head, femoral neck and anterior acetabular rim, as described above. 3.  Superior migration of the femoral neck with pseudoarticulation of the femoral neck and lateral acetabular rim. 4.  Complete loss of the superior joint space 5.  Severe degenerative disc disease at L5-S1. 6.  Numerous foci of abnormal mineralization in the left hip likely related to the previously described mechanical erosion. Larger loose bodies around the joint measuring up to 1.1 cm are also noted.   Electronically Signed By-Ibrahima Rose MD On:5/9/2024 10:19 PM      XR Hip With or Without Pelvis 2 - 3 View Left    Result Date: 5/9/2024  Narrative: XR HIP W OR WO PELVIS 2-3 VIEW LEFT-  Date of Exam: 5/9/2024 6:34 PM  Indication: pain  Comparison: 2 view right hip dated 2/22/2024  Findings: There has been a previous fracture of the left subcapital femoral neck with some extension into the femoral head. With relation to the acetabulum and femoral head, the femoral neck is displaced superiorly approximately 1.9 cm. The fracture line is vaguely seen.  There is complete loss of the joint space in the superior left hip joint.             There are mild degenerative changes of the right hip joint. Osseous structures otherwise appear unremarkable . Moderate to severe degenerative disc changes are noted in the lower lumbar spine.      Impression: Impression: 1.  Moderate deformity of the left femoral head and neck favored to be secondary to a subcapital femoral neck fracture with some extension into the femoral head. There is resulting malalignment of the femoral head and neck, as discussed above. The vagueness of the  fracture line suggest the possibility that the fracture is subacute. It is new since 12/21/2023.   Electronically Signed By-Ibrahima Rose MD On:5/9/2024 7:42 PM      XR Chest 1 View    Result Date: 5/9/2024  Narrative: XR CHEST 1 VW-  Date of Exam: 5/9/2024 5:43 PM  Indication: pre op  Comparison: Chest AP dated 11/27/2022  Findings: The lungs are clear bilaterally. The cardiac and mediastinal silhouettes appear normal. No effusion is seen.      Impression: Impression: 1.  No acute cardiopulmonary disease.   Electronically Signed By-Ibrahima Rose MD On:5/9/2024 6:46 PM        Diagnoses and all orders for this visit:    1. Left hip pain (Primary)  -     XR Hip With or Without Pelvis 2 - 3 View Left    2. Status post total replacement of left hip        We reviewed her x-rays.  She is doing very well with therapy.  She will continue her PT progression.  We discussed incision site care.  We discussed anterior precautions.  We discussed concerning signs and symptoms related to her incision.  She will follow-up in 4 weeks for reevaluation.  Will obtain new x-rays of the left hip when she returns.    Call or return if worsening symptoms.    Scribed for Ivan Cruz MD by Ivan Cruz MD  05/24/2024   12:43 EDT         Follow Up       4 weeks    Patient was given instructions and counseling regarding her condition or for health maintenance advice. Please see specific information pulled into the AVS if appropriate.

## 2024-06-24 ENCOUNTER — OFFICE VISIT (OUTPATIENT)
Dept: ORTHOPEDIC SURGERY | Facility: CLINIC | Age: 84
End: 2024-06-24
Payer: MEDICARE

## 2024-06-24 VITALS
OXYGEN SATURATION: 98 % | HEIGHT: 62 IN | SYSTOLIC BLOOD PRESSURE: 142 MMHG | HEART RATE: 73 BPM | WEIGHT: 163 LBS | BODY MASS INDEX: 30 KG/M2 | DIASTOLIC BLOOD PRESSURE: 74 MMHG

## 2024-06-24 DIAGNOSIS — M25.552 LEFT HIP PAIN: ICD-10-CM

## 2024-06-24 DIAGNOSIS — Z96.642 STATUS POST TOTAL REPLACEMENT OF LEFT HIP: Primary | ICD-10-CM

## 2024-06-24 PROCEDURE — 99024 POSTOP FOLLOW-UP VISIT: CPT | Performed by: PHYSICIAN ASSISTANT

## 2024-06-24 NOTE — PROGRESS NOTES
"Chief Complaint  Follow-up and Pain of the Left Hip    Subjective          Laura Cardenas presents to [facility]   History of Present Illness    Laura Cardenas presents today for a follow-up of her left hip.  Patient is 6 weeks postop left total hip arthroplasty performed on 5/10/2024.  Today, patient states that she is doing well.  She denies any pain..  She completed formal physical therapy.  She reports good hip range of motion and strength.  She feels that her hip is stable.  She states that her incision is well-healed.  Denies lower extremity numbness or tingling.      Allergies   Allergen Reactions    Apixaban Hives        Social History     Socioeconomic History    Marital status:    Tobacco Use    Smoking status: Former     Current packs/day: 0.00     Average packs/day: 0.5 packs/day for 39.0 years (19.5 ttl pk-yrs)     Types: Cigarettes     Start date: 1957     Quit date: 1996     Years since quittin.7    Smokeless tobacco: Never   Vaping Use    Vaping status: Never Used   Substance and Sexual Activity    Alcohol use: Never    Drug use: Never    Sexual activity: Defer        I reviewed the patient's chief complaint, history of present illness, review of systems, past medical history, surgical history, family history, social history, medications, and allergy list.     REVIEW OF SYSTEMS    Constitutional: Denies fevers, chills, weight loss  Cardiovascular: Denies chest pain, shortness of breath  Skin: Denies rashes, acute skin changes  Neurologic: Denies headache, loss of consciousness  MSK: Left hip pain      Objective   Vital Signs:   /74   Pulse 73   Ht 157.5 cm (62\")   Wt 73.9 kg (163 lb)   SpO2 98%   BMI 29.81 kg/m²     Body mass index is 29.81 kg/m².    Physical Exam    General: Alert. No acute distress.   Left lower extremity: Well-healed incision with no concerning signs for infection.  Hip flexion intact.  5 out of 5 hip flexion.  5 out " of 5 hip abduction.  No pain with passive logroll the hip.  No pain with passive hip range of motion.  Knee extensor mechanism intact.  Calf soft, nontender.  Demonstrates active ankle plantarflexion and dorsiflexion.  Sensation intact over the dorsal and plantar foot.  Palpable pedal pulses.    Procedures    Imaging Results (Most Recent)       None                   Assessment and Plan    Diagnoses and all orders for this visit:    1. Status post total replacement of left hip (Primary)    2. Left hip pain  -     XR Hip With or Without Pelvis 2 - 3 View Left; Future        Laura Cardenas presents today 6 weeks postop left total hip arthroplasty performed on 5/10/2024.  X-rays reviewed with the patient today.  Well-healed incision with no concerning signs for infection.  Patient instructed to continue with her home exercises working on both range of motion and strength.      Patient will follow up in 6 weeks for reevaluation.  We will obtain new x-rays of the left hip at next visit.      Call or return if symptoms worsen or patient has any concerns.       Follow Up   Return in about 6 weeks (around 8/5/2024).  Patient was given instructions and counseling regarding her condition or for health maintenance advice. Please see specific information pulled into the AVS if appropriate.     Kat Mccauley PA-C  06/26/24  07:27 EDT

## 2024-08-05 ENCOUNTER — OFFICE VISIT (OUTPATIENT)
Dept: ORTHOPEDIC SURGERY | Facility: CLINIC | Age: 84
End: 2024-08-05
Payer: MEDICARE

## 2024-08-05 VITALS
OXYGEN SATURATION: 93 % | WEIGHT: 162.92 LBS | HEART RATE: 66 BPM | HEIGHT: 62 IN | SYSTOLIC BLOOD PRESSURE: 153 MMHG | BODY MASS INDEX: 29.98 KG/M2 | DIASTOLIC BLOOD PRESSURE: 78 MMHG

## 2024-08-05 DIAGNOSIS — Z96.642 STATUS POST TOTAL REPLACEMENT OF LEFT HIP: Primary | ICD-10-CM

## 2024-08-05 DIAGNOSIS — M25.552 LEFT HIP PAIN: ICD-10-CM

## 2024-08-05 PROCEDURE — 99213 OFFICE O/P EST LOW 20 MIN: CPT | Performed by: PHYSICIAN ASSISTANT

## 2024-08-05 NOTE — PROGRESS NOTES
"Chief Complaint  Pain and Follow-up of the Left Hip    Subjective          Laura Cardenas presents to [facility]   History of Present Illness    Laura Cardenas presents today for a follow-up of her left hip.  Patient is 3 months postop left total hip arthroplasty performed on 5/10/2024.  Today, patient states that she is doing okay.  She states that her hip started hurting about 3 to 4 weeks ago.  She reports pain to the groin area.  She denies any new injuries or new falls.  She denies lower extremity numbness or tingling.  States that her incision is well-healed.  States that her range of motion and strength of her hip are good.  Patient has not been doing her home exercises recently.      Allergies   Allergen Reactions    Apixaban Hives        Social History     Socioeconomic History    Marital status:    Tobacco Use    Smoking status: Former     Current packs/day: 0.00     Average packs/day: 0.5 packs/day for 39.0 years (19.5 ttl pk-yrs)     Types: Cigarettes     Start date: 1957     Quit date: 1996     Years since quittin.8    Smokeless tobacco: Never   Vaping Use    Vaping status: Never Used   Substance and Sexual Activity    Alcohol use: Never    Drug use: Never    Sexual activity: Defer        I reviewed the patient's chief complaint, history of present illness, review of systems, past medical history, surgical history, family history, social history, medications, and allergy list.     REVIEW OF SYSTEMS    Constitutional: Denies fevers, chills, weight loss  Cardiovascular: Denies chest pain, shortness of breath  Skin: Denies rashes, acute skin changes  Neurologic: Denies headache, loss of consciousness  MSK: Left hip pain      Objective   Vital Signs:   /78   Pulse 66   Ht 157.5 cm (62\")   Wt 73.9 kg (162 lb 14.7 oz)   SpO2 93%   BMI 29.80 kg/m²     Body mass index is 29.8 kg/m².    Physical Exam    General: Alert. No acute distress.   Left " lower extremity: Well-healed scar with no concerning signs for infection.  Hip flexion intact.  5 out of 5 hip flexion.  5 out of 5 hip abduction.  No pain to the groin with passive hip range of motion.  No groin pain with passive logroll the hip.  Knee extensor mechanism intact.  Calf soft, nontender.  Demonstrates active ankle plantarflexion and dorsiflexion.  Sensation intact over the dorsal and plantar foot.  Palpable pulses.    Procedures    Imaging Results (Most Recent)       Procedure Component Value Units Date/Time    XR Hip With or Without Pelvis 2 - 3 View Left [[id number]] Resulted: 08/05/24 1027     Updated: 08/05/24 1028    Narrative:      Indications: Follow-up left total hip arthroplasty    Views: AP and frog lateral left hip    Findings: Left total hip arthroplasty implants in place.  Implant   positioning remains stable.  No hardware loosening or complications.  No   periprosthetic fractures.  Hip is reduced.    Comparative Data: Comparative data found and reviewed today.                   Assessment and Plan    Diagnoses and all orders for this visit:    1. Status post total replacement of left hip (Primary)    2. Left hip pain  -     XR Hip With or Without Pelvis 2 - 3 View Left        Laura Cardenas presents today 3 months postop left total hip arthroplasty performed on 5/10/2024.  X-rays reviewed with the patient today.  Incision is well-healed with no concerning signs for infection.  Patient is instructed to perform home exercises.  We discussed the importance of home exercises for both range of motion and strength.      Patient will follow up in 4 weeks for reevaluation.  We will obtain new x-rays of the left hip at next visit.      Call or return if symptoms worsen or patient has any concerns.       Follow Up   Return in about 4 weeks (around 9/2/2024).  Patient was given instructions and counseling regarding her condition or for health maintenance advice. Please see specific information  pulled into the AVS if appropriate.     Kat Mccauley PA-C  08/05/24  10:29 EDT

## 2024-08-19 ENCOUNTER — OFFICE VISIT (OUTPATIENT)
Dept: INTERNAL MEDICINE | Age: 84
End: 2024-08-19
Payer: MEDICARE

## 2024-08-19 VITALS
SYSTOLIC BLOOD PRESSURE: 124 MMHG | HEART RATE: 64 BPM | WEIGHT: 163.4 LBS | BODY MASS INDEX: 30.07 KG/M2 | OXYGEN SATURATION: 96 % | DIASTOLIC BLOOD PRESSURE: 86 MMHG | TEMPERATURE: 97.7 F | HEIGHT: 62 IN | RESPIRATION RATE: 18 BRPM

## 2024-08-19 DIAGNOSIS — Z00.00 MEDICARE ANNUAL WELLNESS VISIT, SUBSEQUENT: Primary | ICD-10-CM

## 2024-08-19 DIAGNOSIS — N63.10 MASS OF RIGHT BREAST, UNSPECIFIED QUADRANT: ICD-10-CM

## 2024-08-19 DIAGNOSIS — N63.11 MASS OF UPPER OUTER QUADRANT OF RIGHT BREAST: ICD-10-CM

## 2024-08-19 PROBLEM — R53.81 PHYSICAL DEBILITY: Status: RESOLVED | Noted: 2024-05-13 | Resolved: 2024-08-19

## 2024-08-19 PROBLEM — S72.002A HIP FX, LEFT, CLOSED, INITIAL ENCOUNTER: Status: RESOLVED | Noted: 2024-05-09 | Resolved: 2024-08-19

## 2024-08-19 PROBLEM — Z09 HOSPITAL DISCHARGE FOLLOW-UP: Status: RESOLVED | Noted: 2024-05-22 | Resolved: 2024-08-19

## 2024-08-19 PROCEDURE — 99213 OFFICE O/P EST LOW 20 MIN: CPT | Performed by: INTERNAL MEDICINE

## 2024-08-19 PROCEDURE — 1126F AMNT PAIN NOTED NONE PRSNT: CPT | Performed by: INTERNAL MEDICINE

## 2024-08-19 PROCEDURE — 1170F FXNL STATUS ASSESSED: CPT | Performed by: INTERNAL MEDICINE

## 2024-08-19 PROCEDURE — G0439 PPPS, SUBSEQ VISIT: HCPCS | Performed by: INTERNAL MEDICINE

## 2024-08-19 RX ORDER — ESCITALOPRAM OXALATE 10 MG/1
10 TABLET ORAL DAILY
Qty: 90 TABLET | Refills: 1 | Status: SHIPPED | OUTPATIENT
Start: 2024-08-19

## 2024-08-19 NOTE — ASSESSMENT & PLAN NOTE
This had totally resolved, but patient did start having some issues here past couple of weeks, was seen by Ortho, had repeat images of the left hip which were stable, so she is doing more home exercises and has a follow-up with them in a couple of weeks.  She is not requiring any assistive devices.

## 2024-08-19 NOTE — PROGRESS NOTES
Chief Complaint  Medicare Wellness-subsequent and Breast Pain (Lump in right breast and having pain in right armpit)    Subjective          Laura Cardenas presents to [facility]     Hyperlipidemia  Pertinent negatives include no chest pain or shortness of breath.   Breast Pain  Pertinent negatives include no abdominal pain, arthralgias, chest pain, congestion, coughing, fatigue or fever.     History of present illness:  84-year-old female with underlying hyperlipidemia, IFG, reflux, who is coming in 8/24 for routine 6-month follow-up.  We will go over her med list in detail, review her labs, and make further recommendations at that time.    ---> seen in 5/24 for hospital follow-up:  Date of Admission: 5/9/2024  Date of Discharge:  5/13/2024  Date of Admission to SNF rehab: 5/13/2024  Date of Discharge from SNF rehab: 5/21/2024    Left femoral head and neck fracture likely secondary to traumatic fall with underlying osteoporosis  Chronic left lumbar radiculopathy  History of osteoporosis  Prediabetes  Dyslipidemia  Mixed stress and urge urinary incontinence       Review of Systems   Constitutional:  Negative for appetite change, fatigue and fever.   HENT:  Negative for congestion and ear pain.    Eyes:  Negative for blurred vision.   Respiratory:  Negative for cough, chest tightness, shortness of breath and wheezing.    Cardiovascular:  Negative for chest pain, palpitations and leg swelling.   Gastrointestinal:  Negative for abdominal pain.   Genitourinary:  Positive for breast pain. Negative for difficulty urinating, dysuria and hematuria.   Musculoskeletal:  Negative for arthralgias and gait problem.   Skin:  Negative for skin lesions.   Neurological:  Negative for syncope, memory problem and confusion.   Psychiatric/Behavioral:  Negative for self-injury and depressed mood.        Objective   Vital Signs:   /86   Pulse 64   Temp 97.7 °F (36.5 °C) (Infrared)   Resp 18    "Ht 157.5 cm (62\")   Wt 74.1 kg (163 lb 6.4 oz)   SpO2 96%   BMI 29.89 kg/m²           Physical Exam  Vitals and nursing note reviewed.   Constitutional:       General: She is not in acute distress.     Appearance: Normal appearance. She is not toxic-appearing.   HENT:      Head: Atraumatic.      Right Ear: External ear normal.      Left Ear: External ear normal.      Nose: Nose normal.      Mouth/Throat:      Mouth: Mucous membranes are moist.   Eyes:      General:         Right eye: No discharge.         Left eye: No discharge.      Extraocular Movements: Extraocular movements intact.      Pupils: Pupils are equal, round, and reactive to light.   Cardiovascular:      Rate and Rhythm: Normal rate and regular rhythm.      Pulses: Normal pulses.      Heart sounds: Normal heart sounds. No murmur heard.     No gallop.   Pulmonary:      Effort: Pulmonary effort is normal. No respiratory distress.      Breath sounds: No wheezing, rhonchi or rales.   Abdominal:      General: There is no distension.      Palpations: Abdomen is soft. There is no mass.      Tenderness: There is no abdominal tenderness. There is no guarding.   Musculoskeletal:         General: No swelling or tenderness.      Cervical back: No tenderness.      Right lower leg: No edema.      Left lower leg: No edema.   Skin:     General: Skin is warm and dry.      Findings: No rash.   Neurological:      General: No focal deficit present.      Mental Status: She is alert and oriented to person, place, and time. Mental status is at baseline.      Motor: No weakness.      Gait: Gait normal.   Psychiatric:         Mood and Affect: Mood normal.         Thought Content: Thought content normal.          Result Review :   The following data was reviewed by: Ari Brady MD on 10/06/2021:                  Assessment and Plan    Diagnoses and all orders for this visit:    1. Medicare annual wellness visit, subsequent (Primary)  Overview:  AWV completed 8/24.  Patient " "remains active and independent.  No falls in the past year.  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  Her living will is already on file at the hospital.    Assessment & Plan:  AWV completed 8/24.  Patient remains very active and independent.  No falls in the past year.        2. Mass of upper outer quadrant of right breast  Assessment & Plan:  This is a concern as of her 8/24 OV.  Have placed orders for diagnostic mammograms with ultrasounds if indicated.               --  --  OLDER NOTES:  VISIT 3/21:  ANNUAL MEDICARE WELLNESS EXAM 9/20 = reviewed all forms in office with pt; no new discoveries.  OVERWEIGHT and d/w plan of care=lower diet by 500 jim and 10,000 steps/day.  --  ANXIETY D/O per HPI 4/17 = gasps for breath periodically, ever since had tubes tied b/c relative told her it would do that; lost sister, daughter with pulm fibrosis, grand-daughter with blood clot and pregnant,  had MI few months ago, so will start SSRI...better 7/17 = no c/o.  --  PALPITATIONS = x3, at rest, but not very active though, very brief, but will add low dose beta blocker on RTO if same c/o.  \"HTN\" = white-coat or whathaveyou b/c is very normal elsewhere...says it's fine at home...wnl in office again today---> fine at home.  --  IFG is stable w/o meds=5.8 (1/18)...6.4---> 6.2 holding in 3/21.  LIPIDS  and rec tx (3/15)... down 40 to 130 and rec 20 mg dose now...LDL 90 is holding...94... LDL 76 and TG's neg, so does not need fish oil...83 in 9/20---> 95 in 3/21 is ok.  --  ESSENTIAL TREMOR of head...neg si/sx parkinsons...d/w tx if she desires.  --  GERD w/o dysphagia on PPI qd and occ tums for breakthrough.  ZOSTER/RASH per HPI=flat vesicular, grouped and follows pattern of L4, so will treat as ZOSTER despite itch; call if no help...drying up, but pain now, so neurontin...no c/o...rec Shingrix 7/18.  --  VIT D DEF remains stable=45 (7/18)...44---> 51 in " 9/20.  OSTEOPENIA...BMD 8/13 = spine -1.4, hip -1.5...BMD 4/17 = spine -1.4, hip -2.2 and I rec tx now with reclast 7/17...she tolerated this...BMD 9/19 = spine -0.9, hip -1.7....Reclast x 3 as of 3/20---> will repeat this fall '21.  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 Uses aleve rarely...daily now, so rec try to work tylenol in there instead...on bid aleve and I rec try to use tylenol instead once again at 1/18 OV... on tylenol and wants to add voltaren gel 1/19 OV.  RLS and will try sinemet 1/19 OV.  DVT after 2/20 L TKR=off meds as of 9/20 with no sxs.  --  --  MMG 12/19/22 per me.   COLON 12/21 = 10 mm poylp = 3 yrs per Dr Thomas.  Pneumovax, Prevnar done; Hep A x2; Shingirx rec 7/18.  (, 6/1/19 was 62 yrs, to retire this month 9/14; takes care of great-grandkids as of 7/19 OV; Daughter with Pulm Fibrosis moved in with them recently as of 3/21 OV; she is followed by Dr Avalos; on 8L as of 3/21; this daughter has daughter who is RN going for NP is caring for her--->she passed 8/21).    Follow Up   No follow-ups on file.  Patient was given instructions and counseling regarding her condition or for health maintenance advice. Please see specific information pulled into the AVS if appropriate.

## 2024-08-19 NOTE — ASSESSMENT & PLAN NOTE
As noted, patient's hemoglobin went down from 13 to 7.1 postop, was 8.1 just prior to discharge.  She is completed iron therapy, has labs scheduled for next month as of her 8/24 appointment, and that certainly seems appropriate.  She is normotensive.

## 2024-08-19 NOTE — ASSESSMENT & PLAN NOTE
This is a concern as of her 8/24 OV.  Have placed orders for diagnostic mammograms with ultrasounds if indicated.

## 2024-08-19 NOTE — PROGRESS NOTES
Subjective   The ABCs of the Annual Wellness Visit  Medicare Wellness Visit      Laura Cardenas is a 84 y.o. patient who presents for a Medicare Wellness Visit.    The following portions of the patient's history were reviewed and   updated as appropriate: allergies, current medications, past family history, past medical history, past social history, past surgical history, and problem list.    Compared to one year ago, the patient's physical   health is the same.  Compared to one year ago, the patient's mental   health is the same.    Recent Hospitalizations:  This patient has had a [facility] admission record on file within the last 365 days.  Current Medical Providers:  Patient Care Team:  Ari Brady MD as PCP - General (Internal Medicine)  Ivan Cruz MD as Consulting Physician (Orthopedic Surgery)  Edi Lindsey MD as Consulting Physician (Orthopedic Surgery)    Outpatient Medications Prior to Visit   Medication Sig Dispense Refill    acetaminophen (TYLENOL) 500 MG tablet Take 1 tablet by mouth Every 6 (Six) Hours As Needed for Mild Pain. 30 tablet 0    BIOTIN PO Take 1 capsule by mouth Daily. OTC      Calcium Carbonate 1500 (600 Ca) MG tablet Take 1 tablet by mouth 2 (Two) Times a Day With Meals.      escitalopram (LEXAPRO) 10 MG tablet TAKE 1 TABLET BY MOUTH DAILY 90 tablet 1    esomeprazole (NexIUM) 20 MG packet Take 20 mg by mouth Every Morning Before Breakfast.      estradiol (ESTRACE) 0.1 MG/GM vaginal cream Insert 1 g into the vagina 3 (Three) Times a Week.      MAGNESIUM PO Take 1 capsule by mouth Daily. OTC      multivitamin (THERAGRAN) tablet tablet Take 1 tablet by mouth Every Night.      pravastatin (PRAVACHOL) 40 MG tablet TAKE ONE TABLET BY MOUTH ONCE NIGHTLY 90 tablet 1    ferrous sulfate 325 (65 FE) MG tablet Take 1 tablet by mouth Daily With Breakfast. 60 tablet 0    gabapentin (NEURONTIN) 300 MG capsule Take 1 capsule every a.m., take 2 capsules every p.m. (Patient not taking:  "Reported on 8/19/2024) 90 capsule 1    cefdinir (OMNICEF) 300 MG capsule Take 1 capsule by mouth 2 (Two) Times a Day. 9 capsule 0     No facility-administered medications prior to visit.     No opioid medication identified on active medication list. I have reviewed chart for other potential  high risk medication/s and harmful drug interactions in the elderly.      Aspirin is not on active medication list.  Aspirin use is not indicated based on review of current medical condition/s. Risk of harm outweighs potential benefits.  .    Patient Active Problem List   Diagnosis    Gastro-esophageal reflux disease without esophagitis    Mixed hyperlipidemia    Impaired fasting glucose    Vitamin D deficiency    Primary osteoarthritis involving multiple joints    Age-related osteoporosis without current pathological fracture    Suprapubic fullness    Medicare annual wellness visit, subsequent    Mixed stress and urge urinary incontinence    Non-seasonal allergic rhinitis due to pollen    History of DVT of lower extremity    Left lumbar radiculopathy    Hip fx, left, closed, initial encounter    Closed fracture of left hip    Physical debility    Hospital discharge follow-up    Acute postoperative anemia due to expected blood loss     Advance Care Planning Advance Directive is on file.  ACP discussion was held with the patient during this visit. Patient has an advance directive in EMR which is still valid.             Objective   Vitals:    08/19/24 1630   BP: 124/86   Pulse: 64   Resp: 18   Temp: 97.7 °F (36.5 °C)   TempSrc: Infrared   SpO2: 96%   Weight: 74.1 kg (163 lb 6.4 oz)   Height: 157.5 cm (62\")   PainSc: 0-No pain   PainLoc: Generalized       Estimated body mass index is 29.89 kg/m² as calculated from the following:    Height as of this encounter: 157.5 cm (62\").    Weight as of this encounter: 74.1 kg (163 lb 6.4 oz).            Does the patient have evidence of cognitive impairment? No                                "                                                                Health  Risk Assessment    Smoking Status:  Social History     Tobacco Use   Smoking Status Former    Current packs/day: 0.00    Average packs/day: 0.5 packs/day for 39.0 years (19.5 ttl pk-yrs)    Types: Cigarettes    Start date: 1957    Quit date: 1996    Years since quittin.9   Smokeless Tobacco Never     Alcohol Consumption:  Social History     Substance and Sexual Activity   Alcohol Use Never       Fall Risk Screen  STEADI Fall Risk Assessment was completed, and patient is at LOW risk for falls.Assessment completed on:2024    Depression Screenin/19/2024     4:25 PM   PHQ-2/PHQ-9 Depression Screening   Little Interest or Pleasure in Doing Things 0-->not at all   Feeling Down, Depressed or Hopeless 0-->not at all   PHQ-9: Brief Depression Severity Measure Score 0     Health Habits and Functional and Cognitive Screenin/19/2024     4:25 PM   Functional & Cognitive Status   Do you have difficulty preparing food and eating? No   Do you have difficulty bathing yourself, getting dressed or grooming yourself? No   Do you have difficulty using the toilet? No   Do you have difficulty moving around from place to place? No   Do you have trouble with steps or getting out of a bed or a chair? No   Current Diet Frequent Junk Food   Dental Exam Up to date   Eye Exam Up to date   Exercise (times per week) 0 times per week   Current Exercises Include No Regular Exercise   Do you need help using the phone?  No   Are you deaf or do you have serious difficulty hearing?  No   Do you need help to go to places out of walking distance? No   Do you need help shopping? No   Do you need help preparing meals?  No   Do you need help with housework?  No   Do you need help with laundry? No   Do you need help taking your medications? No   Do you need help managing money? No   Do you ever drive or ride in a car without wearing a seat belt? No    Have you felt unusual stress, anger or loneliness in the last month? No   Who do you live with? Spouse   If you need help, do you have trouble finding someone available to you? No   Have you been bothered in the last four weeks by sexual problems? No   Do you have difficulty concentrating, remembering or making decisions? No           Age-appropriate Screening Schedule:  Refer to the list below for future screening recommendations based on patient's age, sex and/or medical conditions. Orders for these recommended tests are listed in the plan section. The patient has been provided with a written plan.    Health Maintenance List  Health Maintenance   Topic Date Due    TDAP/TD VACCINES (1 - Tdap) Never done    RSV Vaccine - Adults (1 - 1-dose 60+ series) Never done    COVID-19 Vaccine (7 - 2023-24 season) 03/10/2024    ANNUAL WELLNESS VISIT  03/28/2024    INFLUENZA VACCINE  08/01/2024    COLORECTAL CANCER SCREENING  12/01/2024    BMI FOLLOWUP  02/22/2025    LIPID PANEL  03/19/2025    DXA SCAN  04/10/2026    Pneumococcal Vaccine 65+  Completed    ZOSTER VACCINE  Completed                                                                                                                                                CMS Preventative Services Quick Reference  Risk Factors Identified During Encounter  None Identified    The above risks/problems have been discussed with the patient.  Pertinent information has been shared with the patient in the After Visit Summary.  An After Visit Summary and PPPS were made available to the patient.    Follow Up:   Next Medicare Wellness visit to be scheduled in 1 year.     Assessment & Plan  Medicare annual wellness visit, subsequent  AWV completed 8/24.  Patient remains very active and independent.  No falls in the past year.               Follow Up:   No follow-ups on file.

## 2024-08-23 ENCOUNTER — TELEPHONE (OUTPATIENT)
Dept: INTERNAL MEDICINE | Age: 84
End: 2024-08-23
Payer: MEDICARE

## 2024-08-23 NOTE — TELEPHONE ENCOUNTER
I have let pt know that her diagnostic mammogram has been scheduled for 9-4-2024 @ 1:30pm, she voiced understanding.

## 2024-08-23 NOTE — TELEPHONE ENCOUNTER
Caller: Laura Cardenas    Relationship: Self    Best call back number: [phone]     What was the call regarding: PATIENT STATES SHE WAS RETURNING A CALL TO [place] BUT I WAS UNABLE TO WARM TRANSFER.

## 2024-09-04 ENCOUNTER — HOSPITAL ENCOUNTER (OUTPATIENT)
Dept: MAMMOGRAPHY | Facility: HOSPITAL | Age: 84
Discharge: HOME | End: 2024-09-04
Admitting: INTERNAL MEDICINE
Payer: MEDICARE

## 2024-09-04 ENCOUNTER — APPOINTMENT (OUTPATIENT)
Dept: ULTRASOUND IMAGING | Facility: HOSPITAL | Age: 84
End: 2024-09-04
Payer: MEDICARE

## 2024-09-04 DIAGNOSIS — N63.11 MASS OF UPPER OUTER QUADRANT OF RIGHT BREAST: ICD-10-CM

## 2024-09-04 PROCEDURE — 77066 DX MAMMO INCL CAD BI: CPT

## 2024-09-04 PROCEDURE — G0279 TOMOSYNTHESIS, MAMMO: HCPCS

## 2024-09-05 ENCOUNTER — TELEPHONE (OUTPATIENT)
Dept: INTERNAL MEDICINE | Age: 84
End: 2024-09-05
Payer: MEDICARE

## 2024-09-05 NOTE — TELEPHONE ENCOUNTER
HUB may relay message to patient.     ----- Message from Ari Brady sent at 9/4/2024  6:47 PM EDT -----  Please let patient know her screening mammogram is normal and please place an order for another next year.

## 2024-09-05 NOTE — TELEPHONE ENCOUNTER
----- Message from Ari Brady sent at 9/4/2024  6:47 PM EDT -----  Please let patient know her screening mammogram is normal and please place an order for another next year.

## 2024-09-05 NOTE — TELEPHONE ENCOUNTER
Name: Laura Cardenas    Relationship: Self    Best Callback Number: [phone]     HUB PROVIDED THE RELAY MESSAGE FROM THE OFFICE   PATIENT VOICED UNDERSTANDING AND HAS NO FURTHER QUESTIONS AT THIS TIME    ADDITIONAL INFORMATION:

## 2024-09-06 ENCOUNTER — OFFICE VISIT (OUTPATIENT)
Dept: ORTHOPEDIC SURGERY | Facility: CLINIC | Age: 84
End: 2024-09-06
Payer: MEDICARE

## 2024-09-06 VITALS — OXYGEN SATURATION: 95 % | DIASTOLIC BLOOD PRESSURE: 75 MMHG | HEART RATE: 72 BPM | SYSTOLIC BLOOD PRESSURE: 127 MMHG

## 2024-09-06 DIAGNOSIS — M25.552 LEFT HIP PAIN: ICD-10-CM

## 2024-09-06 DIAGNOSIS — Z96.642 STATUS POST TOTAL REPLACEMENT OF LEFT HIP: Primary | ICD-10-CM

## 2024-09-06 NOTE — PROGRESS NOTES
Chief Complaint  Follow-up of the Left Hip    Subjective          Laura Cardenas presents to [facility]   History of Present Illness    Laura Cardenas presents today for a follow-up of her left hip.  Patient is 4 months postop left total hip arthroplasty performed on 5/10/2024.  Today, patient states that she is doing okay.  She states that she is only slightly improved since her previous appointment.  She still experiences some discomfort to her hip.  States that she has started doing her home exercises more frequently.  She states that her incision is well-healed.      Allergies   Allergen Reactions    Apixaban Hives        Social History     Socioeconomic History    Marital status:    Tobacco Use    Smoking status: Former     Current packs/day: 0.00     Average packs/day: 0.5 packs/day for 39.0 years (19.5 ttl pk-yrs)     Types: Cigarettes     Start date: 1957     Quit date: 1996     Years since quittin.9    Smokeless tobacco: Never   Vaping Use    Vaping status: Never Used   Substance and Sexual Activity    Alcohol use: Never    Drug use: Never    Sexual activity: Defer        I reviewed the patient's chief complaint, history of present illness, review of systems, past medical history, surgical history, family history, social history, medications, and allergy list.     REVIEW OF SYSTEMS    Constitutional: Denies fevers, chills, weight loss  Cardiovascular: Denies chest pain, shortness of breath  Skin: Denies rashes, acute skin changes  Neurologic: Denies headache, loss of consciousness  MSK: Left hip pain      Objective   Vital Signs:   /75   Pulse 72   SpO2 95%     There is no height or weight on file to calculate BMI.    Physical Exam    General: Alert. No acute distress.   Left lower extremity: Well-healed scar with no concerning signs for infection.  Hip flexion intact.  5 out of 5 hip flexion.  5 out of 5 hip abduction.  No pain with passive hip  range of motion.  No pain with passive logroll the hip.  Knee extensor mechanism intact.  Calf soft, nontender.  Demonstrates active ankle plantarflexion and dorsiflexion.  Sensation intact over dorsal and plantar foot.  Palpable pedal pulses.    Procedures    Imaging Results (Most Recent)       Procedure Component Value Units Date/Time    XR Hip With or Without Pelvis 2 - 3 View Left [[id number]] Resulted: 09/06/24 0910     Updated: 09/06/24 0911    Narrative:      Indications: Follow-up left total hip arthroplasty    Views: AP and frog lateral left hip    Findings: Left total hip arthroplasty implants in place.  Implant   positioning is stable compared to previous films.  No hardware loosening   or complications.  No periprosthetic fractures.  Hip is reduced.    Comparative Data: Comparative data found and reviewed today.                   Assessment and Plan    Diagnoses and all orders for this visit:    1. Status post total replacement of left hip (Primary)    2. Left hip pain  -     XR Hip With or Without Pelvis 2 - 3 View Left        Laura Cardenas presents today 4 months postop left total hip arthroplasty performed on 5/10/2024.  X-rays reviewed with the patient today.  Well-healed scar with no concerning signs for infection.  Patient is instructed to continue with her home exercises for both range of motion and strength.      Patient will follow up in 2 months for reevaluation.  We will obtain new x-rays of the left hip at next visit.      Call or return if symptoms worsen or patient has any concerns.       Follow Up   Return in about 2 months (around 11/6/2024).  Patient was given instructions and counseling regarding her condition or for health maintenance advice. Please see specific information pulled into the AVS if appropriate.     Kat Mccauley PA-C  09/06/24  09:11 EDT

## 2024-09-19 ENCOUNTER — LAB (OUTPATIENT)
Dept: LAB | Facility: HOSPITAL | Age: 84
End: 2024-09-19
Payer: MEDICARE

## 2024-09-19 DIAGNOSIS — E78.2 MIXED HYPERLIPIDEMIA: ICD-10-CM

## 2024-09-19 DIAGNOSIS — Z00.00 WELL ADULT EXAM: ICD-10-CM

## 2024-09-19 DIAGNOSIS — R73.01 IMPAIRED FASTING GLUCOSE: ICD-10-CM

## 2024-09-19 DIAGNOSIS — D50.9 IRON DEFICIENCY ANEMIA, UNSPECIFIED IRON DEFICIENCY ANEMIA TYPE: ICD-10-CM

## 2024-09-19 DIAGNOSIS — E55.9 VITAMIN D DEFICIENCY: ICD-10-CM

## 2024-09-19 LAB
25(OH)D3 SERPL-MCNC: 66.3 NG/ML (ref 30–100)
ALBUMIN SERPL-MCNC: 4.2 G/DL (ref 3.5–5.2)
ALBUMIN/GLOB SERPL: 1.8 G/DL
ALP SERPL-CCNC: 72 U/L (ref 39–117)
ALT SERPL W P-5'-P-CCNC: 13 U/L (ref 1–33)
ANION GAP SERPL CALCULATED.3IONS-SCNC: 10.8 MMOL/L (ref 5–15)
AST SERPL-CCNC: 20 U/L (ref 1–32)
BASOPHILS # BLD AUTO: 0.03 10*3/MM3 (ref 0–0.2)
BASOPHILS NFR BLD AUTO: 0.4 % (ref 0–1.5)
BILIRUB SERPL-MCNC: 0.4 MG/DL (ref 0–1.2)
BUN SERPL-MCNC: 14 MG/DL (ref 8–23)
BUN/CREAT SERPL: 15.2 (ref 7–25)
CALCIUM SPEC-SCNC: 10.1 MG/DL (ref 8.6–10.5)
CHLORIDE SERPL-SCNC: 102 MMOL/L (ref 98–107)
CHOLEST SERPL-MCNC: 138 MG/DL (ref 0–200)
CO2 SERPL-SCNC: 27.2 MMOL/L (ref 22–29)
CREAT SERPL-MCNC: 0.92 MG/DL (ref 0.57–1)
DEPRECATED RDW RBC AUTO: 43.6 FL (ref 37–54)
EGFRCR SERPLBLD CKD-EPI 2021: 62.3 ML/MIN/1.73
EOSINOPHIL # BLD AUTO: 0.15 10*3/MM3 (ref 0–0.4)
EOSINOPHIL NFR BLD AUTO: 2.1 % (ref 0.3–6.2)
ERYTHROCYTE [DISTWIDTH] IN BLOOD BY AUTOMATED COUNT: 13.7 % (ref 12.3–15.4)
FERRITIN SERPL-MCNC: 74.1 NG/ML (ref 13–150)
FOLATE SERPL-MCNC: >20 NG/ML (ref 4.78–24.2)
GLOBULIN UR ELPH-MCNC: 2.4 GM/DL
GLUCOSE SERPL-MCNC: 101 MG/DL (ref 65–99)
HBA1C MFR BLD: 6.7 % (ref 4.8–5.6)
HCT VFR BLD AUTO: 41 % (ref 34–46.6)
HDLC SERPL-MCNC: 36 MG/DL (ref 40–60)
HGB BLD-MCNC: 13.4 G/DL (ref 12–15.9)
IMM GRANULOCYTES # BLD AUTO: 0.01 10*3/MM3 (ref 0–0.05)
IMM GRANULOCYTES NFR BLD AUTO: 0.1 % (ref 0–0.5)
IRON 24H UR-MRATE: 103 MCG/DL (ref 37–145)
IRON SATN MFR SERPL: 24 % (ref 20–50)
LDLC SERPL CALC-MCNC: 82 MG/DL (ref 0–100)
LDLC/HDLC SERPL: 2.24 {RATIO}
LYMPHOCYTES # BLD AUTO: 3.12 10*3/MM3 (ref 0.7–3.1)
LYMPHOCYTES NFR BLD AUTO: 42.7 % (ref 19.6–45.3)
MCH RBC QN AUTO: 28.8 PG (ref 26.6–33)
MCHC RBC AUTO-ENTMCNC: 32.7 G/DL (ref 31.5–35.7)
MCV RBC AUTO: 88 FL (ref 79–97)
MONOCYTES # BLD AUTO: 0.71 10*3/MM3 (ref 0.1–0.9)
MONOCYTES NFR BLD AUTO: 9.7 % (ref 5–12)
NEUTROPHILS NFR BLD AUTO: 3.29 10*3/MM3 (ref 1.7–7)
NEUTROPHILS NFR BLD AUTO: 45 % (ref 42.7–76)
NRBC BLD AUTO-RTO: 0 /100 WBC (ref 0–0.2)
PLATELET # BLD AUTO: 241 10*3/MM3 (ref 140–450)
PMV BLD AUTO: 9.7 FL (ref 6–12)
POTASSIUM SERPL-SCNC: 4.6 MMOL/L (ref 3.5–5.2)
PROT SERPL-MCNC: 6.6 G/DL (ref 6–8.5)
RBC # BLD AUTO: 4.66 10*6/MM3 (ref 3.77–5.28)
SODIUM SERPL-SCNC: 140 MMOL/L (ref 136–145)
TIBC SERPL-MCNC: 428 MCG/DL (ref 298–536)
TRANSFERRIN SERPL-MCNC: 287 MG/DL (ref 200–360)
TRIGL SERPL-MCNC: 107 MG/DL (ref 0–150)
TSH SERPL DL<=0.05 MIU/L-ACNC: 2.41 UIU/ML (ref 0.27–4.2)
VIT B12 BLD-MCNC: 411 PG/ML (ref 211–946)
VLDLC SERPL-MCNC: 20 MG/DL (ref 5–40)
WBC NRBC COR # BLD AUTO: 7.31 10*3/MM3 (ref 3.4–10.8)

## 2024-09-19 PROCEDURE — 83540 ASSAY OF IRON: CPT

## 2024-09-19 PROCEDURE — 36415 COLL VENOUS BLD VENIPUNCTURE: CPT

## 2024-09-19 PROCEDURE — 84443 ASSAY THYROID STIM HORMONE: CPT

## 2024-09-19 PROCEDURE — 85025 COMPLETE CBC W/AUTO DIFF WBC: CPT

## 2024-09-19 PROCEDURE — 80053 COMPREHEN METABOLIC PANEL: CPT

## 2024-09-19 PROCEDURE — 82607 VITAMIN B-12: CPT

## 2024-09-19 PROCEDURE — 82746 ASSAY OF FOLIC ACID SERUM: CPT

## 2024-09-19 PROCEDURE — 84466 ASSAY OF TRANSFERRIN: CPT

## 2024-09-19 PROCEDURE — 83036 HEMOGLOBIN GLYCOSYLATED A1C: CPT

## 2024-09-19 PROCEDURE — 82306 VITAMIN D 25 HYDROXY: CPT

## 2024-09-19 PROCEDURE — 82728 ASSAY OF FERRITIN: CPT

## 2024-09-19 PROCEDURE — 80061 LIPID PANEL: CPT

## 2024-09-26 ENCOUNTER — OFFICE VISIT (OUTPATIENT)
Dept: INTERNAL MEDICINE | Age: 84
End: 2024-09-26
Payer: MEDICARE

## 2024-09-26 VITALS
HEART RATE: 68 BPM | TEMPERATURE: 97.7 F | WEIGHT: 159.8 LBS | BODY MASS INDEX: 29.4 KG/M2 | SYSTOLIC BLOOD PRESSURE: 144 MMHG | DIASTOLIC BLOOD PRESSURE: 80 MMHG | OXYGEN SATURATION: 98 % | HEIGHT: 62 IN

## 2024-09-26 DIAGNOSIS — D62 ACUTE POSTOPERATIVE ANEMIA DUE TO EXPECTED BLOOD LOSS: ICD-10-CM

## 2024-09-26 DIAGNOSIS — R73.01 IMPAIRED FASTING GLUCOSE: ICD-10-CM

## 2024-09-26 DIAGNOSIS — E78.2 MIXED HYPERLIPIDEMIA: Primary | ICD-10-CM

## 2024-09-26 DIAGNOSIS — N63.11 MASS OF UPPER OUTER QUADRANT OF RIGHT BREAST: ICD-10-CM

## 2024-09-26 DIAGNOSIS — E55.9 VITAMIN D DEFICIENCY: ICD-10-CM

## 2024-09-26 PROCEDURE — 1160F RVW MEDS BY RX/DR IN RCRD: CPT | Performed by: INTERNAL MEDICINE

## 2024-09-26 PROCEDURE — 99214 OFFICE O/P EST MOD 30 MIN: CPT | Performed by: INTERNAL MEDICINE

## 2024-09-26 PROCEDURE — 1125F AMNT PAIN NOTED PAIN PRSNT: CPT | Performed by: INTERNAL MEDICINE

## 2024-09-26 PROCEDURE — G2211 COMPLEX E/M VISIT ADD ON: HCPCS | Performed by: INTERNAL MEDICINE

## 2024-09-26 PROCEDURE — 1159F MED LIST DOCD IN RCRD: CPT | Performed by: INTERNAL MEDICINE

## 2024-11-08 ENCOUNTER — OFFICE VISIT (OUTPATIENT)
Dept: ORTHOPEDIC SURGERY | Facility: CLINIC | Age: 84
End: 2024-11-08
Payer: MEDICARE

## 2024-11-08 VITALS
BODY MASS INDEX: 29.25 KG/M2 | HEART RATE: 69 BPM | HEIGHT: 62 IN | OXYGEN SATURATION: 94 % | WEIGHT: 158.95 LBS | SYSTOLIC BLOOD PRESSURE: 147 MMHG | DIASTOLIC BLOOD PRESSURE: 74 MMHG

## 2024-11-08 DIAGNOSIS — Z96.642 STATUS POST TOTAL REPLACEMENT OF LEFT HIP: Primary | ICD-10-CM

## 2024-11-08 DIAGNOSIS — M25.552 LEFT HIP PAIN: ICD-10-CM

## 2024-11-08 NOTE — PROGRESS NOTES
"Chief Complaint  Follow-up of the Left Hip    Subjective          Laura Cardenas presents to [facility]   History of Present Illness    Laura Cardenas presents today for a follow-up of her left hip.  Patient is 6 months postop left total hip arthroplasty performed on 5/10/2024.  Today, patient states that she is doing well.  She reports no pain to her hip.  States that she has good hip range of motion and feels that she is stable.  She has been doing more activities without complications.  Patient has no concerns today.      Allergies   Allergen Reactions    Apixaban Hives        Social History     Socioeconomic History    Marital status:    Tobacco Use    Smoking status: Former     Current packs/day: 0.00     Average packs/day: 0.5 packs/day for 39.0 years (19.5 ttl pk-yrs)     Types: Cigarettes     Start date: 1957     Quit date: 1996     Years since quittin.1    Smokeless tobacco: Never   Vaping Use    Vaping status: Never Used   Substance and Sexual Activity    Alcohol use: Never    Drug use: Never    Sexual activity: Defer        I reviewed the patient's chief complaint, history of present illness, review of systems, past medical history, surgical history, family history, social history, medications, and allergy list.     REVIEW OF SYSTEMS    Constitutional: Denies fevers, chills, weight loss  Cardiovascular: Denies chest pain, shortness of breath  Skin: Denies rashes, acute skin changes  Neurologic: Denies headache, loss of consciousness  MSK: Left hip pain      Objective   Vital Signs:   /74   Pulse 69   Ht 157.5 cm (62\")   Wt 72.1 kg (158 lb 15.2 oz)   SpO2 94%   BMI 29.07 kg/m²     Body mass index is 29.07 kg/m².    Physical Exam    General: Alert. No acute distress.   Left lower extremity: Hip flexion intact.  5-5 hip flexion.  5 out of 5 hip abduction.  No pain with passive hip range of motion.  No pain with passive logroll the hip.  Knee " extensor mechanism intact.  Calf soft, nontender.  Demonstrates active ankle plantarflexion and dorsiflexion.  Sensation intact over dorsal and plantar foot.  Palpable pedal pulses.    Procedures    Imaging Results (Most Recent)       Procedure Component Value Units Date/Time    XR Hip With or Without Pelvis 2 - 3 View Left [[id number]] Resulted: 11/08/24 1039     Updated: 11/08/24 1039    Narrative:      Indications: Follow-up left total hip arthroplasty    Views: AP and frog lateral left hip    Findings: Press-fit left total hip arthroplasty implants in place with   stable positioning.  No hardware loosening or complications.  No   periprosthetic fractures.  Hip is reduced.    Comparative Data: Comparative data found and reviewed today.                   Assessment and Plan    Diagnoses and all orders for this visit:    1. Status post total replacement of left hip (Primary)    2. Left hip pain  -     XR Hip With or Without Pelvis 2 - 3 View Left        Laura Cardenas presents today 6 months postop left total hip arthroplasty performed on 5/10/2024.  X-rays reviewed with the patient today.  Patient instructed to continue with home exercises for range of motion and strength.  Continue to progress with activities as tolerated.  We discussed antibiotic prophylaxis for dental procedures.      Patient will follow up in 6 months for reevaluation.  We will obtain new x-rays of the left hip at next visit.      Call or return if symptoms worsen or patient has any concerns.       Follow Up   Return in about 6 months (around 5/8/2025).  Patient was given instructions and counseling regarding her condition or for health maintenance advice. Please see specific information pulled into the AVS if appropriate.     Kat Mccauley PA-C  11/08/24  10:42 EST

## 2024-11-11 RX ORDER — PRAVASTATIN SODIUM 40 MG
TABLET ORAL
Qty: 90 TABLET | Refills: 1 | Status: SHIPPED | OUTPATIENT
Start: 2024-11-11

## 2025-03-12 RX ORDER — ESCITALOPRAM OXALATE 10 MG/1
10 TABLET ORAL DAILY
Qty: 90 TABLET | Refills: 1 | Status: SHIPPED | OUTPATIENT
Start: 2025-03-12

## 2025-03-19 ENCOUNTER — LAB (OUTPATIENT)
Dept: LAB | Facility: HOSPITAL | Age: 85
End: 2025-03-19
Payer: MEDICARE

## 2025-03-19 DIAGNOSIS — E78.2 MIXED HYPERLIPIDEMIA: ICD-10-CM

## 2025-03-19 DIAGNOSIS — R73.01 IMPAIRED FASTING GLUCOSE: ICD-10-CM

## 2025-03-19 LAB
ALBUMIN SERPL-MCNC: 3.9 G/DL (ref 3.5–5.2)
ALBUMIN/GLOB SERPL: 1.3 G/DL
ALP SERPL-CCNC: 63 U/L (ref 39–117)
ALT SERPL W P-5'-P-CCNC: 13 U/L (ref 1–33)
ANION GAP SERPL CALCULATED.3IONS-SCNC: 8.3 MMOL/L (ref 5–15)
AST SERPL-CCNC: 26 U/L (ref 1–32)
BILIRUB SERPL-MCNC: 0.6 MG/DL (ref 0–1.2)
BUN SERPL-MCNC: 15 MG/DL (ref 8–23)
BUN/CREAT SERPL: 16 (ref 7–25)
CALCIUM SPEC-SCNC: 9.9 MG/DL (ref 8.6–10.5)
CHLORIDE SERPL-SCNC: 106 MMOL/L (ref 98–107)
CHOLEST SERPL-MCNC: 129 MG/DL (ref 0–200)
CO2 SERPL-SCNC: 26.7 MMOL/L (ref 22–29)
CREAT SERPL-MCNC: 0.94 MG/DL (ref 0.57–1)
EGFRCR SERPLBLD CKD-EPI 2021: 60.7 ML/MIN/1.73
GLOBULIN UR ELPH-MCNC: 2.9 GM/DL
GLUCOSE SERPL-MCNC: 96 MG/DL (ref 65–99)
HBA1C MFR BLD: 6.4 % (ref 4.8–5.6)
HDLC SERPL-MCNC: 33 MG/DL (ref 40–60)
LDLC SERPL CALC-MCNC: 72 MG/DL (ref 0–100)
LDLC/HDLC SERPL: 2.11 {RATIO}
MAGNESIUM SERPL-MCNC: 2.1 MG/DL (ref 1.6–2.4)
POTASSIUM SERPL-SCNC: 4.7 MMOL/L (ref 3.5–5.2)
PROT SERPL-MCNC: 6.8 G/DL (ref 6–8.5)
SODIUM SERPL-SCNC: 141 MMOL/L (ref 136–145)
TRIGL SERPL-MCNC: 132 MG/DL (ref 0–150)
VLDLC SERPL-MCNC: 24 MG/DL (ref 5–40)

## 2025-03-19 PROCEDURE — 36415 COLL VENOUS BLD VENIPUNCTURE: CPT

## 2025-03-19 PROCEDURE — 83735 ASSAY OF MAGNESIUM: CPT

## 2025-03-19 PROCEDURE — 80061 LIPID PANEL: CPT

## 2025-03-19 PROCEDURE — 80053 COMPREHEN METABOLIC PANEL: CPT

## 2025-03-19 PROCEDURE — 83036 HEMOGLOBIN GLYCOSYLATED A1C: CPT

## 2025-03-26 ENCOUNTER — OFFICE VISIT (OUTPATIENT)
Age: 85
End: 2025-03-26
Payer: MEDICARE

## 2025-03-26 VITALS
BODY MASS INDEX: 30.59 KG/M2 | DIASTOLIC BLOOD PRESSURE: 78 MMHG | HEART RATE: 69 BPM | SYSTOLIC BLOOD PRESSURE: 138 MMHG | HEIGHT: 62 IN | OXYGEN SATURATION: 96 % | WEIGHT: 166.2 LBS

## 2025-03-26 DIAGNOSIS — Z00.00 WELL ADULT EXAM: ICD-10-CM

## 2025-03-26 DIAGNOSIS — K21.9 GASTRO-ESOPHAGEAL REFLUX DISEASE WITHOUT ESOPHAGITIS: ICD-10-CM

## 2025-03-26 DIAGNOSIS — Z12.11 COLON CANCER SCREENING: ICD-10-CM

## 2025-03-26 DIAGNOSIS — E55.9 VITAMIN D DEFICIENCY: ICD-10-CM

## 2025-03-26 DIAGNOSIS — R73.01 IMPAIRED FASTING GLUCOSE: ICD-10-CM

## 2025-03-26 DIAGNOSIS — E78.2 MIXED HYPERLIPIDEMIA: Primary | ICD-10-CM

## 2025-03-26 PROBLEM — S72.002A CLOSED FRACTURE OF LEFT HIP: Status: RESOLVED | Noted: 2024-05-09 | Resolved: 2025-03-26

## 2025-03-26 PROBLEM — N63.11 MASS OF UPPER OUTER QUADRANT OF RIGHT BREAST: Status: RESOLVED | Noted: 2024-08-19 | Resolved: 2025-03-26

## 2025-03-26 PROBLEM — D62 ACUTE POSTOPERATIVE ANEMIA DUE TO EXPECTED BLOOD LOSS: Status: RESOLVED | Noted: 2024-05-22 | Resolved: 2025-03-26

## 2025-03-26 NOTE — ASSESSMENT & PLAN NOTE
LDL is down to 72 as of her 3/25 OV, she is being treated for primary prevention, she can continue with moderate dose pravastatin.

## 2025-03-26 NOTE — ASSESSMENT & PLAN NOTE
Patient's A1c is down to 6.4 without treatment as of her 3/25 OV.  I think we can still just keep an eye on this every 6 months obviously.

## 2025-03-26 NOTE — PROGRESS NOTES
"Chief Complaint  Hyperlipidemia and Follow-up (Pt had labs, she states that this routine./She is still having bladder issues, she is going to get in touch with a medical supply to get some incontinence, she is going to talk to gyn.  )    Subjective          Laura Cardenas presents to [facility]     History of present illness:  84-year-old female with underlying hyperlipidemia, IFG, reflux, who is coming in 3/25 for routine 6-month follow-up.  We will go over her med list in detail, review her labs, and make further recommendations at that time.    ---> seen in 5/24 for hospital follow-up:  Date of Admission: 5/9/2024  Date of Discharge:  5/13/2024  Date of Admission to SNF rehab: 5/13/2024  Date of Discharge from SNF rehab: 5/21/2024    Left femoral head and neck fracture likely secondary to traumatic fall with underlying osteoporosis  Chronic left lumbar radiculopathy  History of osteoporosis  Prediabetes  Dyslipidemia  Mixed stress and urge urinary incontinence       Review of Systems   Constitutional:  Negative for appetite change, fatigue and fever.   HENT:  Negative for congestion and ear pain.    Eyes:  Negative for blurred vision.   Respiratory:  Negative for cough, chest tightness, shortness of breath and wheezing.    Cardiovascular:  Negative for chest pain, palpitations and leg swelling.   Gastrointestinal:  Negative for abdominal pain.   Genitourinary:  Positive for breast pain. Negative for difficulty urinating, dysuria and hematuria.   Musculoskeletal:  Negative for arthralgias and gait problem.   Skin:  Negative for skin lesions.   Neurological:  Negative for syncope, memory problem and confusion.   Psychiatric/Behavioral:  Negative for self-injury and depressed mood.        Objective   Vital Signs:   /78   Pulse 69   Ht 157.5 cm (62.01\")   Wt 75.4 kg (166 lb 3.2 oz)   SpO2 96%   BMI 30.39 kg/m²           Physical Exam  Vitals and nursing note reviewed. "   Constitutional:       General: She is not in acute distress.     Appearance: Normal appearance. She is not toxic-appearing.   HENT:      Head: Atraumatic.      Right Ear: External ear normal.      Left Ear: External ear normal.      Nose: Nose normal.      Mouth/Throat:      Mouth: Mucous membranes are moist.   Eyes:      General:         Right eye: No discharge.         Left eye: No discharge.      Extraocular Movements: Extraocular movements intact.      Pupils: Pupils are equal, round, and reactive to light.   Cardiovascular:      Rate and Rhythm: Normal rate and regular rhythm.      Pulses: Normal pulses.      Heart sounds: Normal heart sounds. No murmur heard.     No gallop.   Pulmonary:      Effort: Pulmonary effort is normal. No respiratory distress.      Breath sounds: No wheezing, rhonchi or rales.   Abdominal:      General: There is no distension.      Palpations: Abdomen is soft. There is no mass.      Tenderness: There is no abdominal tenderness. There is no guarding.   Musculoskeletal:         General: No swelling or tenderness.      Cervical back: No tenderness.      Right lower leg: No edema.      Left lower leg: No edema.   Skin:     General: Skin is warm and dry.      Findings: No rash.   Neurological:      General: No focal deficit present.      Mental Status: She is alert and oriented to person, place, and time. Mental status is at baseline.      Motor: No weakness.      Gait: Gait normal.   Psychiatric:         Mood and Affect: Mood normal.         Thought Content: Thought content normal.          Result Review :   The following data was reviewed by: Ari Brady MD on 10/06/2021:                  Assessment and Plan    Diagnoses and all orders for this visit:    1. Mixed hyperlipidemia (Primary)  Assessment & Plan:   LDL is down to 72 as of her 3/25 OV, she is being treated for primary prevention, she can continue with moderate dose pravastatin.    Orders:  -     CBC w AUTO Differential;  "Future  -     Lipid panel; Future    2. Vitamin D deficiency  -     Vitamin D 25 hydroxy; Future    3. Impaired fasting glucose  Assessment & Plan:  Patient's A1c is down to 6.4 without treatment as of her 3/25 OV.  I think we can still just keep an eye on this every 6 months obviously.    Orders:  -     Hemoglobin A1c; Future  -     Comprehensive metabolic panel; Future    4. Gastro-esophageal reflux disease without esophagitis  Assessment & Plan:  No dysphagia no significant dyspepsia on chronic PPI as of 3/25.  Patient stable to continue low dose of Nexium.      5. Well adult exam  -     Magnesium; Future    6. Colon cancer screening  -     Cologuard - Stool, Per Rectum; Future               --  --  OLDER NOTES:  VISIT 3/21:  ANNUAL MEDICARE WELLNESS EXAM 9/20 = reviewed all forms in office with pt; no new discoveries.  OVERWEIGHT and d/w plan of care=lower diet by 500 jim and 10,000 steps/day.  --  ANXIETY D/O per HPI 4/17 = gasps for breath periodically, ever since had tubes tied b/c relative told her it would do that; lost sister, daughter with pulm fibrosis, grand-daughter with blood clot and pregnant,  had MI few months ago, so will start SSRI...better 7/17 = no c/o.  --  PALPITATIONS = x3, at rest, but not very active though, very brief, but will add low dose beta blocker on RTO if same c/o.  \"HTN\" = white-coat or whathaveyou b/c is very normal elsewhere...says it's fine at home...wnl in office again today---> fine at home.  --  IFG is stable w/o meds=5.8 (1/18)...6.4---> 6.2 holding in 3/21.  LIPIDS  and rec tx (3/15)... down 40 to 130 and rec 20 mg dose now...LDL 90 is holding...94... LDL 76 and TG's neg, so does not need fish oil...83 in 9/20---> 95 in 3/21 is ok.  --  ESSENTIAL TREMOR of head...neg si/sx parkinsons...d/w tx if she desires.  --  GERD w/o dysphagia on PPI qd and occ tums for breakthrough.  ZOSTER/RASH per HPI=flat vesicular, grouped and follows pattern of L4, so will treat " as ZOSTER despite itch; call if no help...drying up, but pain now, so neurontin...no c/o...rec Shingrix 7/18.  --  VIT D DEF remains stable=45 (7/18)...44---> 51 in 9/20.  OSTEOPENIA...BMD 8/13 = spine -1.4, hip -1.5...BMD 4/17 = spine -1.4, hip -2.2 and I rec tx now with reclast 7/17...she tolerated this...BMD 9/19 = spine -0.9, hip -1.7....Reclast x 3 as of 3/20---> will repeat this fall '21.  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 Uses aleve rarely...daily now, so rec try to work tylenol in there instead...on bid aleve and I rec try to use tylenol instead once again at 1/18 OV... on tylenol and wants to add voltaren gel 1/19 OV.  RLS and will try sinemet 1/19 OV.  DVT after 2/20 L TKR=off meds as of 9/20 with no sxs.  --  --  MMG 9/4/2024 per me = There is a stable benign 8 mm calcified oil cyst in the upper outer right breast corresponding to the patient's palpable abnormality.  COLON 12/21 = 10 mm poylp = 3 yrs per Dr Thomas---> he discussed with her that given age/increased risk of procedure probably could defer this, this was patient's first and only polyp, so unless symptoms I think were fine holding off on this as of 9/24.  Pneumovax, Prevnar done; Hep A x2; Shingirx rec 7/18.    (, 6/1/19 was 62 yrs, to retire this month 9/14; takes care of great-grandkids as of 7/19 OV; Daughter with Pulm Fibrosis moved in with them recently as of 3/21 OV; she is followed by Dr Avalos; on 8L as of 3/21; this daughter has daughter who is RN going for NP is caring for her--->she passed 8/21).    Follow Up   Return in about 6 months (around 10/6/2025).  Patient was given instructions and counseling regarding her condition or for health maintenance advice. Please see specific information pulled into the AVS if appropriate.

## 2025-03-26 NOTE — ASSESSMENT & PLAN NOTE
No dysphagia no significant dyspepsia on chronic PPI as of 3/25.  Patient stable to continue low dose of Nexium.

## 2025-03-31 ENCOUNTER — TELEPHONE (OUTPATIENT)
Age: 85
End: 2025-03-31
Payer: MEDICARE

## 2025-03-31 NOTE — TELEPHONE ENCOUNTER
Hub staff attempted to follow warm transfer process and was unsuccessful     Caller: Laura Cardenas    Relationship to patient: Self    Best call back number: [phone]      Patient is needing: CALLER STATED: RETURNING CALL AND REQUESTING A CALL BACK.  PATIENT REQUESTING A CALL BACK FROM [place].

## 2025-03-31 NOTE — TELEPHONE ENCOUNTER
Hub staff attempted to follow warm transfer process and was unsuccessful     Caller: Laura Cardenas    Relationship to patient: Self    Best call back number: [phone]     Patient is needing: PATIENT REQUESTING A CALL BACK FROM [place].

## 2025-04-22 ENCOUNTER — TELEPHONE (OUTPATIENT)
Age: 85
End: 2025-04-22
Payer: MEDICARE

## 2025-04-22 NOTE — TELEPHONE ENCOUNTER
Caller: Laura Cardenas    Relationship: Self    Best call back number: [phone]     What is the best time to reach you: ANYTIME    Who are you requesting to speak with (clinical staff, provider,  specific staff member): MARI    Do you know the name of the person who called: PATIENT    What was the call regarding: IN REGARDS TO A NAME GIVEN AS WELL AS RESULTS FROM A TEST DONE.     Is it okay if the provider responds through MyChart: CALL

## 2025-04-29 NOTE — PLAN OF CARE
Goal Outcome Evaluation:            Pt arrived to SNF, education given. Call light and personal items within reach. Able to make needs known. Reported given to on coming RN.                                   [phone]

## 2025-05-08 ENCOUNTER — OFFICE VISIT (OUTPATIENT)
Dept: ORTHOPEDIC SURGERY | Facility: CLINIC | Age: 85
End: 2025-05-08
Payer: MEDICARE

## 2025-05-08 VITALS — OXYGEN SATURATION: 98 % | HEART RATE: 68 BPM | SYSTOLIC BLOOD PRESSURE: 134 MMHG | DIASTOLIC BLOOD PRESSURE: 66 MMHG

## 2025-05-08 DIAGNOSIS — M25.552 LEFT HIP PAIN: ICD-10-CM

## 2025-05-08 DIAGNOSIS — Z96.642 STATUS POST TOTAL REPLACEMENT OF LEFT HIP: Primary | ICD-10-CM

## 2025-05-08 NOTE — PROGRESS NOTES
Chief Complaint  Follow-up of the Left Hip    Subjective          Laura Cardenas presents to [facility]   History of Present Illness    Laura Cardenas presents today for a follow-up of her left hip.  Patient is 1 year postop left total hip arthroplasty performed on 5/10/2024.  Today, patient states that she is doing fine.  She states that she has pain at times only during the night when laying on her left hip.  She states that otherwise she has good hip range of motion and strength.  States that she has been doing more activities without complications.  She feels that her hip is stable.        Allergies   Allergen Reactions    Apixaban Hives        Social History     Socioeconomic History    Marital status:    Tobacco Use    Smoking status: Former     Current packs/day: 0.00     Average packs/day: 0.5 packs/day for 39.0 years (19.5 ttl pk-yrs)     Types: Cigarettes     Start date: 1957     Quit date: 1996     Years since quittin.6    Smokeless tobacco: Never   Vaping Use    Vaping status: Never Used   Substance and Sexual Activity    Alcohol use: Never    Drug use: Never    Sexual activity: Defer        I reviewed the patient's chief complaint, history of present illness, review of systems, past medical history, surgical history, family history, social history, medications, and allergy list.     REVIEW OF SYSTEMS    Constitutional: Denies fevers, chills, weight loss  Cardiovascular: Denies chest pain, shortness of breath  Skin: Denies rashes, acute skin changes  Neurologic: Denies headache, loss of consciousness  MSK: Left hip pain      Objective   Vital Signs:   /66   Pulse 68   SpO2 98%     There is no height or weight on file to calculate BMI.    Physical Exam    General: Alert. No acute distress.   Left lower extremity: Hip flexion intact.  5-5 hip flexion.  5 out of 5 hip abduction.  No pain with passive hip range of motion.  No pain with passive  logroll the hip.  Knee extensor mechanism intact.  Calf soft, nontender.  Demonstrates active ankle plantarflexion and dorsiflexion.  Sensation intact over dorsal and plantar foot.  Palpable pedal pulses.       Procedures    Imaging Results (Most Recent)       Procedure Component Value Units Date/Time    XR Hip With or Without Pelvis 2 - 3 View Left [[id number]] Resulted: 05/08/25 1121     Updated: 05/08/25 1121    Narrative:      Indications: Follow-up left total hip arthroplasty    Views: AP and frog lateral left hip    Findings: Press-fit left total hip arthroplasty implants in place.    Implant positioning remains stable.  No evidence of loosening   complications.  No periprosthetic fractures.  Hip is reduced.    Comparative Data: Comparative data found and reviewed today.                   Assessment and Plan    Diagnoses and all orders for this visit:    1. Status post total replacement of left hip (Primary)    2. Left hip pain  -     XR Hip With or Without Pelvis 2 - 3 View Left        Laura Cardenas presents today 1 year postop left total hip arthroplasty performed on 5/10/2024.  X-rays reviewed with the patient today.  She is instructed to continue with home exercises for range of motion and strength.  Continue with progression of activity as tolerated.  We discussed antibiotic prophylaxis for dental procedures.        Patient will follow up in 1 year for 2-year postop reevaluation.  We will obtain new x-rays of the left hip at next visit.      Call or return if symptoms worsen or patient has any concerns.       Follow Up   Return in about 1 year (around 5/8/2026).  Patient was given instructions and counseling regarding her condition or for health maintenance advice. Please see specific information pulled into the AVS if appropriate.       Kat Mccauley PA-C  05/08/25  12:14 EDT

## 2025-05-28 RX ORDER — PRAVASTATIN SODIUM 40 MG
40 TABLET ORAL NIGHTLY
Qty: 90 TABLET | Refills: 3 | Status: SHIPPED | OUTPATIENT
Start: 2025-05-28

## 2025-06-24 ENCOUNTER — TELEPHONE (OUTPATIENT)
Age: 85
End: 2025-06-24
Payer: MEDICARE

## 2025-06-24 NOTE — TELEPHONE ENCOUNTER
Caller: AMADOU EM    Relationship to patient: Emergency Contact    Best call back number:     [phone] (Mobile)       Patient is needing: Patient's daughter in law called in and said patient is needing an updated statement in order to receive a handicapped parking sticker. Patient's daughter in law said it is okay to leave message on phone.when statement is ready to .

## 2025-08-11 ENCOUNTER — TELEPHONE (OUTPATIENT)
Age: 85
End: 2025-08-11
Payer: MEDICARE

## 2025-08-11 DIAGNOSIS — Z12.31 SCREENING MAMMOGRAM FOR BREAST CANCER: Primary | ICD-10-CM

## (undated) DEVICE — GLV SURG SENSICARE PI ORTHO SZ8 LF STRL

## (undated) DEVICE — SOL IRRG H2O PL/BG 1000ML STRL

## (undated) DEVICE — STERILE POLYISOPRENE POWDER-FREE SURGICAL GLOVES WITH EMOLLIENT COATING: Brand: PROTEXIS

## (undated) DEVICE — ZIPPERED TOGA, PEEL-AWAY 3X LARGE: Brand: FLYTE, SURGICOOL

## (undated) DEVICE — SLV SCD KN/LEN ADJ EXPRSS BLENDED MD 1P/U

## (undated) DEVICE — STRYKER PERFORMANCE SERIES SAGITTAL BLADE: Brand: STRYKER PERFORMANCE SERIES

## (undated) DEVICE — TOTAL ANTERIOR HIP-LF: Brand: MEDLINE INDUSTRIES, INC.

## (undated) DEVICE — Device: Brand: DEFENDO AIR/WATER/SUCTION AND BIOPSY VALVE

## (undated) DEVICE — PENCL E/S HNDSWCH ROCKR CB

## (undated) DEVICE — BIPOLAR SEALER 23-112-1 AQM 6.0: Brand: AQUAMANTYS™

## (undated) DEVICE — SUT NONABS MAXBRAID NMBR5 K60 38IN WHT/BLU
Type: IMPLANTABLE DEVICE | Site: HIP | Status: NON-FUNCTIONAL
Removed: 2024-05-10

## (undated) DEVICE — MAT FLR ABS W/BLU/LINER 56X72IN WHT

## (undated) DEVICE — ELECTRD BLD EZ CLN STD 6.5IN

## (undated) DEVICE — Device

## (undated) DEVICE — DRP SURG U/DRP INVISISHIELD PA 48X52IN

## (undated) DEVICE — CVR LEG BOOTLEG F/R NOSKID 33IN

## (undated) DEVICE — GAUZE,SPONGE,4"X4",16PLY,STRL,LF,10/TRAY: Brand: MEDLINE

## (undated) DEVICE — TOWEL,OR,DSP,ST,BLUE,STD,4/PK,20PK/CS: Brand: MEDLINE

## (undated) DEVICE — GLOVE,SURG,SENSICARE SLT,LF,PF,6: Brand: MEDLINE

## (undated) DEVICE — GLOVE,SURG,SENSICARE SLT,LF,PF,8: Brand: MEDLINE

## (undated) DEVICE — COLON KIT: Brand: MEDLINE INDUSTRIES, INC.

## (undated) DEVICE — APPL CHLORAPREP HI/LITE 26ML ORNG

## (undated) DEVICE — GLOVE,SURG,SENSICARE SLT,LF,PF,5.5: Brand: MEDLINE

## (undated) DEVICE — SUT VIC UD BR COAT 0 CP2 27IN

## (undated) DEVICE — PULLOVER TOGA, 2X LARGE: Brand: FLYTE, SURGICOOL

## (undated) DEVICE — UNDYED BRAIDED (POLYGLACTIN 910), SYNTHETIC ABSORBABLE SUTURE: Brand: COATED VICRYL

## (undated) DEVICE — KT PT POSITION SUPINE HANA/PROFX TABL

## (undated) DEVICE — PCH INST SURG INVISISHIELD 2PCKT

## (undated) DEVICE — SNAR POLYP CAPTIFLEX XS/OVL 11X2.4MM 240CM 1P/U